# Patient Record
Sex: FEMALE | Race: WHITE | NOT HISPANIC OR LATINO | Employment: OTHER | URBAN - METROPOLITAN AREA
[De-identification: names, ages, dates, MRNs, and addresses within clinical notes are randomized per-mention and may not be internally consistent; named-entity substitution may affect disease eponyms.]

---

## 2017-01-04 ENCOUNTER — OFFICE VISIT (OUTPATIENT)
Dept: FAMILY MEDICINE | Facility: CLINIC | Age: 82
End: 2017-01-04
Payer: MEDICARE

## 2017-01-04 VITALS
BODY MASS INDEX: 30.83 KG/M2 | HEIGHT: 62 IN | WEIGHT: 167.56 LBS | SYSTOLIC BLOOD PRESSURE: 147 MMHG | DIASTOLIC BLOOD PRESSURE: 67 MMHG | HEART RATE: 59 BPM | TEMPERATURE: 98 F

## 2017-01-04 DIAGNOSIS — F41.9 ANXIETY: ICD-10-CM

## 2017-01-04 DIAGNOSIS — I10 BENIGN ESSENTIAL HTN: ICD-10-CM

## 2017-01-04 DIAGNOSIS — M06.9 RHEUMATOID ARTHRITIS, INVOLVING UNSPECIFIED SITE, UNSPECIFIED RHEUMATOID FACTOR PRESENCE: Primary | ICD-10-CM

## 2017-01-04 DIAGNOSIS — M35.3 PMR (POLYMYALGIA RHEUMATICA): ICD-10-CM

## 2017-01-04 PROCEDURE — 99214 OFFICE O/P EST MOD 30 MIN: CPT | Mod: S$PBB,,, | Performed by: FAMILY MEDICINE

## 2017-01-04 PROCEDURE — 99999 PR PBB SHADOW E&M-EST. PATIENT-LVL III: CPT | Mod: PBBFAC,,, | Performed by: FAMILY MEDICINE

## 2017-01-04 PROCEDURE — 99213 OFFICE O/P EST LOW 20 MIN: CPT | Mod: PBBFAC,PO | Performed by: FAMILY MEDICINE

## 2017-01-04 RX ORDER — PREDNISONE 10 MG/1
30 TABLET ORAL DAILY
Refills: 1 | COMMUNITY
Start: 2016-11-09 | End: 2017-03-16

## 2017-01-04 NOTE — PROGRESS NOTES
The patient presents today to follow up flare PMR last esr was upper 20 and she is currently on 10mg pred   Hx nocturnal leg pain currently tx gabapentin. HBP has been stable.  Has chronic anxiety uses ativan late afternoon and hs. Hx esophageal strictures 2nd to GERD. Currently on omeprazole.     Past Medical History:  Past Medical History   Diagnosis Date    Hypertension     Polymyalgia rheumatica     Rheumatoid arthritis      Past Surgical History   Procedure Laterality Date    Hysterectomy      Cholecystectomy      Carpal tunnel release Bilateral     Eye surgery       Review of patient's allergies indicates:   Allergen Reactions    Xanax [alprazolam] Swelling    Shellfish containing products Swelling    Sulfa (sulfonamide antibiotics) Nausea And Vomiting     Current Outpatient Prescriptions on File Prior to Visit   Medication Sig Dispense Refill    amlodipine (NORVASC) 5 MG tablet Take 5 mg by mouth once daily.      CALCIUM CARBONATE/VITAMIN D3 (CALCIUM 600 + D,3, ORAL) Take by mouth.      cloNIDine (CATAPRES) 0.2 MG tablet Take 0.2 mg by mouth once daily.  3    folic acid (FOLVITE) 1 MG tablet Take 1,000 mcg by mouth once daily.  6    FOLIC ACID/MULTIVIT-MIN/LUTEIN (CENTRUM SILVER ORAL) Take by mouth.      gabapentin (NEURONTIN) 300 MG capsule One in the am and 2 in the pm 270 capsule 4    irbesartan-hydrochlorothiazide (AVALIDE) 300-12.5 mg per tablet Take 1 tablet by mouth once daily. 90 tablet 3    loperamide (IMODIUM A-D) 2 mg Tab Take 2 mg by mouth 4 (four) times daily as needed.      loratadine (CLARITIN) 10 mg tablet Take 10 mg by mouth once daily.  3    lorazepam (ATIVAN) 1 MG tablet TAKE 1 TABLET BY MOUTH 2 (TWO) TIMES DAILY. 60 tablet 5    methotrexate 2.5 MG Tab Take 8 tablets (20 mg total) by mouth every 7 days. 40 tablet 2    metoprolol succinate (TOPROL-XL) 50 MG 24 hr tablet Take 50 mg by mouth once daily.      omeprazole (PRILOSEC) 20 MG capsule Take 20 mg by mouth 2 (two)  times daily.  3    tramadol (ULTRAM) 50 mg tablet Take 2 tablets (100 mg total) by mouth 4 (four) times daily. 120 tablet 5    [DISCONTINUED] predniSONE (DELTASONE) 1 MG tablet TAKE 3 TABLETS BY MOUTH ONCE DAILY 90 tablet 1    [DISCONTINUED] aspirin (ECOTRIN) 81 MG EC tablet Take 81 mg by mouth once daily.      [DISCONTINUED] hydrocodone-acetaminophen 5-325mg (NORCO) 5-325 mg per tablet Take 1 tablet by mouth 3 (three) times daily as needed for Pain. 30 tablet 0    [DISCONTINUED] predniSONE (DELTASONE) 20 MG tablet Take 40 mg by mouth 2 (two) times daily. Take 40 mg for 5 days and then go back to 3 mg prednisone.       No current facility-administered medications on file prior to visit.      Social History     Social History    Marital status:      Spouse name: N/A    Number of children: N/A    Years of education: N/A     Occupational History    Not on file.     Social History Main Topics    Smoking status: Never Smoker    Smokeless tobacco: Not on file    Alcohol use Not on file    Drug use: Not on file    Sexual activity: Not on file     Other Topics Concern    Not on file     Social History Narrative     History reviewed. No pertinent family history.      ROS:GENERAL: No fever, chills, fatigability or weight loss.  SKIN: No rashes, itching or changes in color or texture of skin.  HEAD: No headaches or recent head trauma.EYES: Visual acuity fine. No photophobia, ocular pain or diplopia.EARS: Denies ear pain, discharge or vertigo.NOSE: No loss of smell, no epistaxis or postnasal drip.MOUTH & THROAT: No hoarseness or change in voice. No excessive gum bleeding.NODES: Denies swollen glands.  CHEST: Denies DURAN, cyanosis, wheezing, cough and sputum production.  CARDIOVASCULAR: Denies chest pain, PND, orthopnea or reduced exercise tolerance.  ABDOMEN: Appetite fine. No weight loss. Denies diarrhea, abdominal pain, hematemesis or blood in stool.  URINARY: No flank pain, dysuria or  hematuria.  PERIPHERAL VASCULAR: No claudication or cyanosis.  MUSCULOSKELETAL: See above.  NEUROLOGIC: No history of seizures, paralysis, alteration of gait or coordination.  PE:   HEAD: Normocephalic, atraumatic.EYES: PERRL. EOMI.   EARS: TM's intact. Light reflex normal. No retraction or perforation.   NOSE: Mucosa pink. Airway clear.MOUTH & THROAT: No tonsillar enlargement. No pharyngeal erythema or exudate. No stridor.  NODES: No cervical, axillary or inguinal lymph node enlargement.  CHEST: Lungs clear to auscultation.  CARDIOVASCULAR: Normal S1, S2. No rubs, murmurs or gallops.  ABDOMEN: Bowel sounds normal. Not distended. Soft. No tenderness or masses.  MUSCULOSKELETAL: Hand deformities and gen arthralgia. Rt knee tender swollen    NEUROLOGIC: Cranial Nerves: II-XII grossly intact.  Motor: 5/5 strength major flexors/extensors.  DTR's: Knees, Ankles 2+ and equal bilaterally; downgoing toes.  Sensory: Intact to light touch distally.  Gait & Posture: Normal gait and fine motion. No cerebellar signs.     Impression:RA  PMR  Trigger point  HBP  Anxiety    Plan:Lab eval rev   Incr pred to 15   [facility] ESR 1m   Rec diet and ex recs  Rev sig side effects current meds but has been well controlled on current regimen for years and will maintain for now

## 2017-01-10 RX ORDER — TRAMADOL HYDROCHLORIDE 50 MG/1
TABLET ORAL
Qty: 120 TABLET | Refills: 3 | Status: SHIPPED | OUTPATIENT
Start: 2017-01-10 | End: 2017-03-13 | Stop reason: SDUPTHER

## 2017-01-13 RX ORDER — PREDNISONE 5 MG/1
5 TABLET ORAL DAILY
Qty: 90 TABLET | Refills: 0 | Status: SHIPPED | OUTPATIENT
Start: 2017-01-13 | End: 2017-05-25 | Stop reason: SDUPTHER

## 2017-01-13 RX ORDER — PREDNISONE 5 MG/1
5 TABLET ORAL DAILY
COMMUNITY
End: 2017-01-13 | Stop reason: SDUPTHER

## 2017-01-20 DIAGNOSIS — M06.9 RHEUMATOID ARTHRITIS INVOLVING MULTIPLE JOINTS: Primary | ICD-10-CM

## 2017-01-20 RX ORDER — METHOTREXATE 2.5 MG/1
20 TABLET ORAL
Qty: 40 TABLET | Refills: 2 | Status: CANCELLED | OUTPATIENT
Start: 2017-01-20

## 2017-01-31 RX ORDER — METHOTREXATE 2.5 MG/1
TABLET ORAL
Qty: 40 TABLET | Refills: 2 | Status: SHIPPED | OUTPATIENT
Start: 2017-01-31 | End: 2017-04-20 | Stop reason: SDUPTHER

## 2017-02-03 ENCOUNTER — LAB VISIT (OUTPATIENT)
Dept: LAB | Facility: HOSPITAL | Age: 82
End: 2017-02-03
Attending: FAMILY MEDICINE
Payer: MEDICARE

## 2017-02-03 DIAGNOSIS — M35.3 PMR (POLYMYALGIA RHEUMATICA): ICD-10-CM

## 2017-02-03 LAB — ERYTHROCYTE [SEDIMENTATION RATE] IN BLOOD BY WESTERGREN METHOD: 6 MM/HR

## 2017-02-03 PROCEDURE — 36415 COLL VENOUS BLD VENIPUNCTURE: CPT | Mod: PO

## 2017-02-03 PROCEDURE — 85651 RBC SED RATE NONAUTOMATED: CPT | Mod: PO

## 2017-02-10 RX ORDER — FOLIC ACID 1 MG/1
TABLET ORAL
Qty: 30 TABLET | Refills: 6 | Status: SHIPPED | OUTPATIENT
Start: 2017-02-10 | End: 2017-09-12 | Stop reason: SDUPTHER

## 2017-02-10 RX ORDER — PREDNISONE 10 MG/1
TABLET ORAL
Qty: 90 TABLET | Refills: 1 | Status: SHIPPED | OUTPATIENT
Start: 2017-02-10 | End: 2017-06-21

## 2017-03-02 ENCOUNTER — PATIENT MESSAGE (OUTPATIENT)
Dept: FAMILY MEDICINE | Facility: CLINIC | Age: 82
End: 2017-03-02

## 2017-03-02 DIAGNOSIS — M19.90 ARTHRITIS: Primary | ICD-10-CM

## 2017-03-07 ENCOUNTER — TELEPHONE (OUTPATIENT)
Dept: FAMILY MEDICINE | Facility: CLINIC | Age: 82
End: 2017-03-07

## 2017-03-07 NOTE — TELEPHONE ENCOUNTER
----- Message from Arpita Butcher sent at 3/6/2017  4:10 PM CST -----  Contact: Maria Antonia Perry/daughter  States she needs to speak to the nurse too see if Dr Hoover will do cortisone shots in her knee. Please call Maria Antonia Perry at [phone]. Thank you

## 2017-03-13 RX ORDER — TRAMADOL HYDROCHLORIDE 50 MG/1
TABLET ORAL
Qty: 240 TABLET | Refills: 3 | Status: SHIPPED | OUTPATIENT
Start: 2017-03-13 | End: 2017-07-11 | Stop reason: SDUPTHER

## 2017-03-13 NOTE — TELEPHONE ENCOUNTER
Checked with pharmacy. This is a 15 day supply. Adjusted dispense tablets to reflect for 30 day supply

## 2017-03-16 ENCOUNTER — LAB VISIT (OUTPATIENT)
Dept: LAB | Facility: HOSPITAL | Age: 82
End: 2017-03-16
Attending: FAMILY MEDICINE
Payer: MEDICARE

## 2017-03-16 ENCOUNTER — OFFICE VISIT (OUTPATIENT)
Dept: FAMILY MEDICINE | Facility: CLINIC | Age: 82
End: 2017-03-16
Payer: MEDICARE

## 2017-03-16 VITALS
WEIGHT: 167.56 LBS | HEART RATE: 59 BPM | HEIGHT: 62 IN | TEMPERATURE: 98 F | BODY MASS INDEX: 30.83 KG/M2 | DIASTOLIC BLOOD PRESSURE: 60 MMHG | SYSTOLIC BLOOD PRESSURE: 122 MMHG

## 2017-03-16 DIAGNOSIS — M06.9 RHEUMATOID ARTHRITIS, INVOLVING UNSPECIFIED SITE, UNSPECIFIED RHEUMATOID FACTOR PRESENCE: ICD-10-CM

## 2017-03-16 DIAGNOSIS — Z96.651 HISTORY OF TOTAL KNEE ARTHROPLASTY, RIGHT: ICD-10-CM

## 2017-03-16 DIAGNOSIS — M06.9 RHEUMATOID ARTHRITIS, INVOLVING UNSPECIFIED SITE, UNSPECIFIED RHEUMATOID FACTOR PRESENCE: Primary | ICD-10-CM

## 2017-03-16 DIAGNOSIS — I10 BENIGN ESSENTIAL HTN: ICD-10-CM

## 2017-03-16 LAB
ALBUMIN SERPL BCP-MCNC: 3.6 G/DL
ALP SERPL-CCNC: 65 U/L
ALT SERPL W/O P-5'-P-CCNC: 22 U/L
ANION GAP SERPL CALC-SCNC: 10 MMOL/L
AST SERPL-CCNC: 22 U/L
BILIRUB SERPL-MCNC: 0.6 MG/DL
BUN SERPL-MCNC: 17 MG/DL
CALCIUM SERPL-MCNC: 9.3 MG/DL
CHLORIDE SERPL-SCNC: 101 MMOL/L
CO2 SERPL-SCNC: 31 MMOL/L
CREAT SERPL-MCNC: 0.9 MG/DL
EST. GFR  (AFRICAN AMERICAN): >60 ML/MIN/1.73 M^2
EST. GFR  (NON AFRICAN AMERICAN): 57.3 ML/MIN/1.73 M^2
GLUCOSE SERPL-MCNC: 112 MG/DL
POTASSIUM SERPL-SCNC: 3.5 MMOL/L
PROT SERPL-MCNC: 6.6 G/DL
SODIUM SERPL-SCNC: 142 MMOL/L

## 2017-03-16 PROCEDURE — 99999 PR PBB SHADOW E&M-EST. PATIENT-LVL III: CPT | Mod: PBBFAC,,, | Performed by: FAMILY MEDICINE

## 2017-03-16 PROCEDURE — 99214 OFFICE O/P EST MOD 30 MIN: CPT | Mod: 25,S$PBB,, | Performed by: FAMILY MEDICINE

## 2017-03-16 PROCEDURE — 80053 COMPREHEN METABOLIC PANEL: CPT

## 2017-03-16 PROCEDURE — 36415 COLL VENOUS BLD VENIPUNCTURE: CPT | Mod: PO

## 2017-03-16 PROCEDURE — 20610 DRAIN/INJ JOINT/BURSA W/O US: CPT | Mod: S$PBB,,, | Performed by: FAMILY MEDICINE

## 2017-03-16 RX ORDER — NAPROXEN SODIUM 220 MG/1
81 TABLET, FILM COATED ORAL DAILY
COMMUNITY
End: 2019-01-01

## 2017-03-16 NOTE — PROGRESS NOTES
The patient presents today to follow up flare PMR last esr was nl tried to decr to10mg pred had to return to 15-discussed risk.  Also co sever pain and swelling rt knee   Hx nocturnal leg pain currently tx gabapentin. HBP has been stable.  Has chronic anxiety uses ativan late afternoon and hs. Hx esophageal strictures 2nd to GERD. Currently on omeprazole.     Past Medical History:  Past Medical History:   Diagnosis Date    Hypertension     Polymyalgia rheumatica     Rheumatoid arthritis      Past Surgical History:   Procedure Laterality Date    CARPAL TUNNEL RELEASE Bilateral     CHOLECYSTECTOMY      EYE SURGERY      HYSTERECTOMY       Review of patient's allergies indicates:   Allergen Reactions    Xanax [alprazolam] Swelling    Shellfish containing products Swelling    Sulfa (sulfonamide antibiotics) Nausea And Vomiting     Current Outpatient Prescriptions on File Prior to Visit   Medication Sig Dispense Refill    amlodipine (NORVASC) 5 MG tablet Take 5 mg by mouth once daily.      CALCIUM CARBONATE/VITAMIN D3 (CALCIUM 600 + D,3, ORAL) Take by mouth.      cloNIDine (CATAPRES) 0.2 MG tablet Take 0.2 mg by mouth once daily.  3    folic acid (FOLVITE) 1 MG tablet TAKE 1 TABLET BY MOUTH EVERY DAY 30 tablet 6    FOLIC ACID/MULTIVIT-MIN/LUTEIN (CENTRUM SILVER ORAL) Take by mouth.      gabapentin (NEURONTIN) 300 MG capsule One in the am and 2 in the pm 270 capsule 4    irbesartan-hydrochlorothiazide (AVALIDE) 300-12.5 mg per tablet Take 1 tablet by mouth once daily. 90 tablet 3    loperamide (IMODIUM A-D) 2 mg Tab Take 2 mg by mouth 4 (four) times daily as needed.      loratadine (CLARITIN) 10 mg tablet Take 10 mg by mouth once daily.  3    lorazepam (ATIVAN) 1 MG tablet TAKE 1 TABLET BY MOUTH 2 (TWO) TIMES DAILY. 60 tablet 5    methotrexate 2.5 MG Tab TAKE 8 TABLETS BY MOUTH EVERY 7 DAYS. 40 tablet 2    metoprolol succinate (TOPROL-XL) 50 MG 24 hr tablet Take 50 mg by mouth once daily.       omeprazole (PRILOSEC) 20 MG capsule Take 20 mg by mouth 2 (two) times daily.  3    predniSONE (DELTASONE) 10 MG tablet TAKE 3 TABLETS BY MOUTH ONCE DAILY 90 tablet 1    predniSONE (DELTASONE) 5 MG tablet Take 1 tablet (5 mg total) by mouth once daily. 90 tablet 0    tramadol (ULTRAM) 50 mg tablet TAKE 2 TABLETS BY MOUTH FOUR TIMES DAILY 240 tablet 3    [DISCONTINUED] predniSONE (DELTASONE) 10 MG tablet Take 30 mg by mouth once daily.  1     No current facility-administered medications on file prior to visit.      Social History     Social History    Marital status:      Spouse name: N/A    Number of children: N/A    Years of education: N/A     Occupational History    Not on file.     Social History Main Topics    Smoking status: Never Smoker    Smokeless tobacco: Not on file    Alcohol use Not on file    Drug use: Not on file    Sexual activity: Not on file     Other Topics Concern    Not on file     Social History Narrative     History reviewed. No pertinent family history.      ROS:GENERAL: No fever, chills, fatigability or weight loss.  SKIN: No rashes, itching or changes in color or texture of skin.  HEAD: No headaches or recent head trauma.EYES: Visual acuity fine. No photophobia, ocular pain or diplopia.EARS: Denies ear pain, discharge or vertigo.NOSE: No loss of smell, no epistaxis or postnasal drip.MOUTH & THROAT: No hoarseness or change in voice. No excessive gum bleeding.NODES: Denies swollen glands.  CHEST: Denies DURAN, cyanosis, wheezing, cough and sputum production.  CARDIOVASCULAR: Denies chest pain, PND, orthopnea or reduced exercise tolerance.  ABDOMEN: Appetite fine. No weight loss. Denies diarrhea, abdominal pain, hematemesis or blood in stool.  URINARY: No flank pain, dysuria or hematuria.  PERIPHERAL VASCULAR: No claudication or cyanosis.  MUSCULOSKELETAL: See above.  NEUROLOGIC: No history of seizures, paralysis, alteration of gait or coordination.  PE:   HEAD: Normocephalic,  atraumatic.EYES: PERRL. EOMI.   EARS: TM's intact. Light reflex normal. No retraction or perforation.   NOSE: Mucosa pink. Airway clear.MOUTH & THROAT: No tonsillar enlargement. No pharyngeal erythema or exudate. No stridor.  NODES: No cervical, axillary or inguinal lymph node enlargement.  CHEST: Lungs clear to auscultation.  CARDIOVASCULAR: Normal S1, S2. No rubs, murmurs or gallops.  ABDOMEN: Bowel sounds normal. Not distended. Soft. No tenderness or masses.  MUSCULOSKELETAL: Hand deformities and gen arthralgia. Rt knee tender swollen    NEUROLOGIC: Cranial Nerves: II-XII grossly intact.  Motor: 5/5 strength major flexors/extensors.  DTR's: Knees, Ankles 2+ and equal bilaterally; downgoing toes.  Sensory: Intact to light touch distally.  Gait & Posture: Normal gait and fine motion. No cerebellar signs.     Impression:RA  Acute rt knee arthralgia   PMR  Trigger point  HBP  Anxiety    Plan:Lab eval rev   OK  pred to 15   [facility] CMP  Rec diet and ex recs  Rev sig side effects current meds but has been well controlled on current regimen for years and will maintain for now    Procedure   After rev risks benefits and verbal consent rt medial knee prepped in a sterile fashion and injected w 40mg medrol 4 mg dexamethasone ,no bleeding pt tolerated well

## 2017-03-17 ENCOUNTER — TELEPHONE (OUTPATIENT)
Dept: FAMILY MEDICINE | Facility: CLINIC | Age: 82
End: 2017-03-17

## 2017-03-17 DIAGNOSIS — M06.9 RHEUMATOID ARTHRITIS INVOLVING MULTIPLE SITES, UNSPECIFIED RHEUMATOID FACTOR PRESENCE: Primary | ICD-10-CM

## 2017-04-20 RX ORDER — METHOTREXATE 2.5 MG/1
TABLET ORAL
Qty: 40 TABLET | Refills: 2 | Status: SHIPPED | OUTPATIENT
Start: 2017-04-20 | End: 2017-08-24 | Stop reason: SDUPTHER

## 2017-05-11 RX ORDER — LORAZEPAM 1 MG/1
TABLET ORAL
Qty: 60 TABLET | Refills: 5 | Status: SHIPPED | OUTPATIENT
Start: 2017-05-11 | End: 2017-11-21 | Stop reason: SDUPTHER

## 2017-05-25 RX ORDER — PREDNISONE 5 MG/1
TABLET ORAL
Qty: 90 TABLET | Refills: 4 | Status: SHIPPED | OUTPATIENT
Start: 2017-05-25 | End: 2017-06-21

## 2017-06-06 RX ORDER — METOPROLOL SUCCINATE 50 MG/1
50 TABLET, EXTENDED RELEASE ORAL DAILY
Qty: 30 TABLET | Refills: 12 | Status: SHIPPED | OUTPATIENT
Start: 2017-06-06 | End: 2018-04-18 | Stop reason: SDUPTHER

## 2017-06-06 RX ORDER — AMLODIPINE BESYLATE 5 MG/1
5 TABLET ORAL DAILY
Qty: 30 TABLET | Refills: 12 | Status: SHIPPED | OUTPATIENT
Start: 2017-06-06 | End: 2018-05-12 | Stop reason: SDUPTHER

## 2017-06-06 NOTE — TELEPHONE ENCOUNTER
----- Message from Joslyn Peralta sent at 6/6/2017  4:32 PM CDT -----  Contact: Maria Antonia-daughter   Maria Anotnia-daughter states [facility], called they have already sent a request over for pt high blood pressure medication have not received a response patient need refill as soon as possible, please...[phone]

## 2017-06-21 ENCOUNTER — OFFICE VISIT (OUTPATIENT)
Dept: FAMILY MEDICINE | Facility: CLINIC | Age: 82
End: 2017-06-21
Payer: MEDICARE

## 2017-06-21 VITALS
DIASTOLIC BLOOD PRESSURE: 64 MMHG | HEART RATE: 57 BPM | HEIGHT: 62 IN | WEIGHT: 167.25 LBS | TEMPERATURE: 98 F | SYSTOLIC BLOOD PRESSURE: 134 MMHG | BODY MASS INDEX: 30.78 KG/M2

## 2017-06-21 DIAGNOSIS — M06.9 RHEUMATOID ARTHRITIS, INVOLVING UNSPECIFIED SITE, UNSPECIFIED RHEUMATOID FACTOR PRESENCE: ICD-10-CM

## 2017-06-21 DIAGNOSIS — J06.9 UPPER RESPIRATORY TRACT INFECTION, UNSPECIFIED TYPE: Primary | ICD-10-CM

## 2017-06-21 DIAGNOSIS — I10 BENIGN ESSENTIAL HTN: ICD-10-CM

## 2017-06-21 PROCEDURE — 99999 PR PBB SHADOW E&M-EST. PATIENT-LVL II: CPT | Mod: PBBFAC,,, | Performed by: FAMILY MEDICINE

## 2017-06-21 PROCEDURE — 99214 OFFICE O/P EST MOD 30 MIN: CPT | Mod: S$PBB,,, | Performed by: FAMILY MEDICINE

## 2017-06-21 PROCEDURE — 1159F MED LIST DOCD IN RCRD: CPT | Mod: ,,, | Performed by: FAMILY MEDICINE

## 2017-06-21 PROCEDURE — 99212 OFFICE O/P EST SF 10 MIN: CPT | Mod: PBBFAC,PO | Performed by: FAMILY MEDICINE

## 2017-06-21 RX ORDER — MONTELUKAST SODIUM 10 MG/1
10 TABLET ORAL NIGHTLY
Qty: 30 TABLET | Refills: 0 | Status: SHIPPED | OUTPATIENT
Start: 2017-06-21 | End: 2017-08-19 | Stop reason: SDUPTHER

## 2017-06-21 NOTE — PROGRESS NOTES
The patient presents today co ur roseanna and cough Also  to follow up flare PMR pain decr rom rt shoulder   HBP has been stable.  Has chronic anxiety uses ativan late afternoon and hs. Hx esophageal strictures 2nd to GERD. Currently on omeprazole.     Past Medical History:  Past Medical History:   Diagnosis Date    Hypertension     Polymyalgia rheumatica     Rheumatoid arthritis      Past Surgical History:   Procedure Laterality Date    CARPAL TUNNEL RELEASE Bilateral     CHOLECYSTECTOMY      EYE SURGERY      HYSTERECTOMY       Review of patient's allergies indicates:   Allergen Reactions    Xanax [alprazolam] Swelling    Shellfish containing products Swelling    Sulfa (sulfonamide antibiotics) Nausea And Vomiting     Current Outpatient Prescriptions on File Prior to Visit   Medication Sig Dispense Refill    amlodipine (NORVASC) 5 MG tablet Take 1 tablet (5 mg total) by mouth once daily. 30 tablet 12    aspirin 81 MG Chew Take 81 mg by mouth once daily.      CALCIUM CARBONATE/VITAMIN D3 (CALCIUM 600 + D,3, ORAL) Take by mouth.      cloNIDine (CATAPRES) 0.2 MG tablet Take 0.2 mg by mouth once daily.  3    folic acid (FOLVITE) 1 MG tablet TAKE 1 TABLET BY MOUTH EVERY DAY 30 tablet 6    FOLIC ACID/MULTIVIT-MIN/LUTEIN (CENTRUM SILVER ORAL) Take by mouth.      gabapentin (NEURONTIN) 300 MG capsule One in the am and 2 in the pm 270 capsule 4    irbesartan-hydrochlorothiazide (AVALIDE) 300-12.5 mg per tablet Take 1 tablet by mouth once daily. 90 tablet 3    loperamide (IMODIUM A-D) 2 mg Tab Take 2 mg by mouth 4 (four) times daily as needed.      loratadine (CLARITIN) 10 mg tablet Take 10 mg by mouth once daily.  3    lorazepam (ATIVAN) 1 MG tablet TAKE 1 TABLET BY MOUTH TWICE A DAY 60 tablet 5    methotrexate 2.5 MG Tab TAKE 8 TABLETS BY MOUTH EVERY 7 DAYS. 40 tablet 2    metoprolol succinate (TOPROL-XL) 50 MG 24 hr tablet Take 1 tablet (50 mg total) by mouth once daily. 30 tablet 12    omeprazole  (PRILOSEC) 20 MG capsule Take 20 mg by mouth 2 (two) times daily.  3    tramadol (ULTRAM) 50 mg tablet TAKE 2 TABLETS BY MOUTH FOUR TIMES DAILY 240 tablet 3    [DISCONTINUED] predniSONE (DELTASONE) 10 MG tablet TAKE 3 TABLETS BY MOUTH ONCE DAILY 90 tablet 1    [DISCONTINUED] predniSONE (DELTASONE) 5 MG tablet TAKE 1 TABLET BY MOUTH EVERY DAY 90 tablet 4     No current facility-administered medications on file prior to visit.      Social History     Social History    Marital status:      Spouse name: N/A    Number of children: N/A    Years of education: N/A     Occupational History    Not on file.     Social History Main Topics    Smoking status: Never Smoker    Smokeless tobacco: Not on file    Alcohol use Not on file    Drug use: Unknown    Sexual activity: Not on file     Other Topics Concern    Not on file     Social History Narrative    No narrative on file     History reviewed. No pertinent family history.      ROS:GENERAL: No fever, chills, fatigability or weight loss.  SKIN: No rashes, itching or changes in color or texture of skin.  HEAD: No headaches or recent head trauma.EYES: Visual acuity fine. No photophobia, ocular pain or diplopia.EARS: Denies ear pain, discharge or vertigo.NOSE: No loss of smell, no epistaxis or postnasal drip.MOUTH & THROAT: No hoarseness or change in voice. No excessive gum bleeding.NODES: Denies swollen glands.  CHEST: Denies DURAN, cyanosis, wheezing, cough and sputum production.  CARDIOVASCULAR: Denies chest pain, PND, orthopnea or reduced exercise tolerance.  ABDOMEN: Appetite fine. No weight loss. Denies diarrhea, abdominal pain, hematemesis or blood in stool.  URINARY: No flank pain, dysuria or hematuria.  PERIPHERAL VASCULAR: No claudication or cyanosis.  MUSCULOSKELETAL: See above.  NEUROLOGIC: No history of seizures, paralysis, alteration of gait or coordination.  PE:   HEAD: Normocephalic, atraumatic.EYES: PERRL. EOMI.   EARS: TM's intact. Light reflex  normal. No retraction or perforation.   NOSE: Mucosa pink. Airway clear.MOUTH & THROAT: No tonsillar enlargement. No pharyngeal erythema or exudate. No stridor.  NODES: No cervical, axillary or inguinal lymph node enlargement.  CHEST: Lungs clear to auscultation.  CARDIOVASCULAR: Normal S1, S2. No rubs, murmurs or gallops.  ABDOMEN: Bowel sounds normal. Not distended. Soft. No tenderness or masses.  MUSCULOSKELETAL: Hand deformities and gen arthralgia. Rt post [place]oulder tender rom limited tender swollen    NEUROLOGIC: Cranial Nerves: II-XII grossly intact.  Motor: 5/5 strength major flexors/extensors.  DTR's: Knees, Ankles 2+ and equal bilaterally; downgoing toes.  Sensory: Intact to light touch distally.  Gait & Posture: Normal gait and fine motion. No cerebellar signs.     Impression:URI  RA  Acute rt knee arthralgia   PMR  HBP  Anxiety    Plan:Lab eval rev   OK  pred to 15   [facility] CMP  Rec diet and ex recs  Rev sig side effects current meds but has been well controlled on current regimen for years and will maintain for now    Procedure   After rev risks benefits and verbal consent rt posterior shoulder prepped in a sterile fashion and injected w 40mg medrol 4 mg dexamethasone ,no bleeding pt tolerated well

## 2017-06-23 ENCOUNTER — TELEPHONE (OUTPATIENT)
Dept: FAMILY MEDICINE | Facility: CLINIC | Age: 82
End: 2017-06-23

## 2017-06-23 RX ORDER — CEPHALEXIN 500 MG/1
500 CAPSULE ORAL EVERY 12 HOURS
Qty: 14 CAPSULE | Refills: 0 | Status: SHIPPED | OUTPATIENT
Start: 2017-06-23 | End: 2017-09-08

## 2017-06-23 NOTE — TELEPHONE ENCOUNTER
----- Message from Kala Jean-Baptiste sent at 6/23/2017  9:12 AM CDT -----  Contact: Patients daughter, Maria Antonia  Ms Maria Antonia states that her mothers congestion has gotten worse, coughing a lot but not bringing anything up. Ms Em would like to be advised, please call her back at [phone]. Thank you

## 2017-07-03 RX ORDER — OMEPRAZOLE 20 MG/1
CAPSULE, DELAYED RELEASE ORAL
Qty: 180 CAPSULE | Refills: 3 | Status: SHIPPED | OUTPATIENT
Start: 2017-07-03 | End: 2018-04-06 | Stop reason: SDUPTHER

## 2017-07-11 RX ORDER — LORATADINE 10 MG/1
TABLET ORAL
Qty: 90 TABLET | Refills: 3 | Status: SHIPPED | OUTPATIENT
Start: 2017-07-11 | End: 2018-07-04 | Stop reason: SDUPTHER

## 2017-07-11 RX ORDER — TRAMADOL HYDROCHLORIDE 50 MG/1
TABLET ORAL
Qty: 240 TABLET | Refills: 1 | Status: SHIPPED | OUTPATIENT
Start: 2017-07-11 | End: 2017-09-07 | Stop reason: SDUPTHER

## 2017-07-21 RX ORDER — CLONIDINE HYDROCHLORIDE 0.2 MG/1
0.2 TABLET ORAL DAILY
Qty: 90 TABLET | Refills: 0 | Status: SHIPPED | OUTPATIENT
Start: 2017-07-21 | End: 2017-10-18 | Stop reason: SDUPTHER

## 2017-07-21 NOTE — TELEPHONE ENCOUNTER
----- Message from Toña Paz sent at 7/21/2017  9:37 AM CDT -----  Contact: Maria Antonia Perry/daughter  Maria Antonia called and stated that the pharmacy requested a refill earlier this week for clonidine 0.2 mg and no one has contacted them as of yet, the pharmacy gave her some for today and tomorrow until they hear back from the doctor's office.    [id number] [place] [facility] [address] [place] [address]  [address] [place] [address]  [place] [address]  Phone: [phone] Fax: [phone]    She can be contacted at [phone] cell or [phone] home.    Thanks,  Toña

## 2017-08-02 RX ORDER — PREDNISONE 10 MG/1
TABLET ORAL
Qty: 90 TABLET | Refills: 1 | Status: SHIPPED | OUTPATIENT
Start: 2017-08-02 | End: 2017-10-24

## 2017-08-20 RX ORDER — MONTELUKAST SODIUM 10 MG/1
TABLET ORAL
Qty: 30 TABLET | Refills: 12 | Status: SHIPPED | OUTPATIENT
Start: 2017-08-20 | End: 2017-09-08

## 2017-08-24 RX ORDER — METHOTREXATE 2.5 MG/1
TABLET ORAL
Qty: 40 TABLET | Refills: 12 | Status: SHIPPED | OUTPATIENT
Start: 2017-08-24 | End: 2018-08-17 | Stop reason: SDUPTHER

## 2017-08-30 ENCOUNTER — TELEPHONE (OUTPATIENT)
Dept: FAMILY MEDICINE | Facility: CLINIC | Age: 82
End: 2017-08-30

## 2017-08-30 NOTE — TELEPHONE ENCOUNTER
----- Message from Puja Lauren sent at 8/30/2017  3:08 PM CDT -----  Contact: Jaclyn - daughter  The pt wants to be worked in before 9/14 for leg pain, pt can be reached at [phone]///[id number]

## 2017-08-31 NOTE — TELEPHONE ENCOUNTER
Scheduled appt with patient. She was unable to make this week since her son-in- law had surgery. She declined anyone else.

## 2017-09-07 RX ORDER — TRAMADOL HYDROCHLORIDE 50 MG/1
TABLET ORAL
Qty: 240 TABLET | Refills: 3 | Status: SHIPPED | OUTPATIENT
Start: 2017-09-07 | End: 2018-01-22 | Stop reason: SDUPTHER

## 2017-09-08 ENCOUNTER — OFFICE VISIT (OUTPATIENT)
Dept: FAMILY MEDICINE | Facility: CLINIC | Age: 82
End: 2017-09-08
Payer: MEDICARE

## 2017-09-08 ENCOUNTER — LAB VISIT (OUTPATIENT)
Dept: LAB | Facility: HOSPITAL | Age: 82
End: 2017-09-08
Attending: FAMILY MEDICINE
Payer: MEDICARE

## 2017-09-08 VITALS — BODY MASS INDEX: 31.47 KG/M2 | WEIGHT: 171 LBS | HEART RATE: 61 BPM | HEIGHT: 62 IN

## 2017-09-08 DIAGNOSIS — M54.41 ACUTE BILATERAL LOW BACK PAIN WITH RIGHT-SIDED SCIATICA: Primary | ICD-10-CM

## 2017-09-08 DIAGNOSIS — K21.00 GASTROESOPHAGEAL REFLUX DISEASE WITH ESOPHAGITIS: ICD-10-CM

## 2017-09-08 DIAGNOSIS — I10 BENIGN ESSENTIAL HTN: ICD-10-CM

## 2017-09-08 DIAGNOSIS — K22.2 ESOPHAGEAL STRICTURE: ICD-10-CM

## 2017-09-08 DIAGNOSIS — M35.3 PMR (POLYMYALGIA RHEUMATICA): ICD-10-CM

## 2017-09-08 DIAGNOSIS — M06.9 RHEUMATOID ARTHRITIS INVOLVING MULTIPLE SITES, UNSPECIFIED RHEUMATOID FACTOR PRESENCE: ICD-10-CM

## 2017-09-08 DIAGNOSIS — M06.9 RHEUMATOID ARTHRITIS, INVOLVING UNSPECIFIED SITE, UNSPECIFIED RHEUMATOID FACTOR PRESENCE: ICD-10-CM

## 2017-09-08 LAB
ALBUMIN SERPL BCP-MCNC: 3.6 G/DL
ALP SERPL-CCNC: 83 U/L
ALT SERPL W/O P-5'-P-CCNC: 21 U/L
ANION GAP SERPL CALC-SCNC: 11 MMOL/L
AST SERPL-CCNC: 24 U/L
BILIRUB SERPL-MCNC: 0.6 MG/DL
BUN SERPL-MCNC: 20 MG/DL
CALCIUM SERPL-MCNC: 9.8 MG/DL
CHLORIDE SERPL-SCNC: 96 MMOL/L
CO2 SERPL-SCNC: 30 MMOL/L
CREAT SERPL-MCNC: 1 MG/DL
ERYTHROCYTE [SEDIMENTATION RATE] IN BLOOD BY WESTERGREN METHOD: 20 MM/HR
EST. GFR  (AFRICAN AMERICAN): 57.7 ML/MIN/1.73 M^2
EST. GFR  (NON AFRICAN AMERICAN): 50.1 ML/MIN/1.73 M^2
GLUCOSE SERPL-MCNC: 154 MG/DL
POTASSIUM SERPL-SCNC: 4.2 MMOL/L
PROT SERPL-MCNC: 7.3 G/DL
SODIUM SERPL-SCNC: 137 MMOL/L

## 2017-09-08 PROCEDURE — 36415 COLL VENOUS BLD VENIPUNCTURE: CPT | Mod: PO

## 2017-09-08 PROCEDURE — 85651 RBC SED RATE NONAUTOMATED: CPT

## 2017-09-08 PROCEDURE — 1159F MED LIST DOCD IN RCRD: CPT | Mod: ,,, | Performed by: FAMILY MEDICINE

## 2017-09-08 PROCEDURE — 99212 OFFICE O/P EST SF 10 MIN: CPT | Mod: PBBFAC,PO | Performed by: FAMILY MEDICINE

## 2017-09-08 PROCEDURE — 80053 COMPREHEN METABOLIC PANEL: CPT

## 2017-09-08 PROCEDURE — 99999 PR PBB SHADOW E&M-EST. PATIENT-LVL II: CPT | Mod: PBBFAC,,, | Performed by: FAMILY MEDICINE

## 2017-09-08 PROCEDURE — 99214 OFFICE O/P EST MOD 30 MIN: CPT | Mod: S$PBB,,, | Performed by: FAMILY MEDICINE

## 2017-09-08 RX ORDER — PREDNISONE 20 MG/1
20 TABLET ORAL 2 TIMES DAILY
Qty: 20 TABLET | Refills: 0 | Status: SHIPPED | OUTPATIENT
Start: 2017-09-08 | End: 2017-10-19 | Stop reason: SDUPTHER

## 2017-09-08 NOTE — PROGRESS NOTES
The patient presents today co worsening LBP radiating both legs to knees. Also  to follow up flare PMR pain decr rom rt shoulder   HBP has been stable.  Has chronic anxiety   Hx esophageal strictures 2nd to GERD--GERD sig worse p 2 w. Currently on omeprazole.     Past Medical History:  Past Medical History:   Diagnosis Date    Hypertension     Polymyalgia rheumatica     Rheumatoid arthritis      Past Surgical History:   Procedure Laterality Date    CARPAL TUNNEL RELEASE Bilateral     CHOLECYSTECTOMY      EYE SURGERY      HYSTERECTOMY       Review of patient's allergies indicates:   Allergen Reactions    Xanax [alprazolam] Swelling    Shellfish containing products Swelling    Sulfa (sulfonamide antibiotics) Nausea And Vomiting     Current Outpatient Prescriptions on File Prior to Visit   Medication Sig Dispense Refill    amlodipine (NORVASC) 5 MG tablet Take 1 tablet (5 mg total) by mouth once daily. 30 tablet 12    aspirin 81 MG Chew Take 81 mg by mouth once daily.      CALCIUM CARBONATE/VITAMIN D3 (CALCIUM 600 + D,3, ORAL) Take by mouth.      cloNIDine (CATAPRES) 0.2 MG tablet Take 1 tablet (0.2 mg total) by mouth once daily. 90 tablet 0    folic acid (FOLVITE) 1 MG tablet TAKE 1 TABLET BY MOUTH EVERY DAY 30 tablet 6    FOLIC ACID/MULTIVIT-MIN/LUTEIN (CENTRUM SILVER ORAL) Take by mouth.      gabapentin (NEURONTIN) 300 MG capsule One in the am and 2 in the pm 270 capsule 4    irbesartan-hydrochlorothiazide (AVALIDE) 300-12.5 mg per tablet Take 1 tablet by mouth once daily. 90 tablet 3    loperamide (IMODIUM A-D) 2 mg Tab Take 2 mg by mouth 4 (four) times daily as needed.      loratadine (CLARITIN) 10 mg tablet TAKE 1 TABLET BY MOUTH EVERY DAY 90 tablet 3    lorazepam (ATIVAN) 1 MG tablet TAKE 1 TABLET BY MOUTH TWICE A DAY 60 tablet 5    methotrexate 2.5 MG Tab TAKE 8 TABLETS BY MOUTH EVERY 7 DAYS. 40 tablet 12    metoprolol succinate (TOPROL-XL) 50 MG 24 hr tablet Take 1 tablet (50 mg total)  by mouth once daily. 30 tablet 12    omeprazole (PRILOSEC) 20 MG capsule TAKE 1 CAPSULE BY MOUTH TWICE DAILY 180 capsule 3    predniSONE (DELTASONE) 10 MG tablet TAKE 3 TABLETS BY MOUTH ONCE DAILY (Patient taking differently: TAKE 15mg BY MOUTH ONCE DAILY) 90 tablet 1    tramadol (ULTRAM) 50 mg tablet TAKE 2 TABLETS BY MOUTH FOUR TIMES DAILY 240 tablet 3    [DISCONTINUED] cephALEXin (KEFLEX) 500 MG capsule Take 1 capsule (500 mg total) by mouth every 12 (twelve) hours. 14 capsule 0    [DISCONTINUED] montelukast (SINGULAIR) 10 mg tablet TAKE 1 TABLET BY MOUTH EVERY EVENING. 30 tablet 12     No current facility-administered medications on file prior to visit.      Social History     Social History    Marital status:      Spouse name: N/A    Number of children: N/A    Years of education: N/A     Occupational History    Not on file.     Social History Main Topics    Smoking status: Never Smoker    Smokeless tobacco: Not on file    Alcohol use Not on file    Drug use: Unknown    Sexual activity: Not on file     Other Topics Concern    Not on file     Social History Narrative    No narrative on file     History reviewed. No pertinent family history.      ROS:GENERAL: No fever, chills, fatigability or weight loss.  SKIN: No rashes, itching or changes in color or texture of skin.  HEAD: No headaches or recent head trauma.EYES: Visual acuity fine. No photophobia, ocular pain or diplopia.EARS: Denies ear pain, discharge or vertigo.NOSE: No loss of smell, no epistaxis or postnasal drip.MOUTH & THROAT: No hoarseness or change in voice. No excessive gum bleeding.NODES: Denies swollen glands.  CHEST: Denies DURAN, cyanosis, wheezing, cough and sputum production.  CARDIOVASCULAR: Denies chest pain, PND, orthopnea or reduced exercise tolerance.  ABDOMEN: Appetite fine. No weight loss. Denies diarrhea, abdominal pain, hematemesis or blood in stool.  URINARY: No flank pain, dysuria or hematuria.  PERIPHERAL  VASCULAR: No claudication or cyanosis.  MUSCULOSKELETAL: See above.  NEUROLOGIC: No history of seizures, paralysis, alteration of gait or coordination.  PE:   HEAD: Normocephalic, atraumatic.EYES: PERRL. EOMI.   EARS: TM's intact. Light reflex normal. No retraction or perforation.   NOSE: Mucosa pink. Airway clear.MOUTH & THROAT: No tonsillar enlargement. No pharyngeal erythema or exudate. No stridor.  NODES: No cervical, axillary or inguinal lymph node enlargement.  CHEST: Lungs clear to auscultation.  CARDIOVASCULAR: Normal S1, S2. No rubs, murmurs or gallops.  ABDOMEN: Bowel sounds normal. Not distended. Soft. No tenderness or masses.  MUSCULOSKELETAL: Hand deformities and gen arthralgia. Rt post [place] tender rom limited tender swollen;tender paralumbar and rt SI     NEUROLOGIC: Cranial Nerves: II-XII grossly intact.  Motor: 5/5 strength major flexors/extensors.  DTR's: Knees, Ankles 2+ and equal bilaterally; downgoing toes.  Sensory: Intact to light touch distally.  Gait & Posture: Normal gait and fine motion. No cerebellar signs.     Impression:Sacroiliitis  LBP  GERD c stricture   Radiculopathy  RA  Rt knee arthralgia   PMR  HBP  Anxiety    Plan:Lab eval rev   Incr pred    Rec diet and ex recs  Rev sig side effects current meds but has been well controlled on current regimen for years and will maintain for now

## 2017-09-12 RX ORDER — FOLIC ACID 1 MG/1
TABLET ORAL
Qty: 30 TABLET | Refills: 12 | Status: SHIPPED | OUTPATIENT
Start: 2017-09-12 | End: 2017-11-21 | Stop reason: SDUPTHER

## 2017-09-14 ENCOUNTER — INITIAL CONSULT (OUTPATIENT)
Dept: GASTROENTEROLOGY | Facility: CLINIC | Age: 82
End: 2017-09-14
Payer: MEDICARE

## 2017-09-14 VITALS
BODY MASS INDEX: 30.44 KG/M2 | DIASTOLIC BLOOD PRESSURE: 80 MMHG | WEIGHT: 165.38 LBS | HEIGHT: 62 IN | SYSTOLIC BLOOD PRESSURE: 138 MMHG

## 2017-09-14 DIAGNOSIS — R19.8 IRREGULAR BOWEL HABITS: ICD-10-CM

## 2017-09-14 DIAGNOSIS — R13.19 ESOPHAGEAL DYSPHAGIA: ICD-10-CM

## 2017-09-14 DIAGNOSIS — R12 HEARTBURN: Primary | ICD-10-CM

## 2017-09-14 DIAGNOSIS — R10.9 ABDOMINAL CRAMPING: ICD-10-CM

## 2017-09-14 DIAGNOSIS — Z87.19 HISTORY OF PANCREATITIS: ICD-10-CM

## 2017-09-14 DIAGNOSIS — Z87.19 HISTORY OF IBS: ICD-10-CM

## 2017-09-14 DIAGNOSIS — R10.816 EPIGASTRIC ABDOMINAL TENDERNESS WITHOUT REBOUND TENDERNESS: ICD-10-CM

## 2017-09-14 PROCEDURE — 99213 OFFICE O/P EST LOW 20 MIN: CPT | Mod: PBBFAC,PO | Performed by: NURSE PRACTITIONER

## 2017-09-14 PROCEDURE — 1126F AMNT PAIN NOTED NONE PRSNT: CPT | Mod: ,,, | Performed by: NURSE PRACTITIONER

## 2017-09-14 PROCEDURE — 99214 OFFICE O/P EST MOD 30 MIN: CPT | Mod: S$PBB,,, | Performed by: NURSE PRACTITIONER

## 2017-09-14 PROCEDURE — 1159F MED LIST DOCD IN RCRD: CPT | Mod: ,,, | Performed by: NURSE PRACTITIONER

## 2017-09-14 PROCEDURE — 99999 PR PBB SHADOW E&M-EST. PATIENT-LVL III: CPT | Mod: PBBFAC,,, | Performed by: NURSE PRACTITIONER

## 2017-09-14 RX ORDER — ACETAMINOPHEN 500 MG
1000 TABLET ORAL 4 TIMES DAILY
COMMUNITY

## 2017-09-14 NOTE — PROGRESS NOTES
Subjective:       Patient ID: Fiorella Connelly is a 89 y.o. female Body mass index is 30.24 kg/m².    Chief Complaint: Dysphagia and Gastroesophageal Reflux    This patient is new to me.  Referring Provider:  Dr. Hoover for GERD and dysphagia    Gastroesophageal Reflux   She complains of abdominal pain (intermittent with diarrhea, cramping pain; relieves with bowel movement, denies currently), belching (occasional), choking (occasional; denies having to seek medical assistance to relieve symptom), dysphagia (CHIEF COMPLAINT: recurred about a month ago, worse when hungry; feels like food gets stuck in lower esophagus; can occur with liquid and pills), early satiety (occasional), globus sensation, heartburn, a hoarse voice (occasional) and water brash. She reports no chest pain, no coughing, no nausea or no sore throat. This is a recurrent problem. The problem occurs frequently. The problem has been gradually worsening (over the past 4-6 weeks). The heartburn wakes her from sleep. The heartburn changes with position. The symptoms are aggravated by lying down. Pertinent negatives include no fatigue, melena or weight loss. Risk factors include obesity. She has tried a PPI (PRILOSEC 20MG BID on for several years) for the symptoms. The treatment provided moderate relief. Past procedures include an EGD.     Review of Systems   Constitutional: Positive for appetite change (varies). Negative for chills, fatigue, fever, unexpected weight change and weight loss.   HENT: Positive for hoarse voice (occasional) and trouble swallowing (see hpi). Negative for sore throat.    Respiratory: Positive for choking (occasional; denies having to seek medical assistance to relieve symptom). Negative for cough and shortness of breath.    Cardiovascular: Negative for chest pain.   Gastrointestinal: Positive for abdominal pain (intermittent with diarrhea, cramping pain; relieves with bowel movement, denies currently), constipation, diarrhea,  dysphagia (CHIEF COMPLAINT: recurred about a month ago, worse when hungry; feels like food gets stuck in lower esophagus; can occur with liquid and pills) and heartburn. Negative for anal bleeding, blood in stool, melena, nausea, rectal pain and vomiting.        Denies change in bowel habits; reports started about 3-4 years ago with bowel movements rotating between diarrhea with bowel urgency occurs every 3 days with 5-10 times a day(imodium prn helps) and constipation (can go 3 days without bowel movements after taking imodium); history of fibromyalgia   Genitourinary: Negative for difficulty urinating, dysuria, flank pain, frequency, pelvic pain and urgency.   Musculoskeletal:        Takes ultram QID   Neurological: Negative for weakness.       Past Medical History:   Diagnosis Date    Acute pancreatitis     told chronic pancreatitis prior to diagnosis of gallstone and cholecystectomy    Hypertension     Polymyalgia rheumatica     Rheumatoid arthritis      Past Surgical History:   Procedure Laterality Date    APPENDECTOMY      CARPAL TUNNEL RELEASE Bilateral     CHOLECYSTECTOMY      COLONOSCOPY  ~2010    normal findings per patient report    EYE SURGERY      HYSTERECTOMY      ovaries remain    UPPER GASTROINTESTINAL ENDOSCOPY  ~2010    with dilation     Family History   Problem Relation Age of Onset    Skin cancer Sister     Lung cancer Brother      smoker    Colon cancer Paternal Grandmother     Crohn's disease Neg Hx     Ulcerative colitis Neg Hx     Stomach cancer Neg Hx     Esophageal cancer Neg Hx      Wt Readings from Last 20 Encounters:   09/14/17 75 kg (165 lb 5.5 oz)   09/08/17 77.6 kg (171 lb)   06/21/17 75.8 kg (167 lb 3.5 oz)   03/16/17 76 kg (167 lb 8.8 oz)   01/04/17 76 kg (167 lb 8.8 oz)   12/16/16 76.2 kg (168 lb)   10/05/16 76.2 kg (168 lb 1.6 oz)   09/19/16 75 kg (165 lb 5.5 oz)   08/29/16 77 kg (169 lb 12.1 oz)     Lab Results   Component Value Date    WBC 11.85 10/05/2016     HGB 12.4 10/05/2016    HCT 39.3 10/05/2016     (H) 10/05/2016     (H) 10/05/2016     CMP  Sodium   Date Value Ref Range Status   09/08/2017 137 136 - 145 mmol/L Final     Potassium   Date Value Ref Range Status   09/08/2017 4.2 3.5 - 5.1 mmol/L Final     Chloride   Date Value Ref Range Status   09/08/2017 96 95 - 110 mmol/L Final     CO2   Date Value Ref Range Status   09/08/2017 30 (H) 23 - 29 mmol/L Final     Glucose   Date Value Ref Range Status   09/08/2017 154 (H) 70 - 110 mg/dL Final     BUN, Bld   Date Value Ref Range Status   09/08/2017 20 8 - 23 mg/dL Final     Creatinine   Date Value Ref Range Status   09/08/2017 1.0 0.5 - 1.4 mg/dL Final     Calcium   Date Value Ref Range Status   09/08/2017 9.8 8.7 - 10.5 mg/dL Final     Total Protein   Date Value Ref Range Status   09/08/2017 7.3 6.0 - 8.4 g/dL Final     Albumin   Date Value Ref Range Status   09/08/2017 3.6 3.5 - 5.2 g/dL Final     Total Bilirubin   Date Value Ref Range Status   09/08/2017 0.6 0.1 - 1.0 mg/dL Final     Comment:     For infants and newborns, interpretation of results should be based  on gestational age, weight and in agreement with clinical  observations.  Premature Infant recommended reference ranges:  Up to 24 hours.............<8.0 mg/dL  Up to 48 hours............<12.0 mg/dL  3-5 days..................<15.0 mg/dL  6-29 days.................<15.0 mg/dL       Alkaline Phosphatase   Date Value Ref Range Status   09/08/2017 83 55 - 135 U/L Final     AST   Date Value Ref Range Status   09/08/2017 24 10 - 40 U/L Final     ALT   Date Value Ref Range Status   09/08/2017 21 10 - 44 U/L Final     Anion Gap   Date Value Ref Range Status   09/08/2017 11 8 - 16 mmol/L Final     eGFR if    Date Value Ref Range Status   09/08/2017 57.7 (A) >60 mL/min/1.73 m^2 Final     eGFR if non    Date Value Ref Range Status   09/08/2017 50.1 (A) >60 mL/min/1.73 m^2 Final     Comment:     Calculation used to obtain  the estimated glomerular filtration  rate (eGFR) is the CKD-EPI equation. Since race is unknown   in our information system, the eGFR values for   -American and Non--American patients are given   for each creatinine result.       Objective:      Physical Exam   Constitutional: She is oriented to person, place, and time. She appears well-developed and well-nourished. No distress.   Patient is in a wheelchair   HENT:   Mouth/Throat: Oropharynx is clear and moist and mucous membranes are normal. No oral lesions. No oropharyngeal exudate.   Eyes: Conjunctivae are normal. Pupils are equal, round, and reactive to light. No scleral icterus.   Neck: Neck supple. No tracheal deviation present.   Cardiovascular: Normal rate.    Pulmonary/Chest: Effort normal and breath sounds normal. No respiratory distress. She has no wheezes.   Abdominal: Soft. Normal appearance and bowel sounds are normal. She exhibits no distension, no abdominal bruit and no mass. There is no hepatosplenomegaly. There is tenderness (mild) in the epigastric area. There is no rigidity, no rebound, no guarding, no tenderness at McBurney's point and negative Chaparro's sign. No hernia.   Lymphadenopathy:     She has no cervical adenopathy.   Neurological: She is alert and oriented to person, place, and time.   Skin: Skin is warm and dry. No rash noted. She is not diaphoretic. No erythema. No pallor.   Non-jaundiced   Psychiatric: She has a normal mood and affect. Her behavior is normal.   Nursing note and vitals reviewed.      Assessment:       1. Heartburn    2. Esophageal dysphagia    3. Irregular bowel habits    4. History of IBS    5. History of pancreatitis    6. Epigastric abdominal tenderness without rebound tenderness    7. Abdominal cramping        Plan:       Heartburn  - schedule EGD, discussed procedure with patient, patient verbalized understanding  - continue prilosec 20 mg bid as directed, take in the morning before breakfast,  discussed about possible long term use of medication (prefer to use lowest effective dose or discontinuing if possible) and discussed the risks & benefits with taking a reflux medication long term, and to take OTC calcium and vitamin d supplements as directed (such as Citracal +D), pt verbalized understanding  -discussed about the different types of medications used to treat reflux and how to use them, antacids can be used PRN for breakthrough heartburn symptoms by reducing stomach acid that is already produced, H2 blockers (zantac) work by limiting the amount acid production, & PPI's work to block acid production and are taken daily, patient verbalized understanding.  -Educated patient on lifestyle modifications to help control/reduce reflux/abdominal pain including: avoid large meals, avoid eating within 2-3 hours of bedtime (avoid late night eating & lying down soon after eating), elevate head of bed if nocturnal symptoms are present, smoking cessation (if current smoker), & weight loss (if overweight).   -Educated to avoid known foods which trigger reflux symptoms & to minimize/avoid high-fat foods, chocolate, caffeine, citrus, alcohol, & tomato products.  -Advised to avoid/limit use of NSAID's, since they can cause GI upset, bleeding, and/or ulcers. If needed, take with food.    Esophageal dysphagia  - schedule EGD, discussed procedure with patient and possible esophageal dilation may be performed during procedure if indicated, patient verbalized understanding  - educated patient to eat smaller more frequent meals and to eat slowly and advised to eat a soft diet.  - possible UGI/esophagram/esophageal manometry if symptoms persist    Irregular bowel habits  -     Adenovirus Antigen EIA, Stool; Future; Expected date: 09/14/2017  -     Fecal fat, qualitative; Future; Expected date: 09/14/2017  -     Giardia / Cryptosporidum, EIA; Future; Expected date: 09/14/2017  -     Occult blood x 1, stool; Future; Expected  date: 09/14/2017  -     pH, stool; Future; Expected date: 09/14/2017  -     Rotavirus antigen, stool; Future; Expected date: 09/14/2017  -     Stool Exam-Ova,Cysts,Parasites; Future; Expected date: 09/14/2017  -     WBC, Stool; Future; Expected date: 09/14/2017  -     Stool culture; Future; Expected date: 09/14/2017  -     Clostridium difficile EIA; Future; Expected date: 09/14/2017  -     IgA; Future; Expected date: 09/14/2017  -     Tissue transglutaminase, IgA; Future; Expected date: 09/14/2017  -     Amylase; Future; Expected date: 09/14/2017  -     Lipase; Future; Expected date: 09/14/2017  - schedule Colonoscopy, discussed procedure with the patient, patient verbalized understanding  - recommended OTC probiotics, such as Align or Culturelle, as directed  - avoid lactose  - informed patient can take imodium as directed if needed, but cautioned to take only as needed since it can cause constipation, patient verbalized understanding    History of IBS  - schedule Colonoscopy, discussed procedure with the patient, patient verbalized understanding  - discussed diagnosis with patient, patient verbalized understanding  - recommended OTC probiotics, such as Align or Culturelle, as directed  - avoid lactose  - drink adequate water intake  -smaller, more frequent meals  -avoid trigger foods    History of pancreatitis & Epigastric abdominal tenderness without rebound tenderness  -     Amylase; Future; Expected date: 09/14/2017  -     Lipase; Future; Expected date: 09/14/2017  - schedule EGD, discussed procedure with patient, patient verbalized understanding  - possible abdominal imaging pending results of testing and if symptoms persist    Abdominal cramping  - schedule Colonoscopy, discussed procedure with the patient, patient verbalized understanding  - possible trial of bentyl pending results of testing and if symptoms persist    Return in about 2 months (around 11/14/2017), or if symptoms worsen or fail to improve.       If no improvement in symptoms or symptoms worsen, call/follow-up at clinic or go to ER.

## 2017-09-14 NOTE — LETTER
September 17, 2017      Aris Hoover MD  [id number] [facility] [address]           [facility]  [address] [facility] [address] [place] [address]  Phone: [phone]          Patient: Fiorella Connelly   MR Number: [id number]   YOB: 1928   Date of Visit: 9/14/2017       Dear Dr. Aris Hoover:    Thank you for referring Fiorella Connelly to me for evaluation. Attached you will find relevant portions of my assessment and plan of care.    If you have questions, please do not hesitate to call me. I look forward to following Fiorella Connelly along with you.    Sincerely,        Enclosure  CC:  No Recipients    If you would like to receive this communication electronically, please contact [email or web address] or [phone] to request more information on [employer] Link access.    For providers and/or their staff who would like to refer a patient to [facility], please contact us through our one-stop-shop provider referral line, [facility], at [phone].    If you feel you have received this communication in error or would no longer like to receive these types of communications, please e-mail [email or web address]

## 2017-09-14 NOTE — PATIENT INSTRUCTIONS
Discharge Instructions: Eating a Soft Diet  You have been prescribed a soft diet (also called gastrointestinal soft diet or bland diet). This reduces the amount of work your digestive tract has to do. It also reduces the chance that your digestive tract will be irritated by the food you eat. A soft diet is prescribed for people with digestive problems. The diet consists of foods that are tender, mildly seasoned, and easy to digest. While on this diet, you should not eat fried or spicy foods, or raw fruits and vegetables. Also avoid alcoholic beverages.  General guidelines  · Eat in a calm, relaxed atmosphere. How you eat may be as important as what you eat. Dont rush while eating. Chew your food slowly and thoroughly, and swallow slowly.  · Eat small frequent meals throughout the day, but dont eat within 2 hours of bedtime.  · Avoid any foods that cause discomfort.  · Dont use NSAIDs (nonsteroidal anti-inflammatory drugs), such as aspirin, and ibuprofen. Also avoid medicine that contain aspirin. NSAIDs can cause ulcers and delay or prevent ulcer healing.  · Use antacids as needed, but keep in mind that magnesium-containing antacids may cause diarrhea.  Foods to eat  · Cream of wheat and cream of rice  · Cooked white rice  · Mashed potatoes, and boiled potatoes without skin  · Plain pasta and noodles  · Plain white crackers (such as no-salt soda crackers)  · White bread  · Applesauce  · Cooked fruits without skins or seeds  · Mild juices, such as apple and grape  · Bananas  · Cooked or mashed vegetables without stems and seeds  ¨ Carrots  ¨ Summer squash (zucchini, yellow squash)  ¨ Winter squash (acorn, butternut, spaghetti squash)  · Cottage cheese  · Mild hard or soft cheeses  · Custard  · Yogurt without seeds or nuts  · Milk (you may need lactose-free milk)  · Ice cream without seeds or nuts  · Smooth peanut butter  · Eggs  · Fish, turkey, chicken, or other meat that is not tough or stringy  · Tofu  Foods to  avoid  · Nuts and seeds  · Snack foods, such as the following:  ¨ Chocolate-containing snacks, candy, pastries, or cakes.  ¨ Potato chips (plain, barbecued, or other flavors)  ¨ Taco chips or nachos  ¨ Corn chips  ¨ Popcorn, popcorn cakes, or rice cakes  ¨ Crackers with nuts, seeds, or spicy seasonings  ¨ French fries  · Fried or greasy foods  · Whole-grain breads, rolls, and crackers  · Breads and rolls with nuts, seeds, or bran  · Bran and granola cereals  · Berries with seeds, such as strawberries, raspberries, and blackberries  · Acidic fruits, such as oranges, grapefruits, micheal, limes, and pineapples  · Raw vegetables  · Mild or hot peppers  · Sauerkraut and pickled vegetables  · Tomatoes or tomato products, such as tomato paste, tomato sauce, and tomato juice  · Barbecue sauce  · Spicy or flavored cheeses, such as jalapeño and black pepper cheese  · Crunchy peanut butter  · Dried cooked beans, such as davis, kidney, or navy beans  · The following meats:  ¨ Fried or greasy meats  ¨ Processed, spicy meats, such as sausage, bello, ham, and lunch meats  ¨ Ribs and other meats with barbecue sauce  ¨ Tough or stringy meats, such as corned beef or beef jerky  Fluids to avoid  · Alcoholic beverages  · Coffee and regular teas  · Sandra and other drinks with caffeine  · Cranberry, orange, pineapple, and grapefruit juice  · Lemonade  · Vegetable juice  · Whole milk, if you are lactose intolerant  Follow-up  Make a follow-up appointment with a dietitian as directed by our staff.  Date Last Reviewed: 6/21/2015  © 8758-9267 [employer]. [address] [place] [address], [place] [address]. All rights reserved. This information is not intended as a substitute for professional medical care. Always follow your healthcare professional's instructions.        Uncertain Causes of Diarrhea (Adult)    Diarrhea is when stools are loose and watery. This can be caused by:  · Viral infections  · Bacterial infections  · Food  poisoning  · Parasites  · Irritable bowel syndrome (IBS)  · Inflammatory bowel diseases such as ulcerative colitis, Crohn's disease, and celiac disease  · Food intolerance, such as to lactose, the sugar found in milk and milk products  · Reaction to medicines like antibiotics, laxatives, cancer drugs, and antacids  Along with diarrhea, you may also have:  · Abdominal pain and cramping  · Nausea and vomiting  · Loss of bowel control  · Fever and chills  · Bloody stools  In some cases, antibiotics may help to treat diarrhea. You may have a stool sample test. This is done to see what is causing your diarrhea, and if antibiotics will help treat it. The results of a stool sample test may take up to 2 days. The healthcare provider may not give you antibiotics until he or she has the stool test results.  Diarrhea can cause dehydration. This is the loss of too much water and other fluids from the body. When this occurs, body fluid must be replaced. This can be done with oral rehydration solutions. Oral rehydration solutions are available at drugstores and grocery stores without a prescription.  Home care  Follow all instructions given by your healthcare provider. Rest at home for the next 24 hours, or until you feel better. Avoid caffeine, tobacco, and alcohol. These can make diarrhea, cramping, and pain worse.  If taking medicines:  · Dont take over-the-counter diarrhea or nausea medicines unless your healthcare provider tells you to.  · You may use acetaminophen or NSAID medicines like ibuprofen or naproxen to reduce pain and fever. Dont use these if you have chronic liver or kidney disease, or ever had a stomach ulcer or gastrointestinal bleeding. Don't use NSAID medicines if you are already taking one for another condition (like arthritis) or are on daily aspirin therapy (such as for heart disease or after a stroke). Talk with your healthcare provider first.  · If antibiotics were prescribed, be sure you take them  until they are finished. Dont stop taking them even when you feel better. Antibiotics must be taken as a full course.  To prevent the spread of illness:  · Remember that washing with soap and water and using alcohol-based  is the best way to prevent the spread of infection.  · Clean the toilet after each use.  · Wash your hands before eating.  · Wash your hands before and after preparing food. Keep in mind that people with diarrhea or vomiting should not prepare food for others.  · Wash your hands after using cutting boards, countertops, and knives that have been in contact with raw foods.  · Wash and then peel fruits and vegetables.  · Keep uncooked meats away from cooked and ready-to-eat foods.  · Use a food thermometer when cooking. Cook poultry to at least 165°F (74°C). Cook ground meat (beef, veal, pork, lamb) to at least 160°F (71°C). Cook fresh beef, veal, lamb, and pork to at least 145°F (63°C).  · Dont eat raw or undercooked eggs (poached or jose side up), poultry, meat, or unpasteurized milk and juices.  Food and drinks  The main goal while treating vomiting or diarrhea is to prevent dehydration. This is done by taking small amounts of liquids often.  · Keep in mind that liquids are more important than food right now.  · Drink only small amounts of liquids at a time.  · Dont force yourself to eat, especially if you are having cramping, vomiting, or diarrhea. Dont eat large amounts at a time, even if you are hungry.  · If you eat, avoid fatty, greasy, spicy, or fried foods.  · Dont eat dairy foods or drink milk if you have diarrhea. These can make diarrhea worse.  During the first 24 hours you can try:  · Oral rehydration solutions. Do not use sports drinks. They have too much sugar and not enough electrolytes.  · Soft drinks without caffeine  · Ginger ale  · Water (plain or flavored)  · Decaf tea or coffee  · Clear broth, consommé, or bouillon  · Gelatin, popsicles, or frozen fruit juice  bars  The second 24 hours, if you are feeling better, you can add:  · Hot cereal, plain toast, bread, rolls, or crackers  · Plain noodles, rice, mashed potatoes, chicken noodle soup, or rice soup  · Unsweetened canned fruit (no pineapple)  · Bananas  As you recover:  · Limit fat intake to less than 15 grams per day. Dont eat margarine, butter, oils, mayonnaise, sauces, gravies, fried foods, peanut butter, meat, poultry, or fish.  · Limit fiber. Dont eat raw or cooked vegetables, fresh fruits except bananas, or bran cereals.  · Limit caffeine and chocolate.  · Limit dairy.  · Dont use spices or seasonings except salt.  · Go back to your normal diet over time, as you feel better and your symptoms improve.  · If the symptoms come back, go back to a simple diet or clear liquids.  Follow-up care  Follow up with your healthcare provider, or as advised. If a stool sample was taken or cultures were done, call the healthcare provider for the results as instructed.  Call 911  Call 911 if you have any of these symptoms:  · Trouble breathing  · Confusion  · Extreme drowsiness or trouble walking  · Loss of consciousness  · Rapid heart rate  · Chest pain  · Stiff neck  · Seizure  When to seek medical advice  Call your healthcare provider right away if any of these occur:  · Abdominal pain that gets worse  · Constant lower right abdominal pain  · Continued vomiting and inability to keep liquids down  · Diarrhea more than 5 times a day  · Blood in vomit or stool  · Dark urine or no urine for 8 hours, dry mouth and tongue, tiredness, weakness, or dizziness  · Drowsiness  · New rash  · You dont get better in 2 to 3 days  · Fever of 100.4°F (38°C) or higher that doesnt get lower with medicine  Date Last Reviewed: 1/3/2016  © 6326-7519 [employer]. [address] [place] [address], [place] [address]. All rights reserved. This information is not intended as a substitute for professional medical care. Always follow your  healthcare professional's instructions.

## 2017-09-21 ENCOUNTER — TELEPHONE (OUTPATIENT)
Dept: GASTROENTEROLOGY | Facility: CLINIC | Age: 82
End: 2017-09-21

## 2017-09-21 ENCOUNTER — TELEPHONE (OUTPATIENT)
Dept: FAMILY MEDICINE | Facility: CLINIC | Age: 82
End: 2017-09-21

## 2017-09-21 NOTE — TELEPHONE ENCOUNTER
----- Message from Diya Villalta sent at 9/21/2017  9:44 AM CDT -----  Contact: Daughter Jaclyn Perry [phone]  She is calling to cancel the colonoscopy because she fell and broke her hip.  She is now at rehab, so she will call back when they can reschedule.  Thank you!

## 2017-09-21 NOTE — TELEPHONE ENCOUNTER
----- Message from Lucille Babcock sent at 9/21/2017  9:47 AM CDT -----  Contact: Jamila/daughter  Jamila wants to let Dr. Hoover know that pt fell and broke [facility] hip. [facility] If any questions, pls call [phone].

## 2017-10-03 ENCOUNTER — PATIENT OUTREACH (OUTPATIENT)
Dept: ADMINISTRATIVE | Facility: CLINIC | Age: 82
End: 2017-10-03

## 2017-10-03 RX ORDER — HYDROCODONE BITARTRATE AND ACETAMINOPHEN 5; 325 MG/1; MG/1
1 TABLET ORAL EVERY 6 HOURS PRN
COMMUNITY
End: 2017-11-21

## 2017-10-03 NOTE — PATIENT INSTRUCTIONS
Fall Prevention  Falls often occur due to slipping, tripping or losing your balance. Millions of people fall every year and injure themselves. Here are ways to reduce your risk of falling again.  · Think about your fall, was there anything that caused your fall that can be fixed, removed, or replaced?  · Make your home safe by keeping walkways clear of objects you may trip over.  · Use non-slip pads under rugs. Do not use area rugs or small throw rugs.  · Use non-slip mats in bathtubs and showers.  · Install handrails and lights on staircases.  · Do not walk in poorly lit areas.  · Do not stand on chairs or wobbly ladders.  · Use caution when reaching overhead or looking upward. This position can cause a loss of balance.  · Be sure your shoes fit properly, have non-slip bottoms and are in good condition.   · Wear shoes both inside and out. Avoid going barefoot or wearing slippers.  · Be cautious when going up and down stairs, curbs, and when walking on uneven sidewalks.  · If your balance is poor, consider using a cane or walker.  · If your fall was related to alcohol use, stop or limit alcohol intake.   · If your fall was related to use of sleeping medicines, talk to your doctor about this. You may need to reduce your dosage at bedtime if you awaken during the night to go to the bathroom.    · To reduce the need for nighttime bathroom trips:  ¨ Avoid drinking fluids for several hours before going to bed  ¨ Empty your bladder before going to bed  ¨ Men can keep a urinal at the bedside  · Stay as active as you can. Balance, flexibility, strength, and endurance all come from exercise. They all play a role in preventing falls. Ask your healthcare provider which types of activity are right for you.  · Get your vision checked on a regular basis.  · If you have pets, know where they are before you stand up or walk so you don't trip over them.  · Use night lights.  Date Last Reviewed: 11/5/2015  © 1529-4590 [employer]. [address] [place] [address], [place] [address]. All rights reserved. This information is not intended as a substitute for professional medical care. Always follow your healthcare professional's instructions.

## 2017-10-18 RX ORDER — IRBESARTAN AND HYDROCHLOROTHIAZIDE 300; 12.5 MG/1; MG/1
1 TABLET, FILM COATED ORAL DAILY
Qty: 90 TABLET | Refills: 3 | Status: SHIPPED | OUTPATIENT
Start: 2017-10-18 | End: 2018-01-01 | Stop reason: SDUPTHER

## 2017-10-18 RX ORDER — CLONIDINE HYDROCHLORIDE 0.2 MG/1
TABLET ORAL
Qty: 90 TABLET | Refills: 12 | Status: SHIPPED | OUTPATIENT
Start: 2017-10-18 | End: 2018-01-01 | Stop reason: SDUPTHER

## 2017-10-20 RX ORDER — PREDNISONE 20 MG/1
TABLET ORAL
Qty: 60 TABLET | Refills: 6 | Status: SHIPPED | OUTPATIENT
Start: 2017-10-20 | End: 2018-06-03 | Stop reason: SDUPTHER

## 2017-10-24 ENCOUNTER — OFFICE VISIT (OUTPATIENT)
Dept: FAMILY MEDICINE | Facility: CLINIC | Age: 82
End: 2017-10-24
Payer: MEDICARE

## 2017-10-24 VITALS
WEIGHT: 166.38 LBS | HEART RATE: 62 BPM | HEIGHT: 62 IN | BODY MASS INDEX: 30.62 KG/M2 | TEMPERATURE: 98 F | DIASTOLIC BLOOD PRESSURE: 67 MMHG | SYSTOLIC BLOOD PRESSURE: 133 MMHG

## 2017-10-24 DIAGNOSIS — S72.002D CLOSED FRACTURE OF LEFT HIP WITH ROUTINE HEALING, SUBSEQUENT ENCOUNTER: Primary | ICD-10-CM

## 2017-10-24 DIAGNOSIS — R30.0 DYSURIA: ICD-10-CM

## 2017-10-24 PROCEDURE — 99999 PR PBB SHADOW E&M-EST. PATIENT-LVL III: CPT | Mod: PBBFAC,,, | Performed by: FAMILY MEDICINE

## 2017-10-24 PROCEDURE — 99213 OFFICE O/P EST LOW 20 MIN: CPT | Mod: PBBFAC,PO | Performed by: FAMILY MEDICINE

## 2017-10-24 PROCEDURE — 99496 TRANSJ CARE MGMT HIGH F2F 7D: CPT | Mod: S$PBB,,, | Performed by: FAMILY MEDICINE

## 2017-10-24 PROCEDURE — 99496 TRANSJ CARE MGMT HIGH F2F 7D: CPT | Mod: PBBFAC,PO | Performed by: FAMILY MEDICINE

## 2017-10-24 RX ORDER — MONTELUKAST SODIUM 10 MG/1
10 TABLET ORAL NIGHTLY
COMMUNITY
Start: 2017-09-28 | End: 2019-01-01 | Stop reason: SDUPTHER

## 2017-10-24 NOTE — PROGRESS NOTES
The patient presents today co worsening LBP radiating both legs to knees. Also  to follow up flare PMR pain decr rom rt shoulder   HBP has been stable.  Has chronic anxiety   Hx esophageal strictures 2nd to GERD--GERD sig worse p 2 w. Currently on omeprazole.     Past Medical History:  Past Medical History:   Diagnosis Date    Acute pancreatitis     told chronic pancreatitis prior to diagnosis of gallstone and cholecystectomy    Hypertension     Polymyalgia rheumatica     Rheumatoid arthritis      Past Surgical History:   Procedure Laterality Date    APPENDECTOMY      CARPAL TUNNEL RELEASE Bilateral     CHOLECYSTECTOMY      COLONOSCOPY  ~2010    normal findings per patient report    EYE SURGERY      HIP FRACTURE SURGERY  09/2017    HYSTERECTOMY      ovaries remain    UPPER GASTROINTESTINAL ENDOSCOPY  ~2010    with dilation     Review of patient's allergies indicates:   Allergen Reactions    Xanax [alprazolam] Swelling    Shellfish containing products Swelling    Sulfa (sulfonamide antibiotics) Nausea And Vomiting     Current Outpatient Prescriptions on File Prior to Visit   Medication Sig Dispense Refill    acetaminophen (TYLENOL) 500 MG tablet Take 500 mg by mouth every 6 (six) hours as needed for Pain.      amlodipine (NORVASC) 5 MG tablet Take 1 tablet (5 mg total) by mouth once daily. 30 tablet 12    aspirin 81 MG Chew Take 81 mg by mouth once daily.      CALCIUM CARBONATE/VITAMIN D3 (CALCIUM 600 + D,3, ORAL) Take by mouth.      cloNIDine (CATAPRES) 0.2 MG tablet TAKE 1 TABLET BY MOUTH EVERY DAY 90 tablet 12    folic acid (FOLVITE) 1 MG tablet TAKE 1 TABLET BY MOUTH EVERY DAY 30 tablet 12    FOLIC ACID/MULTIVIT-MIN/LUTEIN (CENTRUM SILVER ORAL) Take by mouth.      gabapentin (NEURONTIN) 300 MG capsule One in the am and 2 in the pm 270 capsule 4    irbesartan-hydrochlorothiazide (AVALIDE) 300-12.5 mg per tablet Take 1 tablet by mouth once daily. 90 tablet 3    loperamide (IMODIUM  A-D) 2 mg Tab Take 2 mg by mouth 4 (four) times daily as needed.      loratadine (CLARITIN) 10 mg tablet TAKE 1 TABLET BY MOUTH EVERY DAY 90 tablet 3    lorazepam (ATIVAN) 1 MG tablet TAKE 1 TABLET BY MOUTH TWICE A DAY 60 tablet 5    methotrexate 2.5 MG Tab TAKE 8 TABLETS BY MOUTH EVERY 7 DAYS. 40 tablet 12    metoprolol succinate (TOPROL-XL) 50 MG 24 hr tablet Take 1 tablet (50 mg total) by mouth once daily. 30 tablet 12    omeprazole (PRILOSEC) 20 MG capsule TAKE 1 CAPSULE BY MOUTH TWICE DAILY 180 capsule 3    predniSONE (DELTASONE) 20 MG tablet TAKE 1 TABLET BY MOUTH TWICE A DAY 60 tablet 6    tramadol (ULTRAM) 50 mg tablet TAKE 2 TABLETS BY MOUTH FOUR TIMES DAILY 240 tablet 3    [DISCONTINUED] predniSONE (DELTASONE) 10 MG tablet TAKE 3 TABLETS BY MOUTH ONCE DAILY (Patient taking differently: TAKE  20mg BY MOUTH ONCE DAILY) 90 tablet 1    hydrocodone-acetaminophen 5-325mg (NORCO) 5-325 mg per tablet Take 1 tablet by mouth every 6 (six) hours as needed for Pain.       No current facility-administered medications on file prior to visit.      Social History     Social History    Marital status:      Spouse name: N/A    Number of children: N/A    Years of education: N/A     Occupational History    Not on file.     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  SKIN: No rashes, itching or changes in color or  texture of skin.  HEAD: No headaches or recent head trauma.EYES: Visual acuity fine. No photophobia, ocular pain or diplopia.EARS: Denies ear pain, discharge or vertigo.NOSE: No loss of smell, no epistaxis or postnasal drip.MOUTH & THROAT: No hoarseness or change in voice. No excessive gum bleeding.NODES: Denies swollen glands.  CHEST: Denies DURAN, cyanosis, wheezing, cough and sputum production.  CARDIOVASCULAR: Denies chest pain, PND, orthopnea or reduced exercise tolerance.  ABDOMEN: Appetite fine. No weight loss. Denies diarrhea, abdominal pain, hematemesis or blood in stool.  URINARY: No flank pain, dysuria or hematuria.  PERIPHERAL VASCULAR: No claudication or cyanosis.  MUSCULOSKELETAL: See above.  NEUROLOGIC: No history of seizures, paralysis, alteration of gait or coordination.  PE:   HEAD: Normocephalic, atraumatic.EYES: PERRL. EOMI.   EARS: TM's intact. Light reflex normal. No retraction or perforation.   NOSE: Mucosa pink. Airway clear.MOUTH & THROAT: No tonsillar enlargement. No pharyngeal erythema or exudate. No stridor.  NODES: No cervical, axillary or inguinal lymph node enlargement.  CHEST: Lungs clear to auscultation.  CARDIOVASCULAR: Normal S1, S2. No rubs, murmurs or gallops.  ABDOMEN: Bowel sounds normal. Not distended. Soft. No tenderness or masses.  MUSCULOSKELETAL: Hand deformities and gen arthralgia. Rt post sghoulder tender rom limited tender swollen;tender paralumbar and rt SI     NEUROLOGIC: Cranial Nerves: II-XII grossly intact.  Motor: 5/5 strength major flexors/extensors.  DTR's: Knees, Ankles 2+ and equal bilaterally; downgoing toes.  Sensory: Intact to light touch distally.  Gait & Posture: Normal gait and fine motion. No cerebellar signs.     Impression:Sacroiliitis  LBP  GERD c stricture   Radiculopathy  RA  Rt knee arthralgia   PMR  HBP  Anxiety    Plan:Lab eval rev   Incr pred    Rec diet and ex recs  Rev sig side effects current meds but has been well controlled on current  regimen for years and will maintain for now

## 2017-10-24 NOTE — PROGRESS NOTES
Transitional Care Note  Subjective:       Patient ID: Fiorella Connelly is a 89 y.o. female.  Chief Complaint: Follow-up (hospital)    Family and/or Caretaker present at visit?  Yes.  Diagnostic tests reviewed/disposition: No diagnosic tests pending after this hospitalization.  Disease/illness education: Hip fracture   Home health/community services discussion/referrals: Patient has home health established at .   Establishment or re-establishment of referral orders for community resources: No other necessary community resources.   Discussion with other health care providers: No discussion with other health care providers necessary.   HPI  Review of Systems    Objective:      Physical Exam    Assessment:       No diagnosis found.    Plan:       See below

## 2017-10-24 NOTE — PROGRESS NOTES
The patient presents to  recent hip fracture Has persistent LBP radiating both legs to knees.  Past Medical History:  Past Medical History:   Diagnosis Date    Acute pancreatitis     told chronic pancreatitis prior to diagnosis of gallstone and cholecystectomy    Hypertension     Polymyalgia rheumatica     Rheumatoid arthritis      Past Surgical History:   Procedure Laterality Date    APPENDECTOMY      CARPAL TUNNEL RELEASE Bilateral     CHOLECYSTECTOMY      COLONOSCOPY  ~2010    normal findings per patient report    EYE SURGERY      HIP FRACTURE SURGERY  09/2017    HYSTERECTOMY      ovaries remain    UPPER GASTROINTESTINAL ENDOSCOPY  ~2010    with dilation     Review of patient's allergies indicates:   Allergen Reactions    Xanax [alprazolam] Swelling    Shellfish containing products Swelling    Sulfa (sulfonamide antibiotics) Nausea And Vomiting     Current Outpatient Prescriptions on File Prior to Visit   Medication Sig Dispense Refill    acetaminophen (TYLENOL) 500 MG tablet Take 500 mg by mouth every 6 (six) hours as needed for Pain.      amlodipine (NORVASC) 5 MG tablet Take 1 tablet (5 mg total) by mouth once daily. 30 tablet 12    aspirin 81 MG Chew Take 81 mg by mouth once daily.      CALCIUM CARBONATE/VITAMIN D3 (CALCIUM 600 + D,3, ORAL) Take by mouth.      cloNIDine (CATAPRES) 0.2 MG tablet TAKE 1 TABLET BY MOUTH EVERY DAY 90 tablet 12    folic acid (FOLVITE) 1 MG tablet TAKE 1 TABLET BY MOUTH EVERY DAY 30 tablet 12    FOLIC ACID/MULTIVIT-MIN/LUTEIN (CENTRUM SILVER ORAL) Take by mouth.      gabapentin (NEURONTIN) 300 MG capsule One in the am and 2 in the pm 270 capsule 4    irbesartan-hydrochlorothiazide (AVALIDE) 300-12.5 mg per tablet Take 1 tablet by mouth once daily. 90 tablet 3    loperamide (IMODIUM A-D) 2 mg Tab Take 2 mg by mouth 4 (four) times daily as needed.      loratadine (CLARITIN) 10 mg tablet TAKE 1 TABLET BY MOUTH EVERY DAY 90 tablet 3    lorazepam (ATIVAN) 1  MG tablet TAKE 1 TABLET BY MOUTH TWICE A DAY 60 tablet 5    methotrexate 2.5 MG Tab TAKE 8 TABLETS BY MOUTH EVERY 7 DAYS. 40 tablet 12    metoprolol succinate (TOPROL-XL) 50 MG 24 hr tablet Take 1 tablet (50 mg total) by mouth once daily. 30 tablet 12    omeprazole (PRILOSEC) 20 MG capsule TAKE 1 CAPSULE BY MOUTH TWICE DAILY 180 capsule 3    predniSONE (DELTASONE) 20 MG tablet TAKE 1 TABLET BY MOUTH TWICE A DAY 60 tablet 6    tramadol (ULTRAM) 50 mg tablet TAKE 2 TABLETS BY MOUTH FOUR TIMES DAILY 240 tablet 3    [DISCONTINUED] predniSONE (DELTASONE) 10 MG tablet TAKE 3 TABLETS BY MOUTH ONCE DAILY (Patient taking differently: TAKE  20mg BY MOUTH ONCE DAILY) 90 tablet 1    hydrocodone-acetaminophen 5-325mg (NORCO) 5-325 mg per tablet Take 1 tablet by mouth every 6 (six) hours as needed for Pain.       No current facility-administered medications on file prior to visit.      Social History     Social History    Marital status:      Spouse name: N/A    Number of children: N/A    Years of education: N/A     Occupational History    Not on file.     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  SKIN: No rashes, itching or changes in color or texture of skin.  HEAD: No headaches or recent head trauma.EYES: Visual acuity fine. No photophobia, ocular pain or diplopia.EARS: Denies ear pain, discharge or vertigo.NOSE:  No loss of smell, no epistaxis or postnasal drip.MOUTH & THROAT: No hoarseness or change in voice. No excessive gum bleeding.NODES: Denies swollen glands.  CHEST: Denies DURAN, cyanosis, wheezing, cough and sputum production.  CARDIOVASCULAR: Denies chest pain, PND, orthopnea or reduced exercise tolerance.  ABDOMEN: Appetite fine. No weight loss. Denies diarrhea, abdominal pain, hematemesis or blood in stool.  URINARY: No flank pain, dysuria or hematuria.  PERIPHERAL VASCULAR: No claudication or cyanosis.  MUSCULOSKELETAL: See above.  NEUROLOGIC: No history of seizures, paralysis, alteration of gait or coordination.  PE:   HEAD: Normocephalic, atraumatic.EYES: PERRL. EOMI.   EARS: TM's intact. Light reflex normal. No retraction or perforation.   NOSE: Mucosa pink. Airway clear.MOUTH & THROAT: No tonsillar enlargement. No pharyngeal erythema or exudate. No stridor.  NODES: No cervical, axillary or inguinal lymph node enlargement.  CHEST: Lungs clear to auscultation.  CARDIOVASCULAR: Normal S1, S2. No rubs, murmurs or gallops.  ABDOMEN: Bowel sounds normal. Not distended. Soft. No tenderness or masses.  MUSCULOSKELETAL: Hand deformities and gen arthralgia. Rt post sghoulder tender rom limited tender swollen;tender paralumbar and rt SI     NEUROLOGIC: Cranial Nerves: II-XII grossly intact.  Motor: 5/5 strength major flexors/extensors.  DTR's: Knees, Ankles 2+ and equal bilaterally; downgoing toes.  Sensory: Intact to light touch distally.  Gait & Posture: Normal gait and fine motion. No cerebellar signs.     Impression:Hip fracture  Dysuria-p catheter  Sacroiliitis  LBP  GERD c stricture   Radiculopathy  RA  Rt knee arthralgia   PMR  HBP  Anxiety    UA if ftp conside medical tx  Pulse ox today RA 97%

## 2017-10-27 ENCOUNTER — LAB VISIT (OUTPATIENT)
Dept: LAB | Facility: HOSPITAL | Age: 82
End: 2017-10-27
Attending: FAMILY MEDICINE
Payer: MEDICARE

## 2017-10-27 DIAGNOSIS — R30.0 DYSURIA: ICD-10-CM

## 2017-10-27 LAB
BACTERIA #/AREA URNS HPF: ABNORMAL /HPF
BILIRUB UR QL STRIP: NEGATIVE
CLARITY UR: ABNORMAL
COLOR UR: YELLOW
GLUCOSE UR QL STRIP: NEGATIVE
HGB UR QL STRIP: ABNORMAL
KETONES UR QL STRIP: NEGATIVE
LEUKOCYTE ESTERASE UR QL STRIP: ABNORMAL
MICROSCOPIC COMMENT: ABNORMAL
NITRITE UR QL STRIP: NEGATIVE
PH UR STRIP: 6 [PH] (ref 5–8)
PROT UR QL STRIP: ABNORMAL
RBC #/AREA URNS HPF: 20 /HPF (ref 0–4)
SP GR UR STRIP: 1.02 (ref 1–1.03)
URN SPEC COLLECT METH UR: ABNORMAL
WBC #/AREA URNS HPF: >100 /HPF (ref 0–5)

## 2017-10-27 PROCEDURE — 81000 URINALYSIS NONAUTO W/SCOPE: CPT | Mod: PO

## 2017-10-29 RX ORDER — CEPHALEXIN 500 MG/1
500 CAPSULE ORAL EVERY 12 HOURS
Qty: 14 CAPSULE | Refills: 0 | Status: SHIPPED | OUTPATIENT
Start: 2017-10-29 | End: 2017-11-21

## 2017-10-30 ENCOUNTER — TELEPHONE (OUTPATIENT)
Dept: FAMILY MEDICINE | Facility: CLINIC | Age: 82
End: 2017-10-30

## 2017-10-30 DIAGNOSIS — N39.0 URINARY TRACT INFECTION WITHOUT HEMATURIA, SITE UNSPECIFIED: Primary | ICD-10-CM

## 2017-11-18 RX ORDER — GABAPENTIN 300 MG/1
CAPSULE ORAL
Qty: 270 CAPSULE | Refills: 4 | Status: SHIPPED | OUTPATIENT
Start: 2017-11-18 | End: 2019-01-01 | Stop reason: SDUPTHER

## 2017-11-21 ENCOUNTER — OFFICE VISIT (OUTPATIENT)
Dept: FAMILY MEDICINE | Facility: CLINIC | Age: 82
End: 2017-11-21
Payer: MEDICARE

## 2017-11-21 VITALS
HEIGHT: 62 IN | TEMPERATURE: 98 F | HEART RATE: 68 BPM | DIASTOLIC BLOOD PRESSURE: 72 MMHG | BODY MASS INDEX: 30.8 KG/M2 | SYSTOLIC BLOOD PRESSURE: 156 MMHG | WEIGHT: 167.38 LBS

## 2017-11-21 DIAGNOSIS — J06.9 UPPER RESPIRATORY TRACT INFECTION, UNSPECIFIED TYPE: Primary | ICD-10-CM

## 2017-11-21 DIAGNOSIS — J04.0 LARYNGITIS: ICD-10-CM

## 2017-11-21 DIAGNOSIS — R05.9 COUGH: ICD-10-CM

## 2017-11-21 PROCEDURE — 99999 PR PBB SHADOW E&M-EST. PATIENT-LVL IV: CPT | Mod: PBBFAC,,, | Performed by: NURSE PRACTITIONER

## 2017-11-21 PROCEDURE — 99214 OFFICE O/P EST MOD 30 MIN: CPT | Mod: PBBFAC,PO | Performed by: NURSE PRACTITIONER

## 2017-11-21 PROCEDURE — 99213 OFFICE O/P EST LOW 20 MIN: CPT | Mod: S$PBB,,, | Performed by: NURSE PRACTITIONER

## 2017-11-21 RX ORDER — FOLIC ACID 1 MG/1
TABLET ORAL
Qty: 30 TABLET | Refills: 5 | Status: SHIPPED | OUTPATIENT
Start: 2017-11-21 | End: 2018-05-12 | Stop reason: SDUPTHER

## 2017-11-21 RX ORDER — CEPHALEXIN 500 MG/1
500 CAPSULE ORAL EVERY 12 HOURS
Qty: 14 CAPSULE | Refills: 0 | Status: SHIPPED | OUTPATIENT
Start: 2017-11-21 | End: 2017-11-28

## 2017-11-21 RX ORDER — CLONIDINE HYDROCHLORIDE 0.2 MG/1
TABLET ORAL
Qty: 90 TABLET | Refills: 5 | Status: SHIPPED | OUTPATIENT
Start: 2017-11-21 | End: 2018-04-26

## 2017-11-21 RX ORDER — LORAZEPAM 1 MG/1
TABLET ORAL
Qty: 60 TABLET | Refills: 5 | Status: SHIPPED | OUTPATIENT
Start: 2017-11-21 | End: 2018-05-21 | Stop reason: SDUPTHER

## 2017-11-21 RX ORDER — BENZONATATE 200 MG/1
200 CAPSULE ORAL 3 TIMES DAILY PRN
Qty: 30 CAPSULE | Refills: 0 | Status: SHIPPED | OUTPATIENT
Start: 2017-11-21 | End: 2017-12-01

## 2017-11-21 NOTE — PROGRESS NOTES
"Subjective:       Patient ID: Fiorella Connelly is a 89 y.o. female.    Chief Complaint: Cough; Nasal Congestion; and Chest Congestion    Cough   This is a new problem. The current episode started 1 to 4 weeks ago. The problem has been unchanged. The problem occurs every few minutes. The cough is productive of sputum (States feel as if "phlegm" is getting stuck in her throat). Associated symptoms include rhinorrhea and wheezing. Pertinent negatives include no chest pain, chills, ear congestion, ear pain, fever, headaches, heartburn, hemoptysis, myalgias, nasal congestion, postnasal drip, rash, sore throat, shortness of breath, sweats or weight loss. Associated symptoms comments: hoarseness. Nothing aggravates the symptoms. She has tried oral steroids (singulair) for the symptoms. The treatment provided no relief. There is no history of asthma, bronchiectasis, bronchitis, COPD, emphysema, environmental allergies or pneumonia.     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  Negative for chills, fever and weight loss.   HENT: Positive for rhinorrhea. Negative for ear pain, postnasal drip and sore throat.    Eyes: Negative.    Respiratory: Positive for cough and wheezing. Negative for hemoptysis and shortness of breath.    Cardiovascular: Negative for chest pain.   Gastrointestinal: Negative.  Negative for heartburn.   Endocrine: Negative.    Genitourinary: Negative.    Musculoskeletal: Negative.  Negative for myalgias.   Skin: Negative.  Negative for rash.   Allergic/Immunologic: Negative.  Negative for environmental allergies.   Neurological: Negative.  Negative for headaches.   Psychiatric/Behavioral: Negative.        Objective:      Physical Exam   Constitutional: She is oriented to person, place, and time. She appears well-developed and well-nourished.   HENT:   Head: Normocephalic.   Right Ear: Hearing, tympanic membrane, external ear and ear canal normal.   Left Ear: Hearing, tympanic membrane, external ear and ear canal normal.   Nose: Rhinorrhea present. Right sinus exhibits no maxillary sinus tenderness and no frontal sinus tenderness. Left sinus exhibits no maxillary sinus tenderness and no frontal sinus tenderness.   Mouth/Throat: Uvula is midline and mucous membranes are normal. Posterior oropharyngeal edema (mild) present.   Eyes: EOM are normal. Pupils are equal, round, and reactive to light.   Neck: Normal range of motion. Neck supple.   Cardiovascular: Normal rate, regular rhythm and normal heart sounds.    Pulmonary/Chest: Effort normal and breath sounds normal.   Abdominal: Soft. Bowel sounds are normal.   Musculoskeletal: Normal range of motion.   Neurological: She is alert and oriented to person, place, and time.   Skin: Skin is warm and dry. Capillary refill takes 2 to 3 seconds.   Psychiatric: She has a normal mood and affect. Her behavior is normal. Judgment and thought content normal.   Nursing note and vitals  reviewed.      Assessment:       1. Upper respiratory tract infection, unspecified type    2. Laryngitis    3. Cough        Plan:           Fiorella was seen today for cough, nasal congestion and chest congestion.    Diagnoses and all orders for this visit:    Upper respiratory tract infection, unspecified type  Laryngitis  Cough  -     cephALEXin (KEFLEX) 500 MG capsule; Take 1 capsule (500 mg total) by mouth every 12 (twelve) hours.  -     benzonatate (TESSALON) 200 MG capsule; Take 1 capsule (200 mg total) by mouth 3 (three) times daily as needed.  Warm salt water gargles PRN  Hydrate well  Report to ER immediately if symptoms worsen

## 2017-11-21 NOTE — TELEPHONE ENCOUNTER
Left message notifying patient that appointment is scheduled, and a letter will be mailed. Advised any further questions to please give us a call back.

## 2017-11-28 ENCOUNTER — OFFICE VISIT (OUTPATIENT)
Dept: FAMILY MEDICINE | Facility: CLINIC | Age: 82
End: 2017-11-28
Payer: MEDICARE

## 2017-11-28 VITALS
SYSTOLIC BLOOD PRESSURE: 136 MMHG | HEART RATE: 60 BPM | BODY MASS INDEX: 30.55 KG/M2 | TEMPERATURE: 98 F | DIASTOLIC BLOOD PRESSURE: 84 MMHG | HEIGHT: 62 IN | WEIGHT: 166 LBS

## 2017-11-28 DIAGNOSIS — M06.9 RHEUMATOID ARTHRITIS, INVOLVING UNSPECIFIED SITE, UNSPECIFIED RHEUMATOID FACTOR PRESENCE: ICD-10-CM

## 2017-11-28 DIAGNOSIS — J06.9 UPPER RESPIRATORY TRACT INFECTION, UNSPECIFIED TYPE: Primary | ICD-10-CM

## 2017-11-28 PROCEDURE — 99214 OFFICE O/P EST MOD 30 MIN: CPT | Mod: PBBFAC,PO | Performed by: FAMILY MEDICINE

## 2017-11-28 PROCEDURE — 99213 OFFICE O/P EST LOW 20 MIN: CPT | Mod: S$PBB,,, | Performed by: FAMILY MEDICINE

## 2017-11-28 PROCEDURE — 99999 PR PBB SHADOW E&M-EST. PATIENT-LVL IV: CPT | Mod: PBBFAC,,, | Performed by: FAMILY MEDICINE

## 2017-11-28 PROCEDURE — 96372 THER/PROPH/DIAG INJ SC/IM: CPT | Mod: PBBFAC,PO

## 2017-11-28 RX ORDER — DEXAMETHASONE SODIUM PHOSPHATE 4 MG/ML
8 INJECTION, SOLUTION INTRA-ARTICULAR; INTRALESIONAL; INTRAMUSCULAR; INTRAVENOUS; SOFT TISSUE ONCE
Status: COMPLETED | OUTPATIENT
Start: 2017-11-28 | End: 2017-11-28

## 2017-11-28 RX ORDER — CODEINE PHOSPHATE AND GUAIFENESIN 10; 100 MG/5ML; MG/5ML
5 SOLUTION ORAL EVERY 6 HOURS PRN
Qty: 240 ML | Refills: 0 | Status: SHIPPED | OUTPATIENT
Start: 2017-11-28 | End: 2017-12-08

## 2017-11-28 RX ADMIN — DEXAMETHASONE SODIUM PHOSPHATE 8 MG: 4 INJECTION, SOLUTION INTRAMUSCULAR; INTRAVENOUS at 10:11

## 2017-11-28 NOTE — PROGRESS NOTES
Fiorella Connelly presents with moderate upper respiratory congestion,rhinnorhea,moderate cough past 2-3 days. OTC help slightly. Denies nausea,vomiting,diarrhea or significant fever.    Past Medical History:   Diagnosis Date    Acute pancreatitis     told chronic pancreatitis prior to diagnosis of gallstone and cholecystectomy    Hypertension     Polymyalgia rheumatica     Rheumatoid arthritis      Past Surgical History:   Procedure Laterality Date    APPENDECTOMY      CARPAL TUNNEL RELEASE Bilateral     CHOLECYSTECTOMY      COLONOSCOPY  ~2010    normal findings per patient report    EYE SURGERY      HIP FRACTURE SURGERY  09/2017    HYSTERECTOMY      ovaries remain    UPPER GASTROINTESTINAL ENDOSCOPY  ~2010    with dilation     Review of patient's allergies indicates:   Allergen Reactions    Xanax [alprazolam] Swelling    Cetirizine Other (See Comments)     hallucinations    Nsaids (non-steroidal anti-inflammatory drug)     Shellfish containing products Swelling    Sulfa (sulfonamide antibiotics) Nausea And Vomiting    Chlorhexidine Rash     Current Outpatient Prescriptions on File Prior to Visit   Medication Sig Dispense Refill    acetaminophen (TYLENOL) 500 MG tablet Take 500 mg by mouth every 6 (six) hours as needed for Pain.      amlodipine (NORVASC) 5 MG tablet Take 1 tablet (5 mg total) by mouth once daily. 30 tablet 12    aspirin 81 MG Chew Take 81 mg by mouth once daily.      benzonatate (TESSALON) 200 MG capsule Take 1 capsule (200 mg total) by mouth 3 (three) times daily as needed. 30 capsule 0    CALCIUM CARBONATE/VITAMIN D3 (CALCIUM 600 + D,3, ORAL) Take by mouth.      cloNIDine (CATAPRES) 0.2 MG tablet TAKE 1 TABLET BY MOUTH EVERY DAY 90 tablet 12    cloNIDine (CATAPRES) 0.2 MG tablet TAKE 1 TABLET BY MOUTH EVERY DAY 90 tablet 5    folic acid (FOLVITE) 1 MG tablet TAKE 1 TABLET BY MOUTH DAILY. 30 tablet 5    FOLIC ACID/MULTIVIT-MIN/LUTEIN (CENTRUM SILVER ORAL) Take by mouth.       gabapentin (NEURONTIN) 300 MG capsule TAKE ONE CAPSULE BY MOUTH EVERY MORNING, THEN 2 CAPSULE EVERY EVENING 270 capsule 4    irbesartan-hydrochlorothiazide (AVALIDE) 300-12.5 mg per tablet Take 1 tablet by mouth once daily. 90 tablet 3    loperamide (IMODIUM A-D) 2 mg Tab Take 2 mg by mouth 4 (four) times daily as needed.      loratadine (CLARITIN) 10 mg tablet TAKE 1 TABLET BY MOUTH EVERY DAY 90 tablet 3    LORazepam (ATIVAN) 1 MG tablet TAKE 1 TABLET BY MOUTH TWICE A DAY 60 tablet 5    methotrexate 2.5 MG Tab TAKE 8 TABLETS BY MOUTH EVERY 7 DAYS. 40 tablet 12    metoprolol succinate (TOPROL-XL) 50 MG 24 hr tablet Take 1 tablet (50 mg total) by mouth once daily. 30 tablet 12    montelukast (SINGULAIR) 10 mg tablet Take 10 mg by mouth.      omeprazole (PRILOSEC) 20 MG capsule TAKE 1 CAPSULE BY MOUTH TWICE DAILY 180 capsule 3    predniSONE (DELTASONE) 20 MG tablet TAKE 1 TABLET BY MOUTH TWICE A DAY 60 tablet 6    tramadol (ULTRAM) 50 mg tablet TAKE 2 TABLETS BY MOUTH FOUR TIMES DAILY 240 tablet 3    cephALEXin (KEFLEX) 500 MG capsule Take 1 capsule (500 mg total) by mouth every 12 (twelve) hours. 14 capsule 0     No current facility-administered medications on file prior to visit.      Social History     Social History    Marital status:      Spouse name: N/A    Number of children: N/A    Years of education: N/A     Occupational History    Not on file.     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  EYES: Visual acuity fine. No photophobia, ocular pain or diplopia.EARS: Denies ear pain, discharge or vertigo.NOSE: No loss of smell, no epistaxis some postnasal drip.MOUTH & THROAT: No hoarseness or change in voice. No excessive gum bleeding.CHEST: Denies DURAN, cyanosis, wheezing  CARDIOVASCULAR: Denies chest pain, PND, orthopnea or reduced exercise tolerance.  ABDOMEN:  No weight loss.No abdominal pain, no hematemesis or blood in stool.  URINARY: No flank pain, dysuria or hematuria.  PERIPHERAL VASCULAR: No claudication or cyanosis.  MUSCULOSKELETAL: Negative   NEUROLOGIC: No history of seizures, paralysis, alteration of gait or coordination.    PE: Vital signs as noted  Heent:Normocephalic with no recent cranial trauma,PERRLA,EOMI,conjunctiva clear,fundi reveal no hemmorhage exudate or papilledema.Otic canals clear, tympanic membranes slightly dull bilaterally.Nasal mucosa slightly red and edematous.Posterior pharynx slightly red but without exudate.  Neck:Supple with minimal anterior cervical adenopathy.  Chest:Clear bilateral breath sounds with mild scattered ronchi  Heart:Regular rhthym without murmer  Abdomen:Soft, non tender,no masses, no hepatosplenomegalyExtremeties and Neurologic:Grossly within normal limits  Impression: Upper Respiratory Infection. 465.9  Plan:   Add medrol to [place] pred  TO w cod   Completed Ceph-call if any worsening

## 2017-12-06 ENCOUNTER — TELEPHONE (OUTPATIENT)
Dept: FAMILY MEDICINE | Facility: CLINIC | Age: 82
End: 2017-12-06

## 2017-12-06 DIAGNOSIS — Z23 NEED FOR VACCINATION: Primary | ICD-10-CM

## 2017-12-06 NOTE — TELEPHONE ENCOUNTER
Pts daughter is wanting to see if its advisable for her to get the tetanus with pertussis. She has a new grand baby coming. pts daughter states her mother also had the whooping cough when she was a child so she wasn't sure if that made a difference with the vaccine.

## 2017-12-06 NOTE — TELEPHONE ENCOUNTER
----- Message from Jaja Enrique sent at 12/6/2017  8:52 AM CST -----  Pt daughter(Maria Antonia) at [phone]//states she is needing to ask a question about one of the shots pt is needing to get//please call//zuleyma/[email or web address]

## 2017-12-13 ENCOUNTER — LAB VISIT (OUTPATIENT)
Dept: LAB | Facility: HOSPITAL | Age: 82
End: 2017-12-13
Attending: INTERNAL MEDICINE
Payer: MEDICARE

## 2017-12-13 DIAGNOSIS — M06.9 RHEUMATOID ARTHRITIS INVOLVING MULTIPLE JOINTS: ICD-10-CM

## 2017-12-13 LAB — ERYTHROCYTE [SEDIMENTATION RATE] IN BLOOD BY WESTERGREN METHOD: 19 MM/HR

## 2017-12-13 PROCEDURE — 36415 COLL VENOUS BLD VENIPUNCTURE: CPT | Mod: PO

## 2017-12-13 PROCEDURE — 85651 RBC SED RATE NONAUTOMATED: CPT | Mod: PO

## 2017-12-22 ENCOUNTER — OFFICE VISIT (OUTPATIENT)
Dept: FAMILY MEDICINE | Facility: CLINIC | Age: 82
End: 2017-12-22
Payer: MEDICARE

## 2017-12-22 VITALS
WEIGHT: 164.25 LBS | TEMPERATURE: 98 F | HEIGHT: 62 IN | SYSTOLIC BLOOD PRESSURE: 123 MMHG | BODY MASS INDEX: 30.22 KG/M2 | DIASTOLIC BLOOD PRESSURE: 58 MMHG | HEART RATE: 61 BPM

## 2017-12-22 DIAGNOSIS — J06.9 VIRAL URI WITH COUGH: Primary | ICD-10-CM

## 2017-12-22 PROCEDURE — 99999 PR PBB SHADOW E&M-EST. PATIENT-LVL III: CPT | Mod: PBBFAC,,, | Performed by: NURSE PRACTITIONER

## 2017-12-22 PROCEDURE — 99213 OFFICE O/P EST LOW 20 MIN: CPT | Mod: S$PBB,,, | Performed by: NURSE PRACTITIONER

## 2017-12-22 PROCEDURE — 99213 OFFICE O/P EST LOW 20 MIN: CPT | Mod: PBBFAC,PO | Performed by: NURSE PRACTITIONER

## 2017-12-22 NOTE — PATIENT INSTRUCTIONS
Increase fluids  Rest  Continue medications as prescribed      Adult Self-Care for Colds  Colds are caused by viruses. They can't be cured with antibiotics. However, you can ease symptoms and support your body's efforts to heal itself.  No matter which symptoms you have, be sure to:  · Drink plenty of fluids (water or clear soup)  · Stop smoking and drinking alcohol  · Get plenty of rest    Understand a fever  · Take your temperature several times a day. If your fever is 100.4°F (38.0°C) for more than a day, call your healthcare provider.  · Relax, lie down. Go to bed if you want. Just get off your feet and rest. Also, drink plenty of fluids to avoid dehydration.  · Take acetaminophen or a nonsteroidal anti-inflammatory agent (NSAID), such as ibuprofen.  Treat a troubled nose kindly  · Breathe steam or heated humidified air to open blocked nasal passages.  a hot shower or use a vaporizer. Be careful not to get burned by the steam.  · Saline nasal sprays and decongestant tablets help open a stuffy nose. Antihistamines can also help, but they can cause side effects such as drowsiness and drying of the eyes, nose, and mouth.  Soothe a sore throat and cough  · Gargle every 2 hours with 1/4 teaspoon of salt dissolved in 1/2 cup of warm water. Suck on throat lozenges and cough drops to moisten your throat.  · Cough medicines are available but it is unclear how well they actually work.  · Take acetaminophen or an NSAID, such as ibuprofen, to ease throat pain  Ease digestive problems  · Put fluids back into your body. Take frequent sips of clear liquids such as water or broth. Avoid drinks that have a lot of sugar in them, such as juices and sodas. These can make diarrhea worse. Older children and adults can drink sports drinks.  · As your appetite returns, you can resume your normal diet. Ask your healthcare provider if there are any foods you should avoid.  When to seek medical care  When you first notice  symptoms, ask your healthcare provider if antiviral medicines are appropriate. Antibiotics should not be taken for colds or flu. Also, call your healthcare provider if you have any of the following symptoms or if you aren't feeling better after 7 days:  · Shortness of breath  · Pain or pressure in the chest or belly (abdomen)  · Worsening symptoms, especially after a period of improvement  · Fever of 100.4°F  (38.0°C) or higher, or fever that doesn't go down with medicine  · Sudden dizziness or confusion  · Severe or continued vomiting  · Signs of dehydration, including extreme thirst, dark urine, infrequent urination, dry mouth  · Spotted, red, or very sore throat   Date Last Reviewed: 12/1/2016  © 3819-2702 [employer]. [address] [place] [address], [place] [address]. All rights reserved. This information is not intended as a substitute for professional medical care. Always follow your healthcare professional's instructions.

## 2017-12-22 NOTE — PROGRESS NOTES
"Subjective:      Patient ID: Fiorella Connelly is a 89 y.o. female.    Chief Complaint: Nasal Congestion    URI    This is a recurrent problem. The current episode started more than 1 month ago (about 6 weeks). The problem has been waxing and waning. There has been no fever. Associated symptoms include congestion, coughing, rhinorrhea and a sore throat. Pertinent negatives include no chest pain, diarrhea, dysuria, ear pain, headaches, nausea, plugged ear sensation, rash, sneezing or wheezing. She has tried antihistamine (steroids, antibiotic, singulair) for the symptoms. The treatment provided mild relief.   Patient reports she is now able to cough up sputum and is not having the congestion as before.  She voices her symptoms began to improve starting yesterday.    Review of Systems   Constitutional: Negative for chills, fatigue and fever.   HENT: Positive for congestion, rhinorrhea and sore throat. Negative for ear pain and sneezing.    Respiratory: Positive for cough. Negative for shortness of breath and wheezing.    Cardiovascular: Negative for chest pain, palpitations and leg swelling.   Gastrointestinal: Negative for diarrhea and nausea.   Genitourinary: Negative for dysuria.   Skin: Negative for rash and wound.   Neurological: Negative for weakness, numbness and headaches.   Psychiatric/Behavioral: The patient is not nervous/anxious.        Objective:     BP (!) 123/58   Pulse 61   Temp 98.3 °F (36.8 °C) (Oral)   Ht 5' 2" (1.575 m)   Wt 74.5 kg (164 lb 3.9 oz)   BMI 30.04 kg/m²     Physical Exam   Constitutional: She is oriented to person, place, and time. She appears well-developed and well-nourished.   HENT:   Head: Normocephalic.   Right Ear: Tympanic membrane, external ear and ear canal normal.   Left Ear: Tympanic membrane, external ear and ear canal normal.   Nose: Mucosal edema and rhinorrhea (nasal mucosa erythematous and boggy) present. Right sinus exhibits frontal sinus tenderness. Right sinus " exhibits no maxillary sinus tenderness. Left sinus exhibits frontal sinus tenderness. Left sinus exhibits no maxillary sinus tenderness.   Mouth/Throat: Posterior oropharyngeal erythema (mild; clear post nasal drainage noted) present. No oropharyngeal exudate or posterior oropharyngeal edema.   Eyes: Pupils are equal, round, and reactive to light.   Cardiovascular: Normal rate and regular rhythm.    Murmur heard.   Systolic murmur is present   Pulmonary/Chest: Effort normal and breath sounds normal. No respiratory distress. She has no decreased breath sounds. She has no wheezes. She has no rhonchi. She has no rales.   Lymphadenopathy:     She has no cervical adenopathy.   Neurological: She is alert and oriented to person, place, and time.   Skin: Skin is warm and dry. No rash noted.   Psychiatric: She has a normal mood and affect. Her behavior is normal. Judgment and thought content normal.   Vitals reviewed.    Assessment:     1. Viral URI with cough        Plan:     Problem List Items Addressed This Visit     None      Visit Diagnoses     Viral URI with cough    -  Primary      Symptomatic care discussed and educational handout provided  Continue medications as prescribed  Report to ER if symptoms worsen    Return if symptoms worsen or fail to improve.

## 2017-12-28 ENCOUNTER — OFFICE VISIT (OUTPATIENT)
Dept: FAMILY MEDICINE | Facility: CLINIC | Age: 82
End: 2017-12-28
Payer: MEDICARE

## 2017-12-28 ENCOUNTER — TELEPHONE (OUTPATIENT)
Dept: FAMILY MEDICINE | Facility: CLINIC | Age: 82
End: 2017-12-28

## 2017-12-28 VITALS
DIASTOLIC BLOOD PRESSURE: 66 MMHG | WEIGHT: 168 LBS | HEART RATE: 64 BPM | SYSTOLIC BLOOD PRESSURE: 128 MMHG | TEMPERATURE: 98 F | BODY MASS INDEX: 30.91 KG/M2 | HEIGHT: 62 IN

## 2017-12-28 DIAGNOSIS — J32.9 SINUSITIS, UNSPECIFIED CHRONICITY, UNSPECIFIED LOCATION: Primary | ICD-10-CM

## 2017-12-28 PROCEDURE — 99214 OFFICE O/P EST MOD 30 MIN: CPT | Mod: PBBFAC,PO,25 | Performed by: FAMILY MEDICINE

## 2017-12-28 PROCEDURE — 96372 THER/PROPH/DIAG INJ SC/IM: CPT | Mod: PBBFAC,PO

## 2017-12-28 PROCEDURE — 99213 OFFICE O/P EST LOW 20 MIN: CPT | Mod: S$PBB,,, | Performed by: FAMILY MEDICINE

## 2017-12-28 PROCEDURE — 99999 PR PBB SHADOW E&M-EST. PATIENT-LVL IV: CPT | Mod: PBBFAC,,, | Performed by: FAMILY MEDICINE

## 2017-12-28 RX ORDER — DEXAMETHASONE SODIUM PHOSPHATE 4 MG/ML
8 INJECTION, SOLUTION INTRA-ARTICULAR; INTRALESIONAL; INTRAMUSCULAR; INTRAVENOUS; SOFT TISSUE ONCE
Status: COMPLETED | OUTPATIENT
Start: 2017-12-28 | End: 2017-12-28

## 2017-12-28 RX ORDER — DOXYCYCLINE 100 MG/1
100 CAPSULE ORAL EVERY 12 HOURS
Qty: 28 CAPSULE | Refills: 1 | Status: SHIPPED | OUTPATIENT
Start: 2017-12-28 | End: 2018-01-18

## 2017-12-28 RX ORDER — CODEINE PHOSPHATE AND GUAIFENESIN 10; 100 MG/5ML; MG/5ML
5 SOLUTION ORAL EVERY 6 HOURS PRN
Qty: 240 ML | Refills: 0 | Status: SHIPPED | OUTPATIENT
Start: 2017-12-28 | End: 2018-01-07

## 2017-12-28 RX ADMIN — DEXAMETHASONE SODIUM PHOSPHATE 8 MG: 4 INJECTION, SOLUTION INTRAMUSCULAR; INTRAVENOUS at 05:12

## 2017-12-28 NOTE — PROGRESS NOTES
Fiorella Connelly presents with moderate upper respiratory congestion,rhinnorhea,moderate cough past 2-3 days. OTC help slightly. Denies nausea,vomiting,diarrhea or significant fever.    Past Medical History:   Diagnosis Date    Acute pancreatitis     told chronic pancreatitis prior to diagnosis of gallstone and cholecystectomy    Hypertension     Polymyalgia rheumatica     Rheumatoid arthritis      Past Surgical History:   Procedure Laterality Date    APPENDECTOMY      CARPAL TUNNEL RELEASE Bilateral     CHOLECYSTECTOMY      COLONOSCOPY  ~2010    normal findings per patient report    EYE SURGERY      HIP FRACTURE SURGERY  09/2017    HYSTERECTOMY      ovaries remain    UPPER GASTROINTESTINAL ENDOSCOPY  ~2010    with dilation     Review of patient's allergies indicates:   Allergen Reactions    Xanax [alprazolam] Swelling    Cetirizine Other (See Comments)     hallucinations    Nsaids (non-steroidal anti-inflammatory drug)     Shellfish containing products Swelling    Sulfa (sulfonamide antibiotics) Nausea And Vomiting    Chlorhexidine Rash     Current Outpatient Prescriptions on File Prior to Visit   Medication Sig Dispense Refill    acetaminophen (TYLENOL) 500 MG tablet Take 500 mg by mouth every 6 (six) hours as needed for Pain.      amlodipine (NORVASC) 5 MG tablet Take 1 tablet (5 mg total) by mouth once daily. 30 tablet 12    CALCIUM CARBONATE/VITAMIN D3 (CALCIUM 600 + D,3, ORAL) Take by mouth.      cloNIDine (CATAPRES) 0.2 MG tablet TAKE 1 TABLET BY MOUTH EVERY DAY 90 tablet 12    cloNIDine (CATAPRES) 0.2 MG tablet TAKE 1 TABLET BY MOUTH EVERY DAY 90 tablet 5    folic acid (FOLVITE) 1 MG tablet TAKE 1 TABLET BY MOUTH DAILY. 30 tablet 5    gabapentin (NEURONTIN) 300 MG capsule TAKE ONE CAPSULE BY MOUTH EVERY MORNING, THEN 2 CAPSULE EVERY EVENING 270 capsule 4    irbesartan-hydrochlorothiazide (AVALIDE) 300-12.5 mg per tablet Take 1 tablet by mouth once daily. 90 tablet 3    loperamide  (IMODIUM A-D) 2 mg Tab Take 2 mg by mouth 4 (four) times daily as needed.      loratadine (CLARITIN) 10 mg tablet TAKE 1 TABLET BY MOUTH EVERY DAY 90 tablet 3    LORazepam (ATIVAN) 1 MG tablet TAKE 1 TABLET BY MOUTH TWICE A DAY 60 tablet 5    methotrexate 2.5 MG Tab TAKE 8 TABLETS BY MOUTH EVERY 7 DAYS. 40 tablet 12    metoprolol succinate (TOPROL-XL) 50 MG 24 hr tablet Take 1 tablet (50 mg total) by mouth once daily. 30 tablet 12    montelukast (SINGULAIR) 10 mg tablet Take 10 mg by mouth.      omeprazole (PRILOSEC) 20 MG capsule TAKE 1 CAPSULE BY MOUTH TWICE DAILY 180 capsule 3    predniSONE (DELTASONE) 20 MG tablet TAKE 1 TABLET BY MOUTH TWICE A DAY (Patient taking differently: pt taking once daily 20mg) 60 tablet 6    tramadol (ULTRAM) 50 mg tablet TAKE 2 TABLETS BY MOUTH FOUR TIMES DAILY 240 tablet 3    aspirin 81 MG Chew Take 81 mg by mouth once daily.      FOLIC ACID/MULTIVIT-MIN/LUTEIN (CENTRUM SILVER ORAL) Take by mouth.       No current facility-administered medications on file prior to visit.      Social History     Social History    Marital status:      Spouse name: N/A    Number of children: N/A    Years of education: N/A     Occupational History    Not on file.     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  EYES: Visual acuity fine. No photophobia, ocular pain or diplopia.EARS:  Denies ear pain, discharge or vertigo.NOSE: No loss of smell, no epistaxis some postnasal drip.MOUTH & THROAT: No hoarseness or change in voice. No excessive gum bleeding.CHEST: Denies DURAN, cyanosis, wheezing  CARDIOVASCULAR: Denies chest pain, PND, orthopnea or reduced exercise tolerance.  ABDOMEN:  No weight loss.No abdominal pain, no hematemesis or blood in stool.  URINARY: No flank pain, dysuria or hematuria.  PERIPHERAL VASCULAR: No claudication or cyanosis.  MUSCULOSKELETAL: Negative   NEUROLOGIC: No history of seizures, paralysis, alteration of gait or coordination.    PE: Vital signs as noted  Heent:Normocephalic with no recent cranial trauma,PERRLA,EOMI,conjunctiva clear,fundi reveal no hemmorhage exudate or papilledema.Otic canals clear, tympanic membranes slightly dull bilaterally.Nasal mucosa slightly red and edematous.Posterior pharynx slightly red but without exudate.  Neck:Supple with minimal anterior cervical adenopathy.  Chest:Clear bilateral breath sounds with mild scattered ronchi  Heart:Regular rhthym without murmer  Abdomen:Soft, non tender,no masses, no hepatosplenomegalyExtremeties and Neurologic:Grossly within normal limits  Impression: Upper Respiratory Infection. 465.9  Plan: Sinusitis   Doxy 100

## 2017-12-28 NOTE — TELEPHONE ENCOUNTER
----- Message from Toshia Wheeler sent at 12/28/2017 10:20 AM CST -----  Contact: Maria Antonia - daughter  She would like for you to call her regarding the patient because she been sick since before Thanksgiving.   She is congested, coughing and really weak.  She could hardly get up this morning.   She would like for you to call her at [phone].                                            dunbar

## 2018-01-01 ENCOUNTER — OFFICE VISIT (OUTPATIENT)
Dept: OTOLARYNGOLOGY | Facility: CLINIC | Age: 83
End: 2018-01-01
Payer: MEDICARE

## 2018-01-01 ENCOUNTER — TELEPHONE (OUTPATIENT)
Dept: FAMILY MEDICINE | Facility: CLINIC | Age: 83
End: 2018-01-01

## 2018-01-01 ENCOUNTER — OFFICE VISIT (OUTPATIENT)
Dept: FAMILY MEDICINE | Facility: CLINIC | Age: 83
End: 2018-01-01
Payer: MEDICARE

## 2018-01-01 ENCOUNTER — LAB VISIT (OUTPATIENT)
Dept: LAB | Facility: HOSPITAL | Age: 83
End: 2018-01-01
Attending: NURSE PRACTITIONER
Payer: MEDICARE

## 2018-01-01 ENCOUNTER — TELEPHONE (OUTPATIENT)
Dept: ADMINISTRATIVE | Facility: CLINIC | Age: 83
End: 2018-01-01

## 2018-01-01 VITALS
HEART RATE: 64 BPM | BODY MASS INDEX: 29.08 KG/M2 | SYSTOLIC BLOOD PRESSURE: 120 MMHG | WEIGHT: 158 LBS | HEIGHT: 62 IN | DIASTOLIC BLOOD PRESSURE: 70 MMHG | TEMPERATURE: 98 F

## 2018-01-01 VITALS
HEIGHT: 62 IN | TEMPERATURE: 98 F | SYSTOLIC BLOOD PRESSURE: 100 MMHG | BODY MASS INDEX: 26.96 KG/M2 | DIASTOLIC BLOOD PRESSURE: 60 MMHG | HEART RATE: 77 BPM

## 2018-01-01 VITALS
HEART RATE: 70 BPM | BODY MASS INDEX: 27.12 KG/M2 | HEIGHT: 62 IN | DIASTOLIC BLOOD PRESSURE: 57 MMHG | SYSTOLIC BLOOD PRESSURE: 94 MMHG | TEMPERATURE: 97 F | WEIGHT: 147.38 LBS

## 2018-01-01 VITALS — WEIGHT: 158.75 LBS | HEIGHT: 62 IN | BODY MASS INDEX: 29.21 KG/M2

## 2018-01-01 DIAGNOSIS — R19.7 DIARRHEA, UNSPECIFIED TYPE: ICD-10-CM

## 2018-01-01 DIAGNOSIS — M06.9 RHEUMATOID ARTHRITIS, INVOLVING UNSPECIFIED SITE, UNSPECIFIED RHEUMATOID FACTOR PRESENCE: ICD-10-CM

## 2018-01-01 DIAGNOSIS — L03.114 CELLULITIS OF LEFT ELBOW: ICD-10-CM

## 2018-01-01 DIAGNOSIS — F41.9 ANXIETY: ICD-10-CM

## 2018-01-01 DIAGNOSIS — M06.9 RHEUMATOID ARTHRITIS INVOLVING MULTIPLE SITES, UNSPECIFIED RHEUMATOID FACTOR PRESENCE: Primary | ICD-10-CM

## 2018-01-01 DIAGNOSIS — L03.114 CELLULITIS OF LEFT ELBOW: Primary | ICD-10-CM

## 2018-01-01 DIAGNOSIS — B00.9 HERPETIC LESIONS OF FACE: Primary | ICD-10-CM

## 2018-01-01 DIAGNOSIS — K12.0 ULCER APHTHOUS ORAL: Primary | ICD-10-CM

## 2018-01-01 DIAGNOSIS — R10.84 GENERALIZED ABDOMINAL PAIN: ICD-10-CM

## 2018-01-01 LAB
BASOPHILS # BLD AUTO: 0.03 K/UL
BASOPHILS NFR BLD: 0.3 %
DIFFERENTIAL METHOD: ABNORMAL
EOSINOPHIL # BLD AUTO: 0 K/UL
EOSINOPHIL NFR BLD: 0 %
ERYTHROCYTE [DISTWIDTH] IN BLOOD BY AUTOMATED COUNT: 16.7 %
HCT VFR BLD AUTO: 38.3 %
HGB BLD-MCNC: 12.1 G/DL
IMM GRANULOCYTES # BLD AUTO: 0.24 K/UL
IMM GRANULOCYTES NFR BLD AUTO: 2 %
LYMPHOCYTES # BLD AUTO: 1 K/UL
LYMPHOCYTES NFR BLD: 8.3 %
MCH RBC QN AUTO: 31.9 PG
MCHC RBC AUTO-ENTMCNC: 31.6 G/DL
MCV RBC AUTO: 101 FL
MONOCYTES # BLD AUTO: 0.6 K/UL
MONOCYTES NFR BLD: 4.9 %
NEUTROPHILS # BLD AUTO: 10.1 K/UL
NEUTROPHILS NFR BLD: 84.5 %
NRBC BLD-RTO: 0 /100 WBC
PLATELET # BLD AUTO: 383 K/UL
PMV BLD AUTO: 9.3 FL
RBC # BLD AUTO: 3.79 M/UL
WBC # BLD AUTO: 11.89 K/UL

## 2018-01-01 PROCEDURE — 99999 PR PBB SHADOW E&M-EST. PATIENT-LVL III: CPT | Mod: PBBFAC,,, | Performed by: NURSE PRACTITIONER

## 2018-01-01 PROCEDURE — 99212 OFFICE O/P EST SF 10 MIN: CPT | Mod: PBBFAC,PO | Performed by: OTOLARYNGOLOGY

## 2018-01-01 PROCEDURE — 99213 OFFICE O/P EST LOW 20 MIN: CPT | Mod: S$PBB,,, | Performed by: OTOLARYNGOLOGY

## 2018-01-01 PROCEDURE — 99213 OFFICE O/P EST LOW 20 MIN: CPT | Mod: S$PBB,,, | Performed by: NURSE PRACTITIONER

## 2018-01-01 PROCEDURE — 85025 COMPLETE CBC W/AUTO DIFF WBC: CPT

## 2018-01-01 PROCEDURE — 99213 OFFICE O/P EST LOW 20 MIN: CPT | Mod: PBBFAC,PO | Performed by: NURSE PRACTITIONER

## 2018-01-01 PROCEDURE — 99999 PR PBB SHADOW E&M-EST. PATIENT-LVL II: CPT | Mod: PBBFAC,,, | Performed by: OTOLARYNGOLOGY

## 2018-01-01 PROCEDURE — 99495 TRANSJ CARE MGMT MOD F2F 14D: CPT | Mod: PBBFAC,PO | Performed by: FAMILY MEDICINE

## 2018-01-01 PROCEDURE — 99214 OFFICE O/P EST MOD 30 MIN: CPT | Mod: S$PBB,,, | Performed by: FAMILY MEDICINE

## 2018-01-01 PROCEDURE — 99999 PR PBB SHADOW E&M-EST. PATIENT-LVL IV: CPT | Mod: PBBFAC,,, | Performed by: FAMILY MEDICINE

## 2018-01-01 PROCEDURE — 99214 OFFICE O/P EST MOD 30 MIN: CPT | Mod: PBBFAC,PO | Performed by: FAMILY MEDICINE

## 2018-01-01 PROCEDURE — 36415 COLL VENOUS BLD VENIPUNCTURE: CPT | Mod: PO

## 2018-01-01 RX ORDER — IRBESARTAN AND HYDROCHLOROTHIAZIDE 300; 12.5 MG/1; MG/1
1 TABLET, FILM COATED ORAL DAILY
Qty: 90 TABLET | Refills: 3 | Status: SHIPPED | OUTPATIENT
Start: 2018-01-01 | End: 2019-01-01

## 2018-01-01 RX ORDER — CLINDAMYCIN HYDROCHLORIDE 300 MG/1
300 CAPSULE ORAL EVERY 8 HOURS
Qty: 30 CAPSULE | Refills: 0 | Status: ON HOLD | OUTPATIENT
Start: 2018-01-01 | End: 2018-01-01 | Stop reason: HOSPADM

## 2018-01-01 RX ORDER — CLONIDINE HYDROCHLORIDE 0.2 MG/1
TABLET ORAL
Qty: 90 TABLET | Refills: 4 | Status: SHIPPED | OUTPATIENT
Start: 2018-01-01 | End: 2019-01-01 | Stop reason: SDUPTHER

## 2018-01-01 RX ORDER — METHOTREXATE 2.5 MG/1
TABLET ORAL
Qty: 120 TABLET | Refills: 3 | Status: SHIPPED | OUTPATIENT
Start: 2018-01-01 | End: 2019-01-01 | Stop reason: SDUPTHER

## 2018-01-01 RX ORDER — TRAMADOL HYDROCHLORIDE 50 MG/1
TABLET ORAL
Qty: 240 TABLET | Refills: 2 | Status: ON HOLD | OUTPATIENT
Start: 2018-01-01 | End: 2018-01-01 | Stop reason: SDUPTHER

## 2018-01-01 RX ORDER — NYSTATIN 100000 [USP'U]/ML
4 SUSPENSION ORAL 4 TIMES DAILY
Qty: 160 ML | Refills: 0 | Status: SHIPPED | OUTPATIENT
Start: 2018-01-01 | End: 2018-01-01

## 2018-01-01 RX ORDER — LORAZEPAM 1 MG/1
TABLET ORAL
Qty: 60 TABLET | Refills: 5 | Status: ON HOLD | OUTPATIENT
Start: 2018-01-01 | End: 2018-01-01 | Stop reason: SDUPTHER

## 2018-01-18 ENCOUNTER — TELEPHONE (OUTPATIENT)
Dept: FAMILY MEDICINE | Facility: CLINIC | Age: 83
End: 2018-01-18

## 2018-01-18 ENCOUNTER — OFFICE VISIT (OUTPATIENT)
Dept: FAMILY MEDICINE | Facility: CLINIC | Age: 83
End: 2018-01-18
Payer: MEDICARE

## 2018-01-18 ENCOUNTER — HOSPITAL ENCOUNTER (OUTPATIENT)
Dept: RADIOLOGY | Facility: HOSPITAL | Age: 83
Discharge: HOME | End: 2018-01-18
Attending: NURSE PRACTITIONER
Payer: MEDICARE

## 2018-01-18 VITALS
DIASTOLIC BLOOD PRESSURE: 64 MMHG | SYSTOLIC BLOOD PRESSURE: 139 MMHG | HEART RATE: 59 BPM | BODY MASS INDEX: 30.73 KG/M2 | WEIGHT: 167 LBS | TEMPERATURE: 98 F | HEIGHT: 62 IN

## 2018-01-18 DIAGNOSIS — R53.1 WEAKNESS: ICD-10-CM

## 2018-01-18 DIAGNOSIS — J34.89 NASAL LESION: ICD-10-CM

## 2018-01-18 DIAGNOSIS — R05.9 COUGH: ICD-10-CM

## 2018-01-18 DIAGNOSIS — R53.83 FATIGUE, UNSPECIFIED TYPE: ICD-10-CM

## 2018-01-18 DIAGNOSIS — R05.9 COUGH: Primary | ICD-10-CM

## 2018-01-18 DIAGNOSIS — E61.1 IRON DEFICIENCY: ICD-10-CM

## 2018-01-18 PROCEDURE — 93005 ELECTROCARDIOGRAM TRACING: CPT | Mod: PBBFAC,PO | Performed by: NURSE PRACTITIONER

## 2018-01-18 PROCEDURE — 71046 X-RAY EXAM CHEST 2 VIEWS: CPT | Mod: 26,,, | Performed by: RADIOLOGY

## 2018-01-18 PROCEDURE — 99213 OFFICE O/P EST LOW 20 MIN: CPT | Mod: S$PBB,,, | Performed by: NURSE PRACTITIONER

## 2018-01-18 PROCEDURE — 71046 X-RAY EXAM CHEST 2 VIEWS: CPT | Mod: TC,PO

## 2018-01-18 PROCEDURE — 99215 OFFICE O/P EST HI 40 MIN: CPT | Mod: PBBFAC,PO | Performed by: NURSE PRACTITIONER

## 2018-01-18 PROCEDURE — 93010 ELECTROCARDIOGRAM REPORT: CPT | Mod: ,,, | Performed by: INTERNAL MEDICINE

## 2018-01-18 PROCEDURE — 99999 PR PBB SHADOW E&M-EST. PATIENT-LVL V: CPT | Mod: PBBFAC,,, | Performed by: NURSE PRACTITIONER

## 2018-01-18 RX ORDER — MUPIROCIN 20 MG/G
OINTMENT TOPICAL 3 TIMES DAILY
Qty: 1 TUBE | Refills: 0 | Status: SHIPPED | OUTPATIENT
Start: 2018-01-18 | End: 2018-01-28

## 2018-01-18 RX ORDER — ALBUTEROL SULFATE 90 UG/1
2 AEROSOL, METERED RESPIRATORY (INHALATION) EVERY 6 HOURS PRN
Qty: 1 INHALER | Refills: 0 | Status: SHIPPED | OUTPATIENT
Start: 2018-01-18 | End: 2018-04-26

## 2018-01-18 NOTE — TELEPHONE ENCOUNTER
----- Message from Patel Green sent at 1/18/2018  3:59 PM CST -----  Contact: Jaclyn, pt's daughter  She's calling in regards to a missed call, please advise, [phone] (cell)

## 2018-01-18 NOTE — PROGRESS NOTES
Subjective:       Patient ID: Fiorella Connelly is a 89 y.o. female.    Chief Complaint: Cough; Chest Congestion; and Nasal Congestion    Cough   This is a recurrent problem. The current episode started more than 1 month ago. The problem has been waxing and waning. The problem occurs every few minutes. The cough is productive of sputum. Associated symptoms include nasal congestion and postnasal drip. Pertinent negatives include no chest pain, chills, ear congestion, ear pain, fever, headaches, heartburn, hemoptysis, myalgias, rash, rhinorrhea, sore throat, shortness of breath, sweats, weight loss or wheezing. Nothing aggravates the symptoms. She has tried oral steroids (abx, cough medication) for the symptoms. The treatment provided no relief. There is no history of asthma, bronchiectasis, bronchitis, COPD, emphysema, environmental allergies or pneumonia.   Fatigue   This is a recurrent problem. The current episode started more than 1 month ago (Worse since coughing started). The problem occurs daily. The problem has been unchanged. Associated symptoms include congestion, coughing, fatigue and weakness. Pertinent negatives include no abdominal pain, anorexia, arthralgias, change in bowel habit, chest pain, chills, diaphoresis, fever, headaches, joint swelling, myalgias, nausea, neck pain, numbness, rash, sore throat, swollen glands, urinary symptoms, vertigo, visual change or vomiting. Nothing aggravates the symptoms. She has tried nothing (States history of iron deficiency) for the symptoms. The treatment provided no relief.   Pt also c/o nasal irritation to left nostril x 1-2 w; states has been using vaseline; some improvement. Pt has no other complaints today.  Past Medical History:   Diagnosis Date    Acute pancreatitis     told chronic pancreatitis prior to diagnosis of gallstone and cholecystectomy    Hypertension     Polymyalgia rheumatica     Rheumatoid arthritis      Social History     Social History     Marital status:      Spouse name: N/A    Number of children: N/A    Years of education: N/A     Occupational History    Not on file.     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 Negative for chills, diaphoresis, fever and weight loss.   HENT: Positive for congestion and postnasal drip. Negative for ear pain, rhinorrhea and sore throat.         Nasal irritation, left   Eyes: Negative.    Respiratory: Positive for cough. Negative for hemoptysis, shortness of breath and wheezing.    Cardiovascular: Negative.  Negative for chest pain.   Gastrointestinal: Negative.  Negative for abdominal pain, anorexia, change in bowel habit, heartburn, nausea and vomiting.   Endocrine: Negative.    Genitourinary: Negative.    Musculoskeletal: Negative.  Negative for arthralgias, joint swelling, myalgias and neck pain.   Skin: Negative.  Negative for rash.   Allergic/Immunologic: Negative.  Negative for environmental allergies.   Neurological: Positive for weakness. Negative for vertigo, numbness and headaches.   Psychiatric/Behavioral: Negative.        Objective:      Physical Exam   Constitutional: She is oriented to person, place, and time. She appears well-developed and well-nourished.   HENT:   Head: Normocephalic.       Right Ear: Hearing, tympanic membrane, external ear and ear canal normal.    Left Ear: Hearing, tympanic membrane, external ear and ear canal normal.   Nose: Rhinorrhea present. Right sinus exhibits no maxillary sinus tenderness and no frontal sinus tenderness. Left sinus exhibits no maxillary sinus tenderness and no frontal sinus tenderness.   Mouth/Throat: Uvula is midline, oropharynx is clear and moist and mucous membranes are normal.   Eyes: Conjunctivae are normal. Pupils are equal, round, and reactive to light.   Neck: Normal range of motion. Neck supple.   Cardiovascular: Normal rate, regular rhythm and normal heart sounds.    Pulmonary/Chest: Effort normal and breath sounds normal.   Abdominal: Soft. Bowel sounds are normal.   Musculoskeletal: Normal range of motion.   Neurological: She is alert and oriented to person, place, and time.   Skin: Skin is warm and dry. Capillary refill takes 2 to 3 seconds.   Psychiatric: She has a normal mood and affect. Her behavior is normal. Judgment and thought content normal.   Nursing note and vitals reviewed.      Assessment:       1. Cough    2. Weakness    3. Fatigue, unspecified type    4. Nasal lesion    5. Iron deficiency         Plan:           Fiorella was seen today for cough, chest congestion and nasal congestion.    Diagnoses and all orders for this visit:    Cough  -     X-Ray Chest PA And Lateral; Future  -     CBC auto differential; Future  -     D dimer, quantitative; Future  -     albuterol 90 mcg/actuation inhaler; Inhale 2 puffs into the lungs every 6 (six) hours as needed for Wheezing. Rescue    Weakness  Fatigue, unspecified type  -     CBC auto differential; Future  -     TSH; Future  -     Basic metabolic panel; Future  -     Iron and TIBC; Future  -     IN OFFICE EKG 12-LEAD (to [facility])    Nasal lesion  -     mupirocin (BACTROBAN) 2 % ointment; Apply topically 3 (three) times daily.    Iron deficiency   -     Iron and TIBC; Future    Report to ER immediately if symptoms worsen

## 2018-01-19 PROBLEM — K21.9 GERD (GASTROESOPHAGEAL REFLUX DISEASE): Status: ACTIVE | Noted: 2018-01-19

## 2018-01-19 PROBLEM — R05.3 CHRONIC COUGH: Status: ACTIVE | Noted: 2018-01-19

## 2018-01-19 PROBLEM — R30.0 DYSURIA: Status: ACTIVE | Noted: 2018-01-19

## 2018-01-19 PROBLEM — J18.9 COMMUNITY ACQUIRED PNEUMONIA: Status: ACTIVE | Noted: 2018-01-19

## 2018-01-22 ENCOUNTER — TELEPHONE (OUTPATIENT)
Dept: FAMILY MEDICINE | Facility: CLINIC | Age: 83
End: 2018-01-22

## 2018-01-22 RX ORDER — TRAMADOL HYDROCHLORIDE 50 MG/1
TABLET ORAL
Qty: 240 TABLET | Refills: 0 | Status: SHIPPED | OUTPATIENT
Start: 2018-01-22 | End: 2018-02-22 | Stop reason: SDUPTHER

## 2018-01-22 NOTE — TELEPHONE ENCOUNTER
----- Message from Bridget Dodd sent at 1/22/2018 10:16 AM CST -----  Contact: Agustin/daughter [phone]  1. What is the name of the medication you are requesting? tramadol  2. What is the dose? 50mg  3. How do you take the medication? Orally, topically, etc? orally  4. How often do you take this medication? 2 tab 4x daily  5. Do you need a 30 day or 90 day supply? 30 day  6. How many refills are you requesting? 1  7. What is your preferred pharmacy and location of the pharmacy?     [facility] [id number] [facility] [place] - [address] [place] [address]  [address] [place] [address]  [place] [address]  Phone: [phone] Fax: [phone]    8. Who can we contact with further questions? Agustin/daughter [phone]

## 2018-01-22 NOTE — TELEPHONE ENCOUNTER
appt mailed with instructions that this is for medication refill and is needed before more refills can be given

## 2018-01-26 ENCOUNTER — OFFICE VISIT (OUTPATIENT)
Dept: FAMILY MEDICINE | Facility: CLINIC | Age: 83
End: 2018-01-26
Payer: MEDICARE

## 2018-01-26 VITALS — SYSTOLIC BLOOD PRESSURE: 152 MMHG | TEMPERATURE: 98 F | DIASTOLIC BLOOD PRESSURE: 63 MMHG | HEART RATE: 57 BPM

## 2018-01-26 DIAGNOSIS — M77.8 LEFT SHOULDER TENDONITIS: Primary | ICD-10-CM

## 2018-01-26 PROCEDURE — 99213 OFFICE O/P EST LOW 20 MIN: CPT | Mod: S$PBB,,, | Performed by: NURSE PRACTITIONER

## 2018-01-26 PROCEDURE — 99214 OFFICE O/P EST MOD 30 MIN: CPT | Mod: PBBFAC,PO,25 | Performed by: NURSE PRACTITIONER

## 2018-01-26 PROCEDURE — 99999 PR PBB SHADOW E&M-EST. PATIENT-LVL IV: CPT | Mod: PBBFAC,,, | Performed by: NURSE PRACTITIONER

## 2018-01-26 PROCEDURE — 96372 THER/PROPH/DIAG INJ SC/IM: CPT | Mod: PBBFAC,PO

## 2018-01-26 RX ORDER — CYCLOBENZAPRINE HCL 5 MG
5 TABLET ORAL 2 TIMES DAILY PRN
Qty: 20 TABLET | Refills: 0 | Status: SHIPPED | OUTPATIENT
Start: 2018-01-26 | End: 2018-02-05

## 2018-01-26 RX ORDER — KETOROLAC TROMETHAMINE 30 MG/ML
30 INJECTION, SOLUTION INTRAMUSCULAR; INTRAVENOUS
Status: COMPLETED | OUTPATIENT
Start: 2018-01-26 | End: 2018-01-26

## 2018-01-26 RX ADMIN — KETOROLAC TROMETHAMINE 30 MG: 30 INJECTION, SOLUTION INTRAMUSCULAR; INTRAVENOUS at 02:01

## 2018-01-26 NOTE — PROGRESS NOTES
Subjective:       Patient ID: Fiorella Connelly is a 89 y.o. female.    Chief Complaint: Shoulder Pain    Shoulder Pain    The pain is present in the left shoulder and left arm. This is a new problem. The current episode started in the past 7 days. The problem occurs constantly. The problem has been unchanged. The quality of the pain is described as aching. The pain is moderate. Associated symptoms include a limited range of motion and stiffness. Pertinent negatives include no fever or headaches.       Review of Systems   Constitutional: Negative for fatigue, fever and unexpected weight change.   HENT: Negative for ear pain and sore throat.    Eyes: Negative for pain and visual disturbance.   Respiratory: Negative for cough and shortness of breath.    Cardiovascular: Negative for chest pain and palpitations.   Gastrointestinal: Negative for abdominal pain, diarrhea and vomiting.   Musculoskeletal: Positive for arthralgias, myalgias and stiffness.   Skin: Negative for color change and rash.   Neurological: Negative for dizziness and headaches.   Psychiatric/Behavioral: Negative for dysphoric mood and sleep disturbance. The patient is not nervous/anxious.        Vitals:    01/26/18 1356   BP: (!) 152/63   Pulse: (!) 57   Temp: 98.3 °F (36.8 °C)       Objective:     Current Outpatient Prescriptions   Medication Sig Dispense Refill    acetaminophen (TYLENOL) 500 MG tablet Take 500 mg by mouth every 6 (six) hours as needed for Pain.      albuterol 90 mcg/actuation inhaler Inhale 2 puffs into the lungs every 6 (six) hours as needed for Wheezing. Rescue 1 Inhaler 0    amlodipine (NORVASC) 5 MG tablet Take 1 tablet (5 mg total) by mouth once daily. 30 tablet 12    aspirin 81 MG Chew Take 81 mg by mouth once daily.      CALCIUM CARBONATE/VITAMIN D3 (CALCIUM 600 + D,3, ORAL) Take by mouth.      cloNIDine (CATAPRES) 0.2 MG tablet TAKE 1 TABLET BY MOUTH EVERY DAY 90 tablet 12    cloNIDine (CATAPRES) 0.2 MG tablet TAKE 1  TABLET BY MOUTH EVERY DAY 90 tablet 5    folic acid (FOLVITE) 1 MG tablet TAKE 1 TABLET BY MOUTH DAILY. 30 tablet 5    FOLIC ACID/MULTIVIT-MIN/LUTEIN (CENTRUM SILVER ORAL) Take by mouth.      gabapentin (NEURONTIN) 300 MG capsule TAKE ONE CAPSULE BY MOUTH EVERY MORNING, THEN 2 CAPSULE EVERY EVENING 270 capsule 4    irbesartan-hydrochlorothiazide (AVALIDE) 300-12.5 mg per tablet Take 1 tablet by mouth once daily. 90 tablet 3    loperamide (IMODIUM A-D) 2 mg Tab Take 2 mg by mouth 4 (four) times daily as needed.      loratadine (CLARITIN) 10 mg tablet TAKE 1 TABLET BY MOUTH EVERY DAY 90 tablet 3    LORazepam (ATIVAN) 1 MG tablet TAKE 1 TABLET BY MOUTH TWICE A DAY 60 tablet 5    methotrexate 2.5 MG Tab TAKE 8 TABLETS BY MOUTH EVERY 7 DAYS. 40 tablet 12    metoprolol succinate (TOPROL-XL) 50 MG 24 hr tablet Take 1 tablet (50 mg total) by mouth once daily. 30 tablet 12    montelukast (SINGULAIR) 10 mg tablet Take 10 mg by mouth.      mupirocin (BACTROBAN) 2 % ointment Apply topically 3 (three) times daily. 1 Tube 0    omeprazole (PRILOSEC) 20 MG capsule TAKE 1 CAPSULE BY MOUTH TWICE DAILY 180 capsule 3    predniSONE (DELTASONE) 20 MG tablet TAKE 1 TABLET BY MOUTH TWICE A DAY (Patient taking differently: pt taking once daily 20mg) 60 tablet 6    traMADol (ULTRAM) 50 mg tablet TAKE 2 TABLETS BY MOUTH 4 TIMES DAILY 240 tablet 0    cyclobenzaprine (FLEXERIL) 5 MG tablet Take 1 tablet (5 mg total) by mouth 2 (two) times daily as needed (muscle pain). 20 tablet 0     No current facility-administered medications for this visit.        Physical Exam   Constitutional: She is oriented to person, place, and time. She appears well-developed and well-nourished. No distress.   HENT:   Head: Normocephalic and atraumatic.   Eyes: EOM are normal. Pupils are equal, round, and reactive to light.   Neck: Normal range of motion. Neck supple.   Cardiovascular: Normal rate and regular rhythm.    Pulmonary/Chest: Effort normal  and breath sounds normal.   Musculoskeletal:        Left shoulder: She exhibits decreased range of motion, tenderness and decreased strength. She exhibits no swelling and normal pulse.   Neurological: She is alert and oriented to person, place, and time.   Skin: Skin is warm and dry. No rash noted.   Psychiatric: She has a normal mood and affect. Judgment normal.   Nursing note and vitals reviewed.      Assessment:       1. Left shoulder tendonitis        Plan:   Left shoulder tendonitis  -     ketorolac injection 30 mg; Inject 30 mg into the muscle one time.    Other orders  -     cyclobenzaprine (FLEXERIL) 5 MG tablet; Take 1 tablet (5 mg total) by mouth 2 (two) times daily as needed (muscle pain).  Dispense: 20 tablet; Refill: 0    heat to the area    Follow-up if symptoms worsen or fail to improve.

## 2018-02-22 ENCOUNTER — OFFICE VISIT (OUTPATIENT)
Dept: FAMILY MEDICINE | Facility: CLINIC | Age: 83
End: 2018-02-22
Payer: MEDICARE

## 2018-02-22 VITALS
DIASTOLIC BLOOD PRESSURE: 69 MMHG | HEART RATE: 68 BPM | HEIGHT: 62 IN | WEIGHT: 168 LBS | SYSTOLIC BLOOD PRESSURE: 139 MMHG | TEMPERATURE: 98 F | BODY MASS INDEX: 30.91 KG/M2

## 2018-02-22 DIAGNOSIS — M54.42 CHRONIC BILATERAL LOW BACK PAIN WITH BILATERAL SCIATICA: ICD-10-CM

## 2018-02-22 DIAGNOSIS — I10 BENIGN ESSENTIAL HTN: ICD-10-CM

## 2018-02-22 DIAGNOSIS — G89.29 CHRONIC BILATERAL LOW BACK PAIN WITH BILATERAL SCIATICA: ICD-10-CM

## 2018-02-22 DIAGNOSIS — M06.9 RHEUMATOID ARTHRITIS, INVOLVING UNSPECIFIED SITE, UNSPECIFIED RHEUMATOID FACTOR PRESENCE: Primary | ICD-10-CM

## 2018-02-22 DIAGNOSIS — M54.41 CHRONIC BILATERAL LOW BACK PAIN WITH BILATERAL SCIATICA: ICD-10-CM

## 2018-02-22 DIAGNOSIS — L01.00 IMPETIGO: ICD-10-CM

## 2018-02-22 PROCEDURE — 99212 OFFICE O/P EST SF 10 MIN: CPT | Mod: PBBFAC,PO | Performed by: FAMILY MEDICINE

## 2018-02-22 PROCEDURE — 99999 PR PBB SHADOW E&M-EST. PATIENT-LVL II: CPT | Mod: PBBFAC,,, | Performed by: FAMILY MEDICINE

## 2018-02-22 PROCEDURE — 99213 OFFICE O/P EST LOW 20 MIN: CPT | Mod: S$PBB,,, | Performed by: FAMILY MEDICINE

## 2018-02-22 PROCEDURE — 1159F MED LIST DOCD IN RCRD: CPT | Mod: ,,, | Performed by: FAMILY MEDICINE

## 2018-02-22 PROCEDURE — 1125F AMNT PAIN NOTED PAIN PRSNT: CPT | Mod: ,,, | Performed by: FAMILY MEDICINE

## 2018-02-22 RX ORDER — TRAMADOL HYDROCHLORIDE 50 MG/1
TABLET ORAL
Qty: 240 TABLET | Refills: 2 | Status: SHIPPED | OUTPATIENT
Start: 2018-02-22 | End: 2018-05-21 | Stop reason: SDUPTHER

## 2018-02-22 RX ORDER — DOXYCYCLINE 100 MG/1
100 CAPSULE ORAL EVERY 12 HOURS
Qty: 20 CAPSULE | Refills: 1 | Status: SHIPPED | OUTPATIENT
Start: 2018-02-22 | End: 2018-04-26

## 2018-02-22 NOTE — PROGRESS NOTES
The patient presents today to fu LBP radiating both legs to knees. Also  PMR pain decr rom rt shoulder   HBP has been stable.  Has chronic anxiety   Hx esophageal strictures 2nd to GERD--GERD sig worse p 2 w. Currently on omeprazole.     Past Medical History:  Past Medical History:   Diagnosis Date    Acute pancreatitis     told chronic pancreatitis prior to diagnosis of gallstone and cholecystectomy    Hypertension     Polymyalgia rheumatica     Rheumatoid arthritis      Past Surgical History:   Procedure Laterality Date    APPENDECTOMY      CARPAL TUNNEL RELEASE Bilateral     CHOLECYSTECTOMY      COLONOSCOPY  ~2010    normal findings per patient report    EYE SURGERY      HIP FRACTURE SURGERY  09/2017    HYSTERECTOMY      ovaries remain    UPPER GASTROINTESTINAL ENDOSCOPY  ~2010    with dilation     Review of patient's allergies indicates:   Allergen Reactions    Xanax [alprazolam] Swelling    Shellfish containing products Swelling    Sulfa (sulfonamide antibiotics) Nausea And Vomiting     Current Outpatient Prescriptions on File Prior to Visit   Medication Sig Dispense Refill    acetaminophen (TYLENOL) 500 MG tablet Take 500 mg by mouth every 6 (six) hours as needed for Pain.      albuterol 90 mcg/actuation inhaler Inhale 2 puffs into the lungs every 6 (six) hours as needed for Wheezing. Rescue 1 Inhaler 0    amlodipine (NORVASC) 5 MG tablet Take 1 tablet (5 mg total) by mouth once daily. 30 tablet 12    aspirin 81 MG Chew Take 81 mg by mouth once daily.      CALCIUM CARBONATE/VITAMIN D3 (CALCIUM 600 + D,3, ORAL) Take by mouth.      cloNIDine (CATAPRES) 0.2 MG tablet TAKE 1 TABLET BY MOUTH EVERY DAY 90 tablet 12    cloNIDine (CATAPRES) 0.2 MG tablet TAKE 1 TABLET BY MOUTH EVERY DAY 90 tablet 5    folic acid (FOLVITE) 1 MG tablet TAKE 1 TABLET BY MOUTH DAILY. 30 tablet 5    FOLIC ACID/MULTIVIT-MIN/LUTEIN (CENTRUM SILVER ORAL) Take by mouth.      gabapentin (NEURONTIN) 300 MG capsule  TAKE ONE CAPSULE BY MOUTH EVERY MORNING, THEN 2 CAPSULE EVERY EVENING 270 capsule 4    irbesartan-hydrochlorothiazide (AVALIDE) 300-12.5 mg per tablet Take 1 tablet by mouth once daily. 90 tablet 3    loperamide (IMODIUM A-D) 2 mg Tab Take 2 mg by mouth 4 (four) times daily as needed.      loratadine (CLARITIN) 10 mg tablet TAKE 1 TABLET BY MOUTH EVERY DAY 90 tablet 3    LORazepam (ATIVAN) 1 MG tablet TAKE 1 TABLET BY MOUTH TWICE A DAY 60 tablet 5    methotrexate 2.5 MG Tab TAKE 8 TABLETS BY MOUTH EVERY 7 DAYS. 40 tablet 12    metoprolol succinate (TOPROL-XL) 50 MG 24 hr tablet Take 1 tablet (50 mg total) by mouth once daily. 30 tablet 12    montelukast (SINGULAIR) 10 mg tablet Take 10 mg by mouth.      omeprazole (PRILOSEC) 20 MG capsule TAKE 1 CAPSULE BY MOUTH TWICE DAILY 180 capsule 3    predniSONE (DELTASONE) 20 MG tablet TAKE 1 TABLET BY MOUTH TWICE A DAY (Patient taking differently: pt taking once daily 20mg) 60 tablet 6    traMADol (ULTRAM) 50 mg tablet TAKE 2 TABLETS BY MOUTH 4 TIMES DAILY 240 tablet 0     No current facility-administered medications on file prior to visit.      Social History     Social History    Marital status:      Spouse name: N/A    Number of children: N/A    Years of education: N/A     Occupational History    Not on file.     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  SKIN: No rashes, itching or changes in color or texture of skin.  HEAD: No headaches or recent head trauma.EYES: Visual acuity fine. No photophobia, ocular pain or diplopia.EARS: Denies ear pain, discharge or vertigo.NOSE: No loss of smell, no epistaxis or postnasal drip.MOUTH & THROAT: No hoarseness or change in voice. No excessive gum bleeding.NODES: Denies swollen glands.  CHEST: Denies DURAN, cyanosis, wheezing, cough and sputum production.  CARDIOVASCULAR: Denies chest pain, PND, orthopnea or reduced exercise tolerance.  ABDOMEN: Appetite fine. No weight loss. Denies diarrhea, abdominal pain, hematemesis or blood in stool.  URINARY: No flank pain, dysuria or hematuria.  PERIPHERAL VASCULAR: No claudication or cyanosis.  MUSCULOSKELETAL: See above.  NEUROLOGIC: No history of seizures, paralysis, alteration of gait or coordination.  PE:   HEAD: Normocephalic, atraumatic.EYES: PERRL. EOMI.   EARS: TM's intact. Light reflex normal. No retraction or perforation.   NOSE: Mucosa pink. Airway clear.MOUTH & THROAT: No tonsillar enlargement. No pharyngeal erythema or exudate. No stridor.  NODES: No cervical, axillary or inguinal lymph node enlargement.  CHEST: Lungs clear to auscultation.  CARDIOVASCULAR: Normal S1, S2. No rubs, murmurs or gallops.  ABDOMEN: Bowel sounds normal. Not distended. Soft. No tenderness or masses.  MUSCULOSKELETAL: Hand deformities and gen arthralgia. Rt post sghoulder tender rom limited tender swollen;tender paralumbar and rt SI     NEUROLOGIC: Cranial Nerves: II-XII grossly intact.  Motor: 5/5 strength major flexors/extensors.  DTR's: Knees, Ankles 2+ and equal bilaterally; downgoing toes.  Sensory: Intact to light touch distally.  Gait & Posture: Normal gait and fine motion. No cerebellar signs.     Impression:Sacroiliitis  LBP  GERD c stricture   Radiculopathy  RA  Rt knee arthralgia   PMR  HBP  Anxiety  Impetigo   Plan:Lab eval rev   Rec diet and ex recs  Rev sig side effects current  meds but has been well controlled on current regimen for years and will maintain for now

## 2018-03-06 ENCOUNTER — OFFICE VISIT (OUTPATIENT)
Dept: OTOLARYNGOLOGY | Facility: CLINIC | Age: 83
End: 2018-03-06
Payer: MEDICARE

## 2018-03-06 VITALS
BODY MASS INDEX: 30.22 KG/M2 | DIASTOLIC BLOOD PRESSURE: 58 MMHG | HEIGHT: 62 IN | WEIGHT: 164.25 LBS | SYSTOLIC BLOOD PRESSURE: 162 MMHG

## 2018-03-06 DIAGNOSIS — J34.89 NASAL VESTIBULITIS: Primary | ICD-10-CM

## 2018-03-06 PROCEDURE — 99203 OFFICE O/P NEW LOW 30 MIN: CPT | Mod: S$PBB,,, | Performed by: OTOLARYNGOLOGY

## 2018-03-06 PROCEDURE — 99213 OFFICE O/P EST LOW 20 MIN: CPT | Mod: PBBFAC,PO | Performed by: OTOLARYNGOLOGY

## 2018-03-06 PROCEDURE — 87070 CULTURE OTHR SPECIMN AEROBIC: CPT

## 2018-03-06 PROCEDURE — 99999 PR PBB SHADOW E&M-EST. PATIENT-LVL III: CPT | Mod: PBBFAC,,, | Performed by: OTOLARYNGOLOGY

## 2018-03-06 NOTE — LETTER
March 6, 2018      Sirisha Pritchard, PT  [address] [facility] [address]  [address]  [place] [address]           [facility]  [address] [facility] [address] [place] [address]  Phone: [phone]  Fax: [phone]          Patient: Fiorella Connelly   MR Number: [id number]   YOB: 1928   Date of Visit: 3/6/2018       Dear Sirisha Prithcard:    Thank you for referring Fiorella Connelly to me for evaluation. Attached you will find relevant portions of my assessment and plan of care.    If you have questions, please do not hesitate to call me. I look forward to following Fiorella Connelly along with you.    Sincerely,    Fady Nolasco MD    Enclosure  CC:  No Recipients    If you would like to receive this communication electronically, please contact [email or web address] or [phone] to request more information on [facility] access.    For providers and/or their staff who would like to refer a patient to [facility], please contact us through our one-stop-shop provider referral line, [facility], at [phone].    If you feel you have received this communication in error or would no longer like to receive these types of communications, please e-mail [email or web address]

## 2018-03-06 NOTE — PATIENT INSTRUCTIONS
Aquaphor in the left nostril twice daily    Stop Mupirocin, stop hydrogen peroxide for now    I will call with culture results.

## 2018-03-06 NOTE — PROGRESS NOTES
Subjective:       Patient ID: Fiorella Connelly is a 89 y.o. female.    Chief Complaint: sore in nostril    Fiorella is here for suspected nasal infection. Symptoms have been present for 2 months. This is only occurring on the left side. She has never had a sore like this before. She has been using Bactroban for 2 months on and off. She denies any trauma, but it may have been related to blowing her nose during a recent URI.   She has been given Doxycycline and has had recent admission for other issues and has been on IV abx with minimal improvement.. She denies picking at it.   She has soreness of the area but is a little better.  She has been using H2O2 and ointment most recently.  She takes MTX and Prednisone.   She had a recent viral illness.  She had a fever blister recently.   She has RA and PMR managed by Dr. Hoover    Meds: ASA, Prednisone chronically    History   Smoking Status    Never Smoker   Smokeless Tobacco    Never Used     History   Alcohol Use No          Review of Systems   Constitutional: Negative for activity change and appetite change.   Eyes: Negative for discharge.   Respiratory: Negative for difficulty breathing and wheezing   Cardiovascular: Negative for chest pain.   Gastrointestinal: Negative for abdominal distention and abdominal pain.   Endocrine: Negative for cold intolerance and heat intolerance.   Genitourinary: Negative for dysuria.   Musculoskeletal: Negative for gait problem and joint swelling.   Skin: Negative for color change and pallor.   Neurological: Negative for syncope and weakness.   Psychiatric/Behavioral: Negative for agitation and confusion.     Objective:        Constitutional:   She is oriented to person, place, and time. She appears well-developed and well-nourished. She appears alert. She is active. Normal speech.      Head:  Normocephalic and atraumatic. Head is without TMJ tenderness. No scars. Salivary glands normal.  Facial strength is normal.      Ears:    Right Ear:  No drainage or swelling. No middle ear effusion.   Left Ear: No drainage or swelling.  No middle ear effusion.     Nose:  No mucosal edema, rhinorrhea, sinus tenderness or septal deviation. No turbinate hypertrophy.    Left Nasal crusting and superficial ulceration near vestibule at nasal septum and extending to mucosa just along the medial crura dome. Crust removed on one aspect to reveal a superficial ulcer. No active bleeding. Cultured. No other nasal sores.    Mouth/Throat  Oropharynx clear and moist without lesions or asymmetry, normal uvula midline and mirror exam normal. Normal dentition. No uvula swelling, lacerations or trismus. No oropharyngeal exudate. Tonsillar erythema, tonsillar exudate.    Small crusting fever blister upper lip just above vermilion       Neck:  Full range of motion with neck supple and no adenopathy. Thyroid tenderness is present. No tracheal deviation, no edema, no erythema, normal range of motion, no stridor, no crepitus and no neck rigidity present. No thyroid mass present.     Cardiovascular:   Intact distal pulses and normal pulses.      Pulmonary/Chest:   Effort normal and breath sounds normal. No stridor.     Psychiatric:   Her speech is normal and behavior is normal. Her mood appears not anxious. Her affect is not labile.     Neurological:   She is alert and oriented to person, place, and time. No sensory deficit.     Skin:   No abrasions, lacerations, lesions, or rashes. No abrasion and no bruising noted.         Tests / Results:  None    Assessment:       1. Nasal vestibulitis          Plan:         Culture taken - would have expected improvement by now if strictly bacterial in nature. Possibly related to drying out by H2O2 and Mupirocin by now. Recommend aggressive moisture therapy with Aquaphor, d/c H2O2/Bactroban. If persistent, will consider topical steroid cream given history of AI disease.   Fu 2 weeks

## 2018-03-08 LAB — BACTERIA SPEC AEROBE CULT: NORMAL

## 2018-03-21 ENCOUNTER — OFFICE VISIT (OUTPATIENT)
Dept: OTOLARYNGOLOGY | Facility: CLINIC | Age: 83
End: 2018-03-21
Payer: MEDICARE

## 2018-03-21 VITALS
HEIGHT: 62 IN | BODY MASS INDEX: 30.18 KG/M2 | DIASTOLIC BLOOD PRESSURE: 60 MMHG | WEIGHT: 164 LBS | SYSTOLIC BLOOD PRESSURE: 128 MMHG

## 2018-03-21 DIAGNOSIS — J34.89 NASAL VESTIBULITIS: Primary | ICD-10-CM

## 2018-03-21 PROCEDURE — 99214 OFFICE O/P EST MOD 30 MIN: CPT | Mod: S$PBB,,, | Performed by: OTOLARYNGOLOGY

## 2018-03-21 PROCEDURE — 99213 OFFICE O/P EST LOW 20 MIN: CPT | Mod: PBBFAC,PO | Performed by: OTOLARYNGOLOGY

## 2018-03-21 PROCEDURE — 99999 PR PBB SHADOW E&M-EST. PATIENT-LVL III: CPT | Mod: PBBFAC,,, | Performed by: OTOLARYNGOLOGY

## 2018-03-21 RX ORDER — ACYCLOVIR 50 MG/G
CREAM TOPICAL 3 TIMES DAILY
Qty: 5 G | Refills: 1 | Status: SHIPPED | OUTPATIENT
Start: 2018-03-21 | End: 2018-03-23

## 2018-03-21 NOTE — PROGRESS NOTES
Subjective:       Patient ID: Fiorella Connelly is a 89 y.o. female.    Chief Complaint: Follow-up (sore in nose, sore on lip)    Fiorella is here for follow-up of sore on lip and nasal sore. It has had some improvement but lesion is still present. Still sore to touch.   She has RA .     She did have a nasal supratip lesion that was biopsied a long time ago, not healing. Negative for malignancy.    Review of Systems   Constitutional: Negative for activity change and appetite change.   Respiratory: Negative for difficulty breathing and wheezing   Cardiovascular: Negative for chest pain.      Objective:          Nose:    Thick dark crusting has improved. There is now honey crusted scaling with surrounding erythematous base and TTP along left medial crura to dome and anterior caudal septum. Scant purulence/nasal drainage.  Right nostril normal    Mouth/Throat    Crusting cold sore upper lip on left          Tests / Results:  None    Assessment:       1. Nasal vestibulitis          Plan:       Unclear what is causing her nasal vestibulitis. Non-responsive to multiple topical and oral antibiotics. Will trial Zovirax TID  Will call in 2 weeks  Consider Valisone if not improved.

## 2018-03-22 ENCOUNTER — TELEPHONE (OUTPATIENT)
Dept: OTOLARYNGOLOGY | Facility: CLINIC | Age: 83
End: 2018-03-22

## 2018-03-22 NOTE — TELEPHONE ENCOUNTER
----- Message from Jacqueline Felix sent at 3/22/2018 11:45 AM CDT -----  Contact: Daughter  Jamila Perry (daughter) [phone], Calling about the medication order yesterday 3/21/18 for the patient. The patient cost is over $600.. Also, note the generic is not much cheaper per the pharmacy. Is there anything else that would be covered by Medicare? Please advise. Thanks,.

## 2018-03-23 ENCOUNTER — TELEPHONE (OUTPATIENT)
Dept: OTOLARYNGOLOGY | Facility: CLINIC | Age: 83
End: 2018-03-23

## 2018-03-23 RX ORDER — ACYCLOVIR 400 MG/1
400 TABLET ORAL 3 TIMES DAILY
Qty: 21 TABLET | Refills: 0 | Status: SHIPPED | OUTPATIENT
Start: 2018-03-23 | End: 2018-04-26

## 2018-03-23 NOTE — TELEPHONE ENCOUNTER
----- Message from Leola Gregory sent at 3/23/2018  8:35 AM CDT -----  Contact: daughter, Jamila Perry   Patient's daughter, Jamila Perry called asking for advice about medication.  Please call patient's daughter at [phone]. Thanks!

## 2018-03-23 NOTE — TELEPHONE ENCOUNTER
"Spoke with daughter that states the pts insurance will not cover the Zovirax cream and the cost is almost $600. Pt is requesting a different rx to be called in. Daughter also states that the pts bottom lip is swollen and very puffy. Daughter states "Mom is concerned that she has cancer and it is spreading rapidly" I informed daughter that I would send this information to Dr. Nolasco and get back with her with his response and recommendations. Daughter verbalized understanding.  "

## 2018-03-23 NOTE — TELEPHONE ENCOUNTER
Left message on daughters voicemail that an oral medication has been sent to pharmacy. Advised to contact office next week to update on condition/symtoms.

## 2018-03-27 ENCOUNTER — TELEPHONE (OUTPATIENT)
Dept: OTOLARYNGOLOGY | Facility: CLINIC | Age: 83
End: 2018-03-27

## 2018-03-27 NOTE — TELEPHONE ENCOUNTER
----- Message from Leola Gregory sent at 3/27/2018  9:15 AM CDT -----  Contact: Lien with [facility]  Lien with [facility] pharmacy [phone] called regardingacyclovir (ZOVIRAX) 400 MG tablet for above patient. They are requesting one of the following: prior authorization. Thanks!

## 2018-03-29 ENCOUNTER — TELEPHONE (OUTPATIENT)
Dept: OTOLARYNGOLOGY | Facility: CLINIC | Age: 83
End: 2018-03-29

## 2018-03-29 NOTE — TELEPHONE ENCOUNTER
----- Message from Joslyn Peralta sent at 3/29/2018  8:53 AM CDT -----  Contact: Jamila Perry (Daughter)  Jamila Perry (Daughter) calling states would like the office to give her a call back so she can let them know how the pt is doing,was supposed to on yesterday and she forget,pt not doing much better....[phone]

## 2018-04-03 NOTE — TELEPHONE ENCOUNTER
----- Message from Jodi Abebe sent at 4/3/2018 10:14 AM CDT -----  Contact: Maria Antonia, daughter  Patient nose is not getting better, please advise. Call back number [phone]

## 2018-04-06 RX ORDER — OMEPRAZOLE 20 MG/1
CAPSULE, DELAYED RELEASE ORAL
Qty: 180 CAPSULE | Refills: 3 | Status: SHIPPED | OUTPATIENT
Start: 2018-04-06 | End: 2019-01-01 | Stop reason: SDUPTHER

## 2018-04-09 ENCOUNTER — OFFICE VISIT (OUTPATIENT)
Dept: OTOLARYNGOLOGY | Facility: CLINIC | Age: 83
End: 2018-04-09
Payer: MEDICARE

## 2018-04-09 VITALS — HEIGHT: 62 IN | WEIGHT: 166.88 LBS | BODY MASS INDEX: 30.71 KG/M2

## 2018-04-09 DIAGNOSIS — J34.89 NASAL VESTIBULITIS: Primary | ICD-10-CM

## 2018-04-09 PROCEDURE — 99999 PR PBB SHADOW E&M-EST. PATIENT-LVL III: CPT | Mod: PBBFAC,,, | Performed by: OTOLARYNGOLOGY

## 2018-04-09 PROCEDURE — 99212 OFFICE O/P EST SF 10 MIN: CPT | Mod: S$PBB,,, | Performed by: OTOLARYNGOLOGY

## 2018-04-09 PROCEDURE — 99213 OFFICE O/P EST LOW 20 MIN: CPT | Mod: PBBFAC,PO | Performed by: OTOLARYNGOLOGY

## 2018-04-09 NOTE — PROGRESS NOTES
Subjective:       Patient ID: Fiorella Connelly is a 89 y.o. female.    Chief Complaint: possible biopsy-nasal    Fiorella is here for follow-up of non healing left nasal vestibular ulcer. She has failed multiple rounds of topical and oral antibiotics as well as local wound care. I also trialed an antiiviral because she had a similar crusting lesion (fever blister) on her left upper lip. She began to have improvement over the weekend and feels it is finally headed in the right direction. No more pain.     Review of Systems   Constitutional: Negative for activity change and appetite change.   Respiratory: Negative for difficulty breathing and wheezing   Cardiovascular: Negative for chest pain.      Objective:        Constitutional:   Vital signs are normal. She appears well-developed and well-nourished.     Head:  Normocephalic and atraumatic.     Ears:  Hearing normal to normal and whispered voice; external ear normal without scars, lesions, or masses; ear canal, tympanic membrane, and middle ear normal..     Nose:    Healing ulcer on left nasal vestibule, small amount of residual dry anterior septum with no active bleeding or ulceration.    Healing fever blister on left upper lip.         Tests / Results:  None    Assessment:       1. Nasal vestibulitis          Plan:         Suspect viral cause of ulcer that seems to have responded to oral acyclovir. Other possiblity would be Rheum issue but did not do steroids. Continue moisture therapy and she will let me know if things start heading in wrong direction again. Expect her to do well.  Fu prn

## 2018-04-19 RX ORDER — METOPROLOL SUCCINATE 50 MG/1
TABLET, EXTENDED RELEASE ORAL
Qty: 30 TABLET | Refills: 12 | Status: SHIPPED | OUTPATIENT
Start: 2018-04-19 | End: 2019-01-01 | Stop reason: SDUPTHER

## 2018-04-23 ENCOUNTER — TELEPHONE (OUTPATIENT)
Dept: FAMILY MEDICINE | Facility: CLINIC | Age: 83
End: 2018-04-23

## 2018-04-23 NOTE — TELEPHONE ENCOUNTER
Pharmacy never received escribed Toprol, called in to Kandy at [facility] for patient

## 2018-04-23 NOTE — TELEPHONE ENCOUNTER
----- Message from Tamika Lazar sent at 4/23/2018  3:43 PM CDT -----  Contact: patient  Calling concerning the status of her medications. Please call patient ASAP @ [phone] Thanks, alee      [facility] [place] [facility] [address] [place] [address]  [address] [place] [address]  [place] [address]  Phone: [phone] Fax: [phone]

## 2018-04-25 ENCOUNTER — TELEPHONE (OUTPATIENT)
Dept: GASTROENTEROLOGY | Facility: CLINIC | Age: 83
End: 2018-04-25

## 2018-04-25 NOTE — TELEPHONE ENCOUNTER
----- Message from Lupe Wilson sent at 4/25/2018  3:09 PM CDT -----  Contact: Agustin  Type: Needs Medical Advice    Who Called: Agustin - daughter  Symptoms (please be specific):  NA  How long has patient had these symptoms:  NATHAN  Pharmacy name and phone #:  NATHAN  Best Call Back Number: [phone]  Additional Information: Schedule her procedure had one scheduled in 2017 but had to cancel

## 2018-04-26 ENCOUNTER — TELEPHONE (OUTPATIENT)
Dept: UROLOGY | Facility: CLINIC | Age: 83
End: 2018-04-26

## 2018-04-26 ENCOUNTER — OFFICE VISIT (OUTPATIENT)
Dept: UROLOGY | Facility: CLINIC | Age: 83
End: 2018-04-26
Payer: MEDICARE

## 2018-04-26 VITALS
SYSTOLIC BLOOD PRESSURE: 140 MMHG | HEIGHT: 62 IN | WEIGHT: 165.81 LBS | DIASTOLIC BLOOD PRESSURE: 68 MMHG | BODY MASS INDEX: 30.51 KG/M2

## 2018-04-26 DIAGNOSIS — R31.9 URINARY TRACT INFECTION WITH HEMATURIA, SITE UNSPECIFIED: Primary | ICD-10-CM

## 2018-04-26 DIAGNOSIS — N39.0 URINARY TRACT INFECTION WITH HEMATURIA, SITE UNSPECIFIED: Primary | ICD-10-CM

## 2018-04-26 LAB
BILIRUB SERPL-MCNC: NORMAL MG/DL
BLOOD URINE, POC: NORMAL
COLOR, POC UA: YELLOW
GLUCOSE UR QL STRIP: NORMAL
KETONES UR QL STRIP: NORMAL
LEUKOCYTE ESTERASE URINE, POC: NORMAL
NITRITE, POC UA: POSITIVE
PH, POC UA: 6.5
PROTEIN, POC: 30
SPECIFIC GRAVITY, POC UA: 1.02
UROBILINOGEN, POC UA: 0.2

## 2018-04-26 PROCEDURE — 87077 CULTURE AEROBIC IDENTIFY: CPT

## 2018-04-26 PROCEDURE — 87186 SC STD MICRODIL/AGAR DIL: CPT

## 2018-04-26 PROCEDURE — 99204 OFFICE O/P NEW MOD 45 MIN: CPT | Mod: S$PBB,,, | Performed by: UROLOGY

## 2018-04-26 PROCEDURE — 81002 URINALYSIS NONAUTO W/O SCOPE: CPT | Mod: PBBFAC,PO | Performed by: UROLOGY

## 2018-04-26 PROCEDURE — 87086 URINE CULTURE/COLONY COUNT: CPT

## 2018-04-26 PROCEDURE — 99213 OFFICE O/P EST LOW 20 MIN: CPT | Mod: PBBFAC,PO | Performed by: UROLOGY

## 2018-04-26 PROCEDURE — 99999 PR PBB SHADOW E&M-EST. PATIENT-LVL III: CPT | Mod: PBBFAC,,, | Performed by: UROLOGY

## 2018-04-26 PROCEDURE — 87088 URINE BACTERIA CULTURE: CPT

## 2018-04-26 RX ORDER — CIPROFLOXACIN 500 MG/1
500 TABLET ORAL 2 TIMES DAILY
Qty: 20 TABLET | Refills: 0 | Status: SHIPPED | OUTPATIENT
Start: 2018-04-26 | End: 2018-05-06

## 2018-04-26 NOTE — PROGRESS NOTES
Time: 4:45 pm    Order reviewed and verified Yes    Verified correct patient using two patient identifiers:: Yes    Allergies verified: Yes    Hand hygiene performed Yes    Name of 2nd person for insertion if applicable Sirisha Marino    Insertion attempts (insert comment if >2 attempts): 1    Catheter type: straight    Tube size: 14 fr    Catheter inserted using sterile technique: Yes    Urine returned:Yesclear    Urine specimen collected: Yes    Specimen labeled and verified in front of patient Yes    Total urine volume obtained:90 ml    Patient reports pain level as 0    Dr. Garibay notified

## 2018-04-26 NOTE — LETTER
April 26, 2018      William Domingo Jr., MD  [address] [facility] [address] [place] [address]           [facility]  [address] [facility] [address] [place] [address]  Phone: [phone]          Patient: Fiorella Connelly   MR Number: [id number]   YOB: 1928   Date of Visit: 4/26/2018       Dear Dr. William Domingo Jr.:    Thank you for referring Fiorella Connelly to me for evaluation. Attached you will find relevant portions of my assessment and plan of care.    If you have questions, please do not hesitate to call me. I look forward to following Fiorella Connelly along with you.    Sincerely,    BIB Garibay MD    Enclosure  CC:  No Recipients    If you would like to receive this communication electronically, please contact [email or web address] or [phone] to request more information on [facility] access.    For providers and/or their staff who would like to refer a patient to [facility], please contact us through our one-stop-shop provider referral line, [facility], at [phone].    If you feel you have received this communication in error or would no longer like to receive these types of communications, please e-mail [email or web address]

## 2018-04-26 NOTE — TELEPHONE ENCOUNTER
----- Message from Dalila Smith sent at 4/26/2018  3:11 PM CDT -----  Contact: Daughter,Agustin  Patient will be 10 to 15 mins late for appointment today 18 torrez turned over on [place] any questions please call back at [phone] (home)

## 2018-04-26 NOTE — PROGRESS NOTES
Subjective:       Patient ID: Fiorella Connelly is a 89 y.o. female.    Chief Complaint: Dysuria    HPI     89 year old with urinary frequency and urgency and urgency incontinence.  She wears pads and soaks through.  Symptoms seemed to start acutely after hip surgery 9-10 months ago.  She also has painful urination and she is passing mucus and air in her urine intermittently.  It is unclear if she has been treated for a UTI.  No urine cultures were obtained.  She denies gross hematuria but has noticed dark brown sediment in her urine.  She has frequent diarrhea.  She has been diagnosed with IBS but no previous diverticulitis.  No recent abdominal surgery.  She has a family history of colon cancer and is scheduled for colonoscopy next week.    Cath UA :  80 ml  Urine dipstick shows positive for 1+blood, positive for protein, positive for nitrates and positive for 3+leukocytes.  Micro exam: tntc WBC's per HPF, 10-15 RBC's per HPF and 3+ bacteria.       Past Medical History:   Diagnosis Date    Acute pancreatitis     told chronic pancreatitis prior to diagnosis of gallstone and cholecystectomy    Hypertension     Polymyalgia rheumatica     Rheumatoid arthritis      Past Surgical History:   Procedure Laterality Date    APPENDECTOMY      CARPAL TUNNEL RELEASE Bilateral     CHOLECYSTECTOMY      COLONOSCOPY  ~2010    normal findings per patient report    EYE SURGERY      HIP FRACTURE SURGERY  09/2017    HYSTERECTOMY      ovaries remain    UPPER GASTROINTESTINAL ENDOSCOPY  ~2010    with dilation       Current Outpatient Prescriptions:     acetaminophen (TYLENOL) 500 MG tablet, Take 500 mg by mouth every 6 (six) hours as needed for Pain., Disp: , Rfl:     amlodipine (NORVASC) 5 MG tablet, Take 1 tablet (5 mg total) by mouth once daily., Disp: 30 tablet, Rfl: 12    aspirin 81 MG Chew, Take 81 mg by mouth once daily., Disp: , Rfl:     CALCIUM CARBONATE/VITAMIN D3 (CALCIUM 600 + D,3, ORAL), Take by mouth., Disp: ,  Rfl:     cloNIDine (CATAPRES) 0.2 MG tablet, TAKE 1 TABLET BY MOUTH EVERY DAY, Disp: 90 tablet, Rfl: 12    folic acid (FOLVITE) 1 MG tablet, TAKE 1 TABLET BY MOUTH DAILY., Disp: 30 tablet, Rfl: 5    FOLIC ACID/MULTIVIT-MIN/LUTEIN (CENTRUM SILVER ORAL), Take by mouth., Disp: , Rfl:     gabapentin (NEURONTIN) 300 MG capsule, TAKE ONE CAPSULE BY MOUTH EVERY MORNING, THEN 2 CAPSULE EVERY EVENING, Disp: 270 capsule, Rfl: 4    irbesartan-hydrochlorothiazide (AVALIDE) 300-12.5 mg per tablet, Take 1 tablet by mouth once daily., Disp: 90 tablet, Rfl: 3    loperamide (IMODIUM A-D) 2 mg Tab, Take 2 mg by mouth 4 (four) times daily as needed., Disp: , Rfl:     loratadine (CLARITIN) 10 mg tablet, TAKE 1 TABLET BY MOUTH EVERY DAY, Disp: 90 tablet, Rfl: 3    LORazepam (ATIVAN) 1 MG tablet, TAKE 1 TABLET BY MOUTH TWICE A DAY, Disp: 60 tablet, Rfl: 5    methotrexate 2.5 MG Tab, TAKE 8 TABLETS BY MOUTH EVERY 7 DAYS., Disp: 40 tablet, Rfl: 12    metoprolol succinate (TOPROL-XL) 50 MG 24 hr tablet, TAKE 1 TABLET BY MOUTH DAILY., Disp: 30 tablet, Rfl: 12    montelukast (SINGULAIR) 10 mg tablet, Take 10 mg by mouth., Disp: , Rfl:     omeprazole (PRILOSEC) 20 MG capsule, TAKE 1 CAPSULE BY MOUTH TWICE DAILY, Disp: 180 capsule, Rfl: 3    predniSONE (DELTASONE) 20 MG tablet, TAKE 1 TABLET BY MOUTH TWICE A DAY (Patient taking differently: pt taking once daily 20mg), Disp: 60 tablet, Rfl: 6    traMADol (ULTRAM) 50 mg tablet, TAKE 2 TABLETS BY MOUTH 4 TIMES DAILY, Disp: 240 tablet, Rfl: 2    ciprofloxacin HCl (CIPRO) 500 MG tablet, Take 1 tablet (500 mg total) by mouth 2 (two) times daily., Disp: 20 tablet, Rfl: 0    Review of Systems   Constitutional: Negative for fever.   Eyes: Negative for visual disturbance.   Respiratory: Negative for shortness of breath.    Cardiovascular: Negative for chest pain.   Gastrointestinal: Negative for abdominal pain and nausea.   Genitourinary: Positive for dysuria. Negative for flank pain and  hematuria.   Musculoskeletal: Negative for gait problem.   Skin: Negative for rash.   Neurological: Negative for seizures.   Psychiatric/Behavioral: Negative for confusion.       Objective:      Physical Exam   Constitutional: She is oriented to person, place, and time. She appears well-developed and well-nourished.   HENT:   Head: Normocephalic.   Eyes: Conjunctivae and EOM are normal.   Neck: Normal range of motion.   Cardiovascular: Normal rate.    Pulmonary/Chest: Effort normal.   Abdominal: Soft. She exhibits no distension and no mass. There is no tenderness.   Genitourinary:   Genitourinary Comments: Bladder non-tender and nondistended  No CVA tenderness   Musculoskeletal: She exhibits no edema.   Neurological: She is alert and oriented to person, place, and time.   Skin: Skin is warm and dry. No rash noted. No erythema.   Psychiatric: She has a normal mood and affect. Her behavior is normal.   Vitals reviewed.      Assessment:       1. Urinary tract infection with hematuria, site unspecified        Plan:       Urinary tract infection with hematuria, site unspecified  -     POCT URINE DIPSTICK WITHOUT MICROSCOPE  -     Urine culture; Future; Expected date: 04/26/2018    Other orders  -     ciprofloxacin HCl (CIPRO) 500 MG tablet; Take 1 tablet (500 mg total) by mouth 2 (two) times daily.  Dispense: 20 tablet; Refill: 0      Has a UTI.   Possibly untreated for 9 months?  ?Gas forming organism.  Empiric Cipro and f/u urine culture.  Keep appt for colonoscopy.  ? CV fistula.

## 2018-04-26 NOTE — TELEPHONE ENCOUNTER
Spoke to pt's daughter  EGD/Colon scheduled for 4/30/18    Daughter states mother has been complaining of burning and painful urination on and off since she had hip replacement surgery.  PCP said next step would be urology     appt made for today with urology

## 2018-04-26 NOTE — PROGRESS NOTES
Subjective:       Patient ID: Fiorella Connelly is a 89 y.o. female.    Chief Complaint: Dysuria    HPI         Review of Systems    Objective:      Physical Exam    Assessment:       No diagnosis found.    Plan:       There are no diagnoses linked to this encounter.

## 2018-04-27 ENCOUNTER — TELEPHONE (OUTPATIENT)
Dept: GASTROENTEROLOGY | Facility: CLINIC | Age: 83
End: 2018-04-27

## 2018-04-27 NOTE — TELEPHONE ENCOUNTER
"Spoke to pt's daughter  Instruction e-mailed again     Daughter did R/C them but pt very concerned about doing the "gallon jug"    Reviewed chart kidney functions normal.  No history of congestive heart failure or kidney issues    Magnesium Citrate instructions e-mailed but STRONGLY emphasized that pt has to drink as least the requested amount of clear liquids after each bottle  "

## 2018-04-27 NOTE — TELEPHONE ENCOUNTER
----- Message from Jaja Luaren sent at 4/27/2018  9:23 AM CDT -----  Maria Antonia Perry [phone] / has not received the instructions for the procedure Monday

## 2018-04-30 ENCOUNTER — ANESTHESIA EVENT (OUTPATIENT)
Dept: ENDOSCOPY | Facility: HOSPITAL | Age: 83
End: 2018-04-30
Payer: MEDICARE

## 2018-04-30 ENCOUNTER — ANESTHESIA (OUTPATIENT)
Dept: ENDOSCOPY | Facility: HOSPITAL | Age: 83
End: 2018-04-30
Payer: MEDICARE

## 2018-04-30 ENCOUNTER — SURGERY (OUTPATIENT)
Age: 83
End: 2018-04-30

## 2018-04-30 ENCOUNTER — HOSPITAL ENCOUNTER (OUTPATIENT)
Facility: HOSPITAL | Age: 83
Discharge: HOME | End: 2018-04-30
Attending: INTERNAL MEDICINE | Admitting: INTERNAL MEDICINE
Payer: MEDICARE

## 2018-04-30 VITALS
TEMPERATURE: 98 F | HEART RATE: 84 BPM | BODY MASS INDEX: 30.91 KG/M2 | DIASTOLIC BLOOD PRESSURE: 71 MMHG | RESPIRATION RATE: 18 BRPM | WEIGHT: 168 LBS | OXYGEN SATURATION: 98 % | SYSTOLIC BLOOD PRESSURE: 170 MMHG | HEIGHT: 62 IN

## 2018-04-30 DIAGNOSIS — K21.9 GERD (GASTROESOPHAGEAL REFLUX DISEASE): ICD-10-CM

## 2018-04-30 LAB
BACTERIA UR CULT: NORMAL
H PYLORI INDEX VALUE: NEGATIVE

## 2018-04-30 PROCEDURE — 43251 EGD REMOVE LESION SNARE: CPT | Mod: PO | Performed by: INTERNAL MEDICINE

## 2018-04-30 PROCEDURE — 37000009 HC ANESTHESIA EA ADD 15 MINS: Mod: PO | Performed by: INTERNAL MEDICINE

## 2018-04-30 PROCEDURE — 88305 TISSUE EXAM BY PATHOLOGIST: CPT

## 2018-04-30 PROCEDURE — D9220A PRA ANESTHESIA: Mod: ANES,,, | Performed by: ANESTHESIOLOGY

## 2018-04-30 PROCEDURE — 63600175 PHARM REV CODE 636 W HCPCS: Mod: PO | Performed by: NURSE ANESTHETIST, CERTIFIED REGISTERED

## 2018-04-30 PROCEDURE — 43239 EGD BIOPSY SINGLE/MULTIPLE: CPT | Mod: PO | Performed by: INTERNAL MEDICINE

## 2018-04-30 PROCEDURE — 87449 NOS EACH ORGANISM AG IA: CPT | Mod: PO | Performed by: INTERNAL MEDICINE

## 2018-04-30 PROCEDURE — 63600175 PHARM REV CODE 636 W HCPCS: Mod: PO | Performed by: INTERNAL MEDICINE

## 2018-04-30 PROCEDURE — 45385 COLONOSCOPY W/LESION REMOVAL: CPT | Mod: PO | Performed by: INTERNAL MEDICINE

## 2018-04-30 PROCEDURE — 43248 EGD GUIDE WIRE INSERTION: CPT | Mod: PO | Performed by: INTERNAL MEDICINE

## 2018-04-30 PROCEDURE — 43239 EGD BIOPSY SINGLE/MULTIPLE: CPT | Mod: 59,,, | Performed by: INTERNAL MEDICINE

## 2018-04-30 PROCEDURE — 27201089 HC SNARE, DISP (ANY): Mod: PO | Performed by: INTERNAL MEDICINE

## 2018-04-30 PROCEDURE — 43248 EGD GUIDE WIRE INSERTION: CPT | Mod: 51,,, | Performed by: INTERNAL MEDICINE

## 2018-04-30 PROCEDURE — D9220A PRA ANESTHESIA: Mod: CRNA,,, | Performed by: NURSE ANESTHETIST, CERTIFIED REGISTERED

## 2018-04-30 PROCEDURE — 43251 EGD REMOVE LESION SNARE: CPT | Mod: 51,,, | Performed by: INTERNAL MEDICINE

## 2018-04-30 PROCEDURE — 45380 COLONOSCOPY AND BIOPSY: CPT | Mod: PO | Performed by: INTERNAL MEDICINE

## 2018-04-30 PROCEDURE — 27201042 HC RETRIEVAL NET: Mod: PO | Performed by: INTERNAL MEDICINE

## 2018-04-30 PROCEDURE — 37000008 HC ANESTHESIA 1ST 15 MINUTES: Mod: PO | Performed by: INTERNAL MEDICINE

## 2018-04-30 PROCEDURE — 27201012 HC FORCEPS, HOT/COLD, DISP: Mod: PO | Performed by: INTERNAL MEDICINE

## 2018-04-30 PROCEDURE — 45380 COLONOSCOPY AND BIOPSY: CPT | Mod: 59,,, | Performed by: INTERNAL MEDICINE

## 2018-04-30 PROCEDURE — 45385 COLONOSCOPY W/LESION REMOVAL: CPT | Mod: PT,,, | Performed by: INTERNAL MEDICINE

## 2018-04-30 PROCEDURE — 88305 TISSUE EXAM BY PATHOLOGIST: CPT | Mod: 26,,,

## 2018-04-30 RX ORDER — PROPOFOL 10 MG/ML
VIAL (ML) INTRAVENOUS
Status: DISCONTINUED | OUTPATIENT
Start: 2018-04-30 | End: 2018-04-30

## 2018-04-30 RX ORDER — CEFAZOLIN SODIUM 1 G/3ML
2 INJECTION, POWDER, FOR SOLUTION INTRAMUSCULAR; INTRAVENOUS ONCE
Status: COMPLETED | OUTPATIENT
Start: 2018-04-30 | End: 2018-04-30

## 2018-04-30 RX ORDER — PROPOFOL 10 MG/ML
VIAL (ML) INTRAVENOUS CONTINUOUS PRN
Status: DISCONTINUED | OUTPATIENT
Start: 2018-04-30 | End: 2018-04-30

## 2018-04-30 RX ORDER — LIDOCAINE HCL/PF 100 MG/5ML
SYRINGE (ML) INTRAVENOUS
Status: DISCONTINUED | OUTPATIENT
Start: 2018-04-30 | End: 2018-04-30

## 2018-04-30 RX ORDER — SODIUM CHLORIDE, SODIUM LACTATE, POTASSIUM CHLORIDE, CALCIUM CHLORIDE 600; 310; 30; 20 MG/100ML; MG/100ML; MG/100ML; MG/100ML
INJECTION, SOLUTION INTRAVENOUS CONTINUOUS
Status: DISCONTINUED | OUTPATIENT
Start: 2018-04-30 | End: 2018-04-30 | Stop reason: HOSPADM

## 2018-04-30 RX ADMIN — PROPOFOL 50 MG: 10 INJECTION, EMULSION INTRAVENOUS at 08:04

## 2018-04-30 RX ADMIN — PROPOFOL 25 MG: 10 INJECTION, EMULSION INTRAVENOUS at 08:04

## 2018-04-30 RX ADMIN — PROPOFOL 75 MG: 10 INJECTION, EMULSION INTRAVENOUS at 08:04

## 2018-04-30 RX ADMIN — PROPOFOL 75 MCG/KG/MIN: 10 INJECTION, EMULSION INTRAVENOUS at 08:04

## 2018-04-30 RX ADMIN — SODIUM CHLORIDE, SODIUM LACTATE, POTASSIUM CHLORIDE, CALCIUM CHLORIDE: 600; 310; 30; 20 INJECTION, SOLUTION INTRAVENOUS at 07:04

## 2018-04-30 RX ADMIN — CEFAZOLIN 2 G: 330 INJECTION, POWDER, FOR SOLUTION INTRAMUSCULAR; INTRAVENOUS at 08:04

## 2018-04-30 RX ADMIN — LIDOCAINE HYDROCHLORIDE 100 MG: 20 INJECTION, SOLUTION INTRAVENOUS at 08:04

## 2018-04-30 NOTE — PROVATION PATIENT INSTRUCTIONS
Discharge Summary/Instructions after an Endoscopic Procedure  Patient Name: Fiorella Connelly  Patient MRN: [id number]  Patient YOB: 1928 Monday, April 30, 2018  William Domingo MD  RESTRICTIONS:  During your procedure today, you received medications for sedation.  These   medications may affect your judgment, balance and coordination.  Therefore,   for 24 hours, you have the following restrictions:   - DO NOT drive a car, operate machinery, make legal/financial decisions,   sign important papers or drink alcohol.    ACTIVITY:  The following day: return to full activity including work, except no heavy   lifting, straining or running for 3 days if polyps were removed.  DIET:  Eat and drink normally unless instructed otherwise.     TREATMENT FOR COMMON SIDE EFFECTS:  - Mild abdominal pain, nausea, belching, bloating or excessive gas:  rest,   eat lightly and use a heating pad.  - Sore Throat: treat with throat lozenges and/or gargle with warm salt   water.  - Because air was used during the procedure, expelling large amounts of air   from your rectum or belching is normal.  - If a bowel prep was taken, you may not have a bowel movement for 1-3 days.    This is normal.  SYMPTOMS TO WATCH FOR AND REPORT TO YOUR PHYSICIAN:  1. Abdominal pain or bloating, other than gas cramps.  2. Chest pain.  3. Back pain.  4. Signs of infection such as: chills or fever occurring within 24 hours   after the procedure.  5. Rectal bleeding, which would show as bright red, maroon, or black stools.   (A tablespoon of blood from the rectum is not serious, especially if   hemorrhoids are present.)  6. Vomiting.  7. Weakness or dizziness.  GO DIRECTLY TO THE NEAREST EMERGENCY ROOM IF YOU HAVE ANY OF THE FOLLOWING:      Difficulty breathing              Chills and/or fever over 101 F   Persistent vomiting and/or vomiting blood   Severe abdominal pain   Severe chest pain   Black, tarry stools   Bleeding- more than one tablespoon   Any  other symptom or condition that you feel may need urgent attention  Your doctor recommends these additional instructions:  If any biopsies were taken, your doctors clinic will contact you in 1 to 2   weeks with any results.  - Perform a colonoscopy as previously scheduled.   - Discharge patient to home after that.   - Observe patient's clinical course following today's procedure with   therapeutic intervention.   - Await pathology and CLOtest results.   - Follow an antireflux regimen.   - Continue present medications.   - Use Prilosec (omeprazole) 20 mg PO daily.   - Repeat upper endoscopy PRN for retreatment.   - If no improvement, next perform a modified barium swallow.  For questions, problems or results please call your physician - William Domingo MD at Work:  [phone].  EMERGENCY PHONE NUMBER: [phone], LAB RESULTS: [phone]  IF A COMPLICATION OR EMERGENCY SITUATION ARISES AND YOU ARE UNABLE TO REACH   YOUR PHYSICIAN - GO DIRECTLY TO THE EMERGENCY ROOM.  ___________________________________________  Nurse Signature  ___________________________________________  Patient/Designated Responsible Party Signature  William Domingo MD  4/30/2018 9:39:31 AM  This report has been verified and signed electronically.

## 2018-04-30 NOTE — BRIEF OP NOTE
Discharge Note  Short Stay      SUMMARY     Admit Date: 4/30/2018    Attending Physician: William Domingo Jr., MD     Discharge Physician: William Domingo Jr., MD    Discharge Date: 4/30/2018 10:01 AM    Final Diagnosis: Dysphagia, unspecified type [R13.10]  Irregular bowel habits [R19.8]    Impression:          - Normal oropharynx.                       - Normal esophagus.                       - Z-line regular, 37 cm from the incisors.                       - Patulous lower esophageal sphincter.                       - Gastric mucosal atrophy. Biopsied.                       - A single gastric polyp. Resected and retrieved.                       - The examination was otherwise normal.                       - Normal examined duodenum.                       - No endoscopic esophageal abnormality to explain                        patient's dysphagia. Esophagus dilated, 51 Fr.  Recommendation:      - Perform a colonoscopy as previously scheduled.                       - Discharge patient to home after that.                       - Observe patient's clinical course following                        today's procedure with therapeutic intervention.                       - Await pathology and CLOtest results.                       - Follow an antireflux regimen.                       - Continue present medications.                       - Use Prilosec (omeprazole) 20 mg PO daily.                       - Repeat upper endoscopy PRN for retreatment.                       - If no improvement, next perform a modified barium swallow.    Impression:          - One 1 to 2 mm polyp at the hepatic flexure,                        removed with a cold snare. Resected and retrieved.                       - One 2 to 3 mm polyp in the distal transverse                        colon, removed with a cold snare. Resected and                        retrieved.                       - Diverticulosis in the sigmoid colon and in the                         descending colon.                       - Mild colonic spasm consistent with irritable bowel                        syndrome.                       - Non-bleeding internal hemorrhoids.                       - The examination was otherwise normal.                       - The examined portion of the ileum was normal.  Recommendation:      - Discharge patient to home.                       - Await pathology and culture results.                       - High fiber diet.                       - Use fiber, for example Citrucel, Fibercon, Konsyl                        or Metamucil.                       - Take a PROBIOTIC, such as ALIGN or CULTURELLE or                        LARRY-Q (all non-prescription), every day for a                        month.                       - Miralax 1 capful (17 grams) in 8 ounces of water                        PO daily PRN.                       - Imodium 2 caplets PO PRN after loose bowel                        movement.                       - Call the [facility] clinic in 2 weeks for reports (if                        you haven't heard from us sooner) [phone].                       - Repeat colonoscopy is not recommended.                       -                       - Continue present medications.    William Domingo MD  4/30/2018  Disposition: HOME OR SELF CARE    Patient Instructions:   Current Discharge Medication List      CONTINUE these medications which have NOT CHANGED    Details   acetaminophen (TYLENOL) 500 MG tablet Take 500 mg by mouth every 6 (six) hours as needed for Pain.      amlodipine (NORVASC) 5 MG tablet Take 1 tablet (5 mg total) by mouth once daily.  Qty: 30 tablet, Refills: 12      aspirin 81 MG Chew Take 81 mg by mouth once daily.      CALCIUM CARBONATE/VITAMIN D3 (CALCIUM 600 + D,3, ORAL) Take by mouth.      ciprofloxacin HCl (CIPRO) 500 MG tablet Take 1 tablet (500 mg total) by mouth 2 (two) times daily.  Qty: 20 tablet, Refills: 0      cloNIDine  (CATAPRES) 0.2 MG tablet TAKE 1 TABLET BY MOUTH EVERY DAY  Qty: 90 tablet, Refills: 12      folic acid (FOLVITE) 1 MG tablet TAKE 1 TABLET BY MOUTH DAILY.  Qty: 30 tablet, Refills: 5      FOLIC ACID/MULTIVIT-MIN/LUTEIN (CENTRUM SILVER ORAL) Take by mouth.      gabapentin (NEURONTIN) 300 MG capsule TAKE ONE CAPSULE BY MOUTH EVERY MORNING, THEN 2 CAPSULE EVERY EVENING  Qty: 270 capsule, Refills: 4      irbesartan-hydrochlorothiazide (AVALIDE) 300-12.5 mg per tablet Take 1 tablet by mouth once daily.  Qty: 90 tablet, Refills: 3      loperamide (IMODIUM A-D) 2 mg Tab Take 2 mg by mouth 4 (four) times daily as needed.      loratadine (CLARITIN) 10 mg tablet TAKE 1 TABLET BY MOUTH EVERY DAY  Qty: 90 tablet, Refills: 3      LORazepam (ATIVAN) 1 MG tablet TAKE 1 TABLET BY MOUTH TWICE A DAY  Qty: 60 tablet, Refills: 5    Comments: Not to exceed 5 additional fills before 03/27/2018.      methotrexate 2.5 MG Tab TAKE 8 TABLETS BY MOUTH EVERY 7 DAYS.  Qty: 40 tablet, Refills: 12      metoprolol succinate (TOPROL-XL) 50 MG 24 hr tablet TAKE 1 TABLET BY MOUTH DAILY.  Qty: 30 tablet, Refills: 12      montelukast (SINGULAIR) 10 mg tablet Take 10 mg by mouth.      omeprazole (PRILOSEC) 20 MG capsule TAKE 1 CAPSULE BY MOUTH TWICE DAILY  Qty: 180 capsule, Refills: 3    Comments: PATIENT REQUEST REFILL      predniSONE (DELTASONE) 20 MG tablet TAKE 1 TABLET BY MOUTH TWICE A DAY  Qty: 60 tablet, Refills: 6      traMADol (ULTRAM) 50 mg tablet TAKE 2 TABLETS BY MOUTH 4 TIMES DAILY  Qty: 240 tablet, Refills: 2    Comments: Not to exceed 2 additional fills before 03/06/2018.             Discharge Procedure Orders (must include Diet, Follow-up, Activity)    Follow Up:  Follow up with PCP as per your routine.  Please follow an anti reflux diet and a high fiber diet.  Activity as tolerated.    No driving day of procedure.    PROBIOTICS:  Now that your colon is so cleaned out, now is a good time for a round of PROBIOTICS.  Take a Probiotic  product such as Align or Culturelle or Mary-Q, every day for a month.                  (The products listed are non-prescription, but you may need to ask the pharmacist for their location.)  Repeat this 5-6  times a year.

## 2018-04-30 NOTE — DISCHARGE INSTRUCTIONS
Recovery After Procedural Sedation (Adult)  You have been given medicine by vein to make you sleep during your surgery. This may have included both a pain medicine and sleeping medicine. Most of the effects have worn off. But you may still have some drowsiness for the next 6 to 8 hours.  Home care  Follow these guidelines when you get home:  · For the next 8 hours, you should be watched by a responsible adult. This person should make sure your condition is not getting worse.  · Don't drink any alcohol for the next 24 hours.  · Don't drive, operate dangerous machinery, or make important business or personal decisions during the next 24 hours.  Note: Your healthcare provider may tell you not to take any medicine by mouth for pain or sleep in the next 4 hours. These medicines may react with the medicines you were given in the hospital. This could cause a much stronger response than usual.  Follow-up care  Follow up with your healthcare provider if you are not alert and back to your usual level of activity within 12 hours.  When to seek medical advice  Call your healthcare provider right away if any of these occur:  · Drowsiness gets worse  · Weakness or dizziness gets worse  · Repeated vomiting  · You can't be awakened   Date Last Reviewed: 10/18/2016  © 6565-6652 [employer]. [address] [place] [address], [place] [address]. All rights reserved. This information is not intended as a substitute for professional medical care. Always follow your healthcare professional's instructions.      PROBIOTICS:  Now that your colon is so cleaned out, now is a good time for a round of PROBIOTICS.  Take a Probiotic product such as Align or Culturelle or Mary-Q, every day for a month.                  (The products listed are non-prescription, but you may need to ask the pharmacist for their location.)  Repeat this 5-6  times a year.        Tips to Control Acid Reflux    To control acid reflux, youll need to make some  basic diet and lifestyle changes. The simple steps outlined below may be all youll need to ease discomfort.  Watch what you eat  · Avoid fatty foods and spicy foods.  · Eat fewer acidic foods, such as citrus and tomato-based foods. These can increase symptoms.  · Limit drinking alcohol, caffeine, and fizzy beverages. All increase acid reflux.  · Try limiting chocolate, peppermint, and spearmint. These can worsen acid reflux in some people.  Watch when you eat  · Avoid lying down for 3 hours after eating.  · Do not snack before going to bed.  Raise your head  Raising your head and upper body by 4 to 6 inches helps limit reflux when youre lying down. Put blocks under the head of your bed frame to raise it.  Other changes  · Lose weight, if you need to  · Dont exercise near bedtime  · Avoid tight-fitting clothes  · Limit aspirin and ibuprofen  · Stop smoking   Date Last Reviewed: 7/1/2016 © 2000-2017 [employer]. [address] [place] [address], [place] [address]. All rights reserved. This information is not intended as a substitute for professional medical care. Always follow your healthcare professional's instructions.        High-Fiber Diet  Fiber is in fruits, vegetables, cereals, and grains. Fiber passes through your body undigested. A high-fiber diet helps food move through your intestinal tract. The added bulk is helpful in preventing constipation. In people with diverticulosis, fiber helps clean out the pouches along the colon wall. It also prevents new pouches from forming. A high-fiber diet reduces the risk of colon cancer. It also lowers blood cholesterol and prevents high blood sugar in people with diabetes.    The fiber-rich foods listed below should be part of your diet. If you are not used to high-fiber foods, start with 1 or 2 foods from this list. Every 3 to 4 days add a new one to your diet. Do this until you are eating 4 high-fiber foods per day. This should give you 20 to 35 grams of fiber  a day. It is also important to drink a lot of water when you are on this diet. You should have 6 to 8 glasses of water a day. Water makes the fiber swell and increases the benefit.  Foods high in dietary fiber  The following foods are high in dietary fiber:  · Breads. Breads made with 100% whole-wheat flour; joshua, wheat, or rye crackers; whole-grain tortillas, bran muffins.  · Cereals. Whole-grain and bran cereals with bran (shredded wheat, wheat flakes, raisin bran, corn bran); oatmeal, rolled oats, granola, and brown rice.  · Fruits. Fresh fruits and their edible skins (pears, prunes, raisins, berries, apples, and apricots); bananas, citrus fruit, mangoes, pineapple; and prune juice.  · Nuts. Any nuts and seeds.  · Vegetables. Best served raw or lightly cooked. All types, especially: green peas, celery, eggplant, potatoes, spinach, broccoli, [place] sprouts, winter squash, carrots, cauliflower, soybeans, lentils, and fresh and dried beans of all kinds.  · Other. Popcorn, any spices.  Date Last Reviewed: 8/1/2016  © 5985-8732 [employer]. [address] [place] [address], [place] [address]. All rights reserved. This information is not intended as a substitute for professional medical care. Always follow your healthcare professional's instructions.      Meets criteria for discharge

## 2018-04-30 NOTE — PROVATION PATIENT INSTRUCTIONS
Discharge Summary/Instructions for after Colonoscopy with   Biopsy/Polypectomy  Fiorella Connelly    Monday, April 30, 2018  William Domingo MD  RESTRICTIONS ON ACTIVITY:  - Do not drive a car or operate machinery until the day after the procedure.      - The following day: return to full activity including work.  - For  3 days: No heavy lifting, straining or running.  - Diet: You can have solid foods, but no gassy foods (i.e. beans, broccoli,   cabbage, etc).  TREATMENT FOR COMMON SIDE EFFECTS:  - Mild abdominal pain and bloating or excessive gas: rest, eat lightly and   use a heating pad.  SYMPTOMS TO WATCH FOR AND REPORT TO YOUR PHYSICIAN:  1. Severe abdominal pain.  2. Fever within 24 hours after a procedure.  3. A large amount of rectal bleeding. (A small amount of blood from the   rectum is not serious, especially if hemorrhoids are present.  3.  Because air was put into your colon during the procedure, expelling   large amounts of air from your rectum is normal.  4.  You may not have a bowel movement for 1-3 days because of the   colonoscopy prep.  This is normal.  5.  Call immediately if you notice any of the following:   Chills and/or fever over 101   Persistent vomiting   Severe abdominal pain, other than gas cramps   Severe chest pain   Black, tarry stools   Any bleeding - exceeding one tablespoon  Your doctor recommends these additional instructions:  We are waiting for your pathology abnd culture results.   Eat a high fiber diet.   Take a fiber supplement, for example Citrucel, Fibercon, Konsyl or   Metamucil.   Take a PROBIOTIC, such as ALIGN or CULTURELLE or LARRY-Q (all   non-prescription), every day for a month.   And repeat this 5-6 times a   year.  For severe constipation, Take Miralax 1 capful (17 grams) in 8 ounces of   water by mouth once a day as needed.   For diarrhea, Take Imodium 1-2 caplets by mouth as needed after each loose   bowel movement.   Your physician has indicated that a repeat  colonoscopy is not recommended.      Follow up with Dr. Garibay as directed.  None  If you have any questions or problems, please call your physician.  EMERGENCY PHONE NUMBER: [phone]  LAB RESULTS: Call in two (2) weeks for lab results, [phone]  ___________________________________________  Nurse Signature  ___________________________________________  Patient/Designated Responsible Party Signature  William Domingo MD  4/30/2018 9:59:26 AM  This report has been verified and signed electronically.

## 2018-04-30 NOTE — H&P
History & Physical - Short Stay  Gastroenterology      SUBJECTIVE:     Procedure: Gastroscopy and Colonoscopy    Chief Complaint/Indication for Procedure:  GERD.    Irregular bowel habits.  FHx of colon cancer.  Possible colo-vesicular fistula.    History of Present Illness:  Office Visit     2/22/2018  [facility]      Aris Hoover MD   Family Medicine   Rheumatoid arthritis, involving unspecified site, unspecified rheumatoid factor presence +3 more   Dx   Medication Refill   Reason for Visit    Progress Notes     Expand All Collapse All        The patient presents today to fu LBP radiating both legs to knees. Also  PMR pain decr rom rt shoulder   HBP has been stable.  Has chronic anxiety   Hx esophageal strictures 2nd to GERD--GERD sig worse p 2 w. Currently on omeprazole.    Impression:Sacroiliitis  LBP  GERD c stricture   Radiculopathy  RA  Rt knee arthralgia   PMR  HBP  Anxiety  Impetigo   Plan:Lab eval rev   Rec diet and ex recs  Rev sig side effects current meds but has been well controlled on current regimen for years and will maintain for now             Office Visit  4/26/2018  [facility]      BIB Garibay MD   Urology   Urinary tract infection with hematuria, site unspecified   Dx   Dysuria; Referred by William Domingo Jr., MD   Reason for Visit    Progress Notes        Subjective:       Patient ID: Fiorella Connelly is a 89 y.o. female.     Chief Complaint: Dysuria     HPI      89 year old with urinary frequency and urgency and urgency incontinence.  She wears pads and soaks through.  Symptoms seemed to start acutely after hip surgery 9-10 months ago.  She also has painful urination and she is passing mucus and air in her urine intermittently.  It is unclear if she has been treated for a UTI.  No urine cultures were obtained.  She denies gross hematuria but has noticed dark brown sediment in her urine.  She has frequent diarrhea.  She has been diagnosed with IBS but no  previous diverticulitis.  No recent abdominal surgery.  She has a family history of colon cancer and is scheduled for colonoscopy next week.    Cath UA :  80 ml  Urine dipstick shows positive for 1+blood, positive for protein, positive for nitrates and positive for 3+leukocytes.  Micro exam: tntc WBC's per HPF, 10-15 RBC's per HPF and 3+ bacteria.     Assessment:       1. Urinary tract infection with hematuria, site unspecified        Plan:       Urinary tract infection with hematuria, site unspecified  -     POCT URINE DIPSTICK WITHOUT MICROSCOPE  -     Urine culture; Future; Expected date: 04/26/2018     Other orders  -     ciprofloxacin HCl (CIPRO) 500 MG tablet; Take 1 tablet (500 mg total) by mouth 2 (two) times daily.  Dispense: 20 tablet; Refill: 0       Has a UTI.   Possibly untreated for 9 months?  ?Gas forming organism.  Empiric Cipro and f/u urine culture.  Keep appt for colonoscopy.  ? CV fistula.                        PTA Medications   Medication Sig    acetaminophen (TYLENOL) 500 MG tablet Take 500 mg by mouth every 6 (six) hours as needed for Pain.    amlodipine (NORVASC) 5 MG tablet Take 1 tablet (5 mg total) by mouth once daily.    aspirin 81 MG Chew Take 81 mg by mouth once daily.    CALCIUM CARBONATE/VITAMIN D3 (CALCIUM 600 + D,3, ORAL) Take by mouth.    ciprofloxacin HCl (CIPRO) 500 MG tablet Take 1 tablet (500 mg total) by mouth 2 (two) times daily.    cloNIDine (CATAPRES) 0.2 MG tablet TAKE 1 TABLET BY MOUTH EVERY DAY    folic acid (FOLVITE) 1 MG tablet TAKE 1 TABLET BY MOUTH DAILY.    FOLIC ACID/MULTIVIT-MIN/LUTEIN (CENTRUM SILVER ORAL) Take by mouth.    gabapentin (NEURONTIN) 300 MG capsule TAKE ONE CAPSULE BY MOUTH EVERY MORNING, THEN 2 CAPSULE EVERY EVENING    irbesartan-hydrochlorothiazide (AVALIDE) 300-12.5 mg per tablet Take 1 tablet by mouth once daily.    loperamide (IMODIUM A-D) 2 mg Tab Take 2 mg by mouth 4 (four) times daily as needed.    loratadine (CLARITIN) 10 mg  tablet TAKE 1 TABLET BY MOUTH EVERY DAY    LORazepam (ATIVAN) 1 MG tablet TAKE 1 TABLET BY MOUTH TWICE A DAY    methotrexate 2.5 MG Tab TAKE 8 TABLETS BY MOUTH EVERY 7 DAYS.    metoprolol succinate (TOPROL-XL) 50 MG 24 hr tablet TAKE 1 TABLET BY MOUTH DAILY.    montelukast (SINGULAIR) 10 mg tablet Take 10 mg by mouth.    omeprazole (PRILOSEC) 20 MG capsule TAKE 1 CAPSULE BY MOUTH TWICE DAILY    predniSONE (DELTASONE) 20 MG tablet TAKE 1 TABLET BY MOUTH TWICE A DAY (Patient taking differently: pt taking once daily 20mg)    traMADol (ULTRAM) 50 mg tablet TAKE 2 TABLETS BY MOUTH 4 TIMES DAILY       Review of patient's allergies indicates:   Allergen Reactions    Xanax [alprazolam] Swelling    Cetirizine Other (See Comments)     hallucinations    Nsaids (non-steroidal anti-inflammatory drug)     Shellfish containing products Swelling    Sulfa (sulfonamide antibiotics) Nausea And Vomiting    Chlorhexidine Rash        Past Medical History:   Diagnosis Date    Acute pancreatitis     told chronic pancreatitis prior to diagnosis of gallstone and cholecystectomy    Fibromyalgia     GERD (gastroesophageal reflux disease)     Hypertension     Polymyalgia rheumatica     Rheumatoid arthritis     rheumatoid     Past Surgical History:   Procedure Laterality Date    APPENDECTOMY      CARPAL TUNNEL RELEASE Bilateral     CHOLECYSTECTOMY      COLONOSCOPY  ~2010    normal findings per patient report    EYE SURGERY Bilateral     cataracts    FOOT SURGERY Bilateral     HIP FRACTURE SURGERY  09/2017    HYSTERECTOMY      ovaries remain    UPPER GASTROINTESTINAL ENDOSCOPY  ~2010    with dilation     Family History   Problem Relation Age of Onset    Skin cancer Sister     Lung cancer Brother      smoker    Colon cancer Paternal Grandmother     Crohn's disease Neg Hx     Ulcerative colitis Neg Hx     Stomach cancer Neg Hx     Esophageal cancer Neg Hx      Social History   Substance Use Topics    Smoking  "status: Never Smoker    Smokeless tobacco: Never Used    Alcohol use No         OBJECTIVE:     Vital Signs (Most Recent)  Temp: 97.8 °F (36.6 °C) (04/30/18 0748)  Pulse: 78 (04/30/18 0748)  Resp: 17 (04/30/18 0748)  BP: (!) 152/71 (04/30/18 0748)  SpO2: 96 % (04/30/18 0748)    Physical Exam:           :  Ht 5' 2" (1.575 m)    Wt 75.2 kg (165 lb 12.6 oz)    BMI 30.32 kg/m²                                                      GENERAL:  Comfortable, in no acute distress.                                 HEENT EXAM:  Nonicteric.  No adenopathy.  Oropharynx is clear.               NECK:  Supple.                                                               LUNGS:  Clear.                                                               CARDIAC:  Regular rate and rhythm.  S1, S2.  No murmur.                      ABDOMEN:  Soft, positive bowel sounds, nontender.  No hepatosplenomegaly or masses.  No rebound or guarding.                                             EXTREMITIES:  No edema.     MENTAL STATUS:  Alert and oriented.    ASSESSMENT/PLAN:     Assessment: GERD.   Irregular bowel habits.  FHx of colon cancer.  Possible colo-vesicular fistula.  Aortic stenosis/ aortic regurg on Echo.  Will cover with Ancef.    Plan: Gastroscopy and Colonoscopy    Anesthesia Plan:   MAC / General Anaesthesia    ASA Grade: ASA 2 - Patient with mild systemic disease with no functional limitations    MALLAMPATI SCORE: II (hard and soft palate, upper portion of tonsils anduvula visible)    "

## 2018-04-30 NOTE — TRANSFER OF CARE
"Anesthesia Transfer of Care Note    Patient: Fiorella Connelly    Procedure(s) Performed: Procedure(s) (LRB):  ESOPHAGOGASTRODUODENOSCOPY (EGD) (N/A)  COLONOSCOPY (N/A)    Patient location: PACU    Anesthesia Type: general    Transport from OR: Transported from OR on room air with adequate spontaneous ventilation    Post pain: adequate analgesia    Post assessment: no apparent anesthetic complications    Post vital signs: stable    Level of consciousness: awake    Nausea/Vomiting: no nausea/vomiting    Complications: none    Transfer of care protocol was followed      Last vitals:   Visit Vitals  BP (!) 152/71 (BP Location: Right arm, Patient Position: Lying)   Pulse 78   Temp 36.6 °C (97.8 °F) (Skin)   Resp 17   Ht 5' 2" (1.575 m)   Wt 76.2 kg (168 lb)   SpO2 96%   Breastfeeding? No   BMI 30.73 kg/m²     "

## 2018-04-30 NOTE — ANESTHESIA POSTPROCEDURE EVALUATION
"Anesthesia Post Evaluation    Patient: Fiorella Connelly    Procedure(s) Performed: Procedure(s) (LRB):  ESOPHAGOGASTRODUODENOSCOPY (EGD) (N/A)  COLONOSCOPY (N/A)    Final Anesthesia Type: general  Patient location during evaluation: PACU  Patient participation: Yes- Able to Participate  Level of consciousness: awake and alert  Post-procedure vital signs: reviewed and stable  Pain management: adequate  Airway patency: patent  PONV status at discharge: No PONV  Anesthetic complications: no      Cardiovascular status: hemodynamically stable and blood pressure returned to baseline  Respiratory status: unassisted, spontaneous ventilation and room air  Hydration status: euvolemic  Follow-up not needed.        Visit Vitals  BP (!) 170/71 (BP Location: Left arm, Patient Position: Lying)   Pulse 84   Temp 36.4 °C (97.5 °F) (Skin)   Resp 18   Ht 5' 2" (1.575 m)   Wt 76.2 kg (168 lb)   SpO2 98%   Breastfeeding? No   BMI 30.73 kg/m²       Pain/Florencio Score: Pain Assessment Performed: Yes (4/30/2018  9:58 AM)  Presence of Pain: denies (4/30/2018  9:58 AM)  Florencio Score: 10 (4/30/2018  9:58 AM)      "

## 2018-04-30 NOTE — ANESTHESIA PREPROCEDURE EVALUATION
04/30/2018  Fiorella Connelly is a 89 y.o., female.    Anesthesia Evaluation      I have reviewed the Medications.   Steroids Taken In Past Year: Prednisone    Review of Systems  Anesthesia Hx:  No problems with previous Anesthesia   Social:  Non-Smoker, No Alcohol Use    Cardiovascular:   Hypertension    Pulmonary:  Pulmonary Normal    Renal/:  Renal/ Normal     Hepatic/GI:   GERD    Musculoskeletal:   PMR, RA, fibromyalgia   Neurological:  Neurology Normal    Endocrine:  Endocrine Normal    Psych:   anxiety          Physical Exam  General:  Obesity    Airway/Jaw/Neck:  Airway Findings: Mouth Opening: Normal General Airway Assessment: Adult  Mallampati: III  Jaw/Neck Findings:  Neck ROM: Extension Decreased, Mild      Dental:  Dental Findings: Lower Dentures, Upper Dentures   Chest/Lungs:  Chest/Lungs Findings: Clear to auscultation, Normal Respiratory Rate     Heart/Vascular:  Heart Findings: Rate: Normal  Rhythm: Regular Rhythm  Sounds: Normal  Heart murmur: negative Vascular Findings: Normal (No carotid bruits.)       Mental Status:  Mental Status Findings:  Cooperative, Alert and Oriented         Anesthesia Plan  Type of Anesthesia, risks & benefits discussed:  Anesthesia Type:  general  Patient's Preference:   Intra-op Monitoring Plan:   Intra-op Monitoring Plan Comments:   Post Op Pain Control Plan:   Post Op Pain Control Plan Comments:   Induction:   IV  Beta Blocker:  Patient is not currently on a Beta-Blocker (No further documentation required).       Informed Consent: Patient understands risks and agrees with Anesthesia plan.  Questions answered. Anesthesia consent signed with patient.  ASA Score: 3     Day of Surgery Review of History & Physical:        Anesthesia Plan Notes: Propofol general.        Ready For Surgery From Anesthesia Perspective.

## 2018-05-07 ENCOUNTER — PATIENT OUTREACH (OUTPATIENT)
Dept: ADMINISTRATIVE | Facility: HOSPITAL | Age: 83
End: 2018-05-07

## 2018-05-08 ENCOUNTER — TELEPHONE (OUTPATIENT)
Dept: FAMILY MEDICINE | Facility: CLINIC | Age: 83
End: 2018-05-08

## 2018-05-08 NOTE — TELEPHONE ENCOUNTER
----- Message from Mauricio Holland sent at 5/7/2018  4:41 PM CDT -----  Contact: daughter  Call daughter at [phone] pt is having issues with arthritis

## 2018-05-08 NOTE — TELEPHONE ENCOUNTER
pts daughter states Mrs. Barros is having a lot of weakness and pain. Asking if her prednisone can be increased back to 40mg until she sees you on the 21st?

## 2018-05-10 ENCOUNTER — OFFICE VISIT (OUTPATIENT)
Dept: UROLOGY | Facility: CLINIC | Age: 83
End: 2018-05-10
Payer: MEDICARE

## 2018-05-10 VITALS
DIASTOLIC BLOOD PRESSURE: 75 MMHG | HEART RATE: 57 BPM | BODY MASS INDEX: 30.76 KG/M2 | WEIGHT: 167.13 LBS | SYSTOLIC BLOOD PRESSURE: 175 MMHG | HEIGHT: 62 IN

## 2018-05-10 DIAGNOSIS — R35.0 INCREASED URINARY FREQUENCY: Primary | ICD-10-CM

## 2018-05-10 DIAGNOSIS — R39.15 URINARY URGENCY: ICD-10-CM

## 2018-05-10 LAB
BILIRUB SERPL-MCNC: NEGATIVE MG/DL
BLOOD URINE, POC: NEGATIVE
COLOR, POC UA: NORMAL
GLUCOSE UR QL STRIP: NEGATIVE
KETONES UR QL STRIP: NEGATIVE
LEUKOCYTE ESTERASE URINE, POC: NORMAL
NITRITE, POC UA: NEGATIVE
PH, POC UA: 5.5
PROTEIN, POC: 30
SPECIFIC GRAVITY, POC UA: 1.02
UROBILINOGEN, POC UA: 0.2

## 2018-05-10 PROCEDURE — 99213 OFFICE O/P EST LOW 20 MIN: CPT | Mod: S$PBB,,, | Performed by: UROLOGY

## 2018-05-10 PROCEDURE — 87086 URINE CULTURE/COLONY COUNT: CPT

## 2018-05-10 PROCEDURE — 99999 PR PBB SHADOW E&M-EST. PATIENT-LVL III: CPT | Mod: PBBFAC,,, | Performed by: UROLOGY

## 2018-05-10 PROCEDURE — 81002 URINALYSIS NONAUTO W/O SCOPE: CPT | Mod: PBBFAC,PO | Performed by: UROLOGY

## 2018-05-10 PROCEDURE — 99213 OFFICE O/P EST LOW 20 MIN: CPT | Mod: PBBFAC,PO | Performed by: UROLOGY

## 2018-05-10 NOTE — PROGRESS NOTES
Subjective:       Patient ID: Fiorella Connelly is a 89 y.o. female.    Chief Complaint: Follow-up    HPI     89 year old with urinary frequency and urgency and urgency incontinence.   She also has painful urination and she is passing mucus and air in her urine intermittently.  Colonoscopy was normal.  Culture positive E. Coli and she has now completed a 10 day course of Cipro.  She feels better.  No more urinary symptoms.  She does have urina frequency and urgency which has been a long term problems.   Cath UA : Urine dipstick shows negative for nitrites, positive for 1+leukocytes, trace blood  PVR 0 ml    Review of Systems   Constitutional: Negative for fever.   Genitourinary: Negative for dysuria and hematuria.       Objective:      Physical Exam   Constitutional: She is oriented to person, place, and time. She appears well-developed and well-nourished.   Pulmonary/Chest: Effort normal. No respiratory distress.   Neurological: She is alert and oriented to person, place, and time.   Skin: Skin is warm.   Psychiatric: She has a normal mood and affect. Her behavior is normal.   Vitals reviewed.      Assessment:       1. Increased urinary frequency    2. Urinary urgency        Plan:       Increased urinary frequency  -     POCT URINE DIPSTICK WITHOUT MICROSCOPE  -     Urine culture; Future; Expected date: 05/10/2018    Urinary urgency  -     POCT URINE DIPSTICK WITHOUT MICROSCOPE  -     Urine culture; Future; Expected date: 05/10/2018    Other orders  -     oxybutynin (DITROPAN-XL) 5 MG TR24; Take 1 tablet (5 mg total) by mouth once daily.  Dispense: 30 tablet; Refill: 11

## 2018-05-11 ENCOUNTER — TELEPHONE (OUTPATIENT)
Dept: UROLOGY | Facility: CLINIC | Age: 83
End: 2018-05-11

## 2018-05-11 NOTE — TELEPHONE ENCOUNTER
----- Message from Caleb Lauren sent at 5/11/2018  1:42 PM CDT -----  Contact: daughterTarik Em [phone]  Type: Needs Medical Advice    Who Called:  NATHAN Symptoms (please be specific): NA How long has patient had these symptoms:  NATHAN   Pharmacy name and phone #:  NATHAN   Best Call Back Number: [phone]  Additional Information: Patient was seen yesterday, patient was advised a rx for frequent urination would be called into the pharmacy for the patient. [facility] [place] has not received the rx.

## 2018-05-12 LAB — BACTERIA UR CULT: NO GROWTH

## 2018-05-13 RX ORDER — AMLODIPINE BESYLATE 5 MG/1
5 TABLET ORAL DAILY
Qty: 30 TABLET | Refills: 12 | Status: SHIPPED | OUTPATIENT
Start: 2018-05-13 | End: 2019-01-01 | Stop reason: SDUPTHER

## 2018-05-13 RX ORDER — FOLIC ACID 1 MG/1
TABLET ORAL
Qty: 30 TABLET | Refills: 12 | Status: SHIPPED | OUTPATIENT
Start: 2018-05-13 | End: 2018-05-21 | Stop reason: SDUPTHER

## 2018-05-14 RX ORDER — OXYBUTYNIN CHLORIDE 5 MG/1
5 TABLET, EXTENDED RELEASE ORAL DAILY
Qty: 30 TABLET | Refills: 11 | Status: SHIPPED | OUTPATIENT
Start: 2018-05-14 | End: 2019-01-01 | Stop reason: SDUPTHER

## 2018-05-21 ENCOUNTER — OFFICE VISIT (OUTPATIENT)
Dept: FAMILY MEDICINE | Facility: CLINIC | Age: 83
End: 2018-05-21
Payer: MEDICARE

## 2018-05-21 VITALS
HEART RATE: 66 BPM | DIASTOLIC BLOOD PRESSURE: 67 MMHG | TEMPERATURE: 98 F | HEIGHT: 62 IN | WEIGHT: 169.19 LBS | SYSTOLIC BLOOD PRESSURE: 138 MMHG | BODY MASS INDEX: 31.13 KG/M2

## 2018-05-21 DIAGNOSIS — M06.9 RHEUMATOID ARTHRITIS, INVOLVING UNSPECIFIED SITE, UNSPECIFIED RHEUMATOID FACTOR PRESENCE: ICD-10-CM

## 2018-05-21 DIAGNOSIS — K21.9 GASTROESOPHAGEAL REFLUX DISEASE, ESOPHAGITIS PRESENCE NOT SPECIFIED: ICD-10-CM

## 2018-05-21 DIAGNOSIS — B37.9 MONILIA INFECTION: Primary | ICD-10-CM

## 2018-05-21 DIAGNOSIS — I10 BENIGN ESSENTIAL HTN: ICD-10-CM

## 2018-05-21 DIAGNOSIS — R32 URINARY INCONTINENCE, UNSPECIFIED TYPE: ICD-10-CM

## 2018-05-21 PROBLEM — R05.3 CHRONIC COUGH: Status: RESOLVED | Noted: 2018-01-19 | Resolved: 2018-05-21

## 2018-05-21 PROCEDURE — 99214 OFFICE O/P EST MOD 30 MIN: CPT | Mod: S$PBB,,, | Performed by: FAMILY MEDICINE

## 2018-05-21 PROCEDURE — 99213 OFFICE O/P EST LOW 20 MIN: CPT | Mod: PBBFAC,PO | Performed by: FAMILY MEDICINE

## 2018-05-21 PROCEDURE — 99999 PR PBB SHADOW E&M-EST. PATIENT-LVL III: CPT | Mod: PBBFAC,,, | Performed by: FAMILY MEDICINE

## 2018-05-21 RX ORDER — FOLIC ACID 1 MG/1
1000 TABLET ORAL DAILY
Qty: 30 TABLET | Refills: 12 | Status: SHIPPED | OUTPATIENT
Start: 2018-05-21 | End: 2019-01-01

## 2018-05-21 RX ORDER — FLUCONAZOLE 150 MG/1
150 TABLET ORAL DAILY
Qty: 1 TABLET | Refills: 0 | Status: SHIPPED | OUTPATIENT
Start: 2018-05-21 | End: 2018-05-22

## 2018-05-21 RX ORDER — LORAZEPAM 1 MG/1
TABLET ORAL
Qty: 60 TABLET | Refills: 5 | Status: SHIPPED | OUTPATIENT
Start: 2018-05-21 | End: 2018-01-01 | Stop reason: SDUPTHER

## 2018-05-21 RX ORDER — TRAMADOL HYDROCHLORIDE 50 MG/1
TABLET ORAL
Qty: 240 TABLET | Refills: 2 | Status: SHIPPED | OUTPATIENT
Start: 2018-05-21 | End: 2018-08-07 | Stop reason: SDUPTHER

## 2018-05-21 NOTE — PROGRESS NOTES
The patient presents today to fu LBP radiating both legs to knees. Also  PMR pain decr rom rt shoulder   HBP has been stable.  Has chronic anxiety   Hx esophageal strictures 2nd to GERD--GERD sig worse p 2 w. Currently on omeprazole.     Past Medical History:  Past Medical History:   Diagnosis Date    Acute pancreatitis     told chronic pancreatitis prior to diagnosis of gallstone and cholecystectomy    Fibromyalgia     GERD (gastroesophageal reflux disease)     Hypertension     Polymyalgia rheumatica     Rheumatoid arthritis     rheumatoid     Past Surgical History:   Procedure Laterality Date    APPENDECTOMY      CARPAL TUNNEL RELEASE Bilateral     CHOLECYSTECTOMY      COLONOSCOPY  ~2010    normal findings per patient report    COLONOSCOPY N/A 4/30/2018    Procedure: COLONOSCOPY;  Surgeon: William Domingo Jr., MD;  [facility];  Service: Endoscopy;  Laterality: N/A;    EYE SURGERY Bilateral     cataracts    FOOT SURGERY Bilateral     HIP FRACTURE SURGERY  09/2017    HYSTERECTOMY      ovaries remain    UPPER GASTROINTESTINAL ENDOSCOPY  ~2010    with dilation     Review of patient's allergies indicates:   Allergen Reactions    Xanax [alprazolam] Swelling    Shellfish containing products Swelling    Sulfa (sulfonamide antibiotics) Nausea And Vomiting     Current Outpatient Prescriptions on File Prior to Visit   Medication Sig Dispense Refill    acetaminophen (TYLENOL) 500 MG tablet Take 500 mg by mouth every 6 (six) hours as needed for Pain.      amLODIPine (NORVASC) 5 MG tablet TAKE 1 TABLET (5 MG TOTAL) BY MOUTH ONCE DAILY. 30 tablet 12    aspirin 81 MG Chew Take 81 mg by mouth once daily.      CALCIUM CARBONATE/VITAMIN D3 (CALCIUM 600 + D,3, ORAL) Take by mouth.      cloNIDine (CATAPRES) 0.2 MG tablet TAKE 1 TABLET BY MOUTH EVERY DAY 90 tablet 12    folic acid (FOLVITE) 1 MG tablet TAKE 1 TABLET BY MOUTH DAILY. 30 tablet 12    FOLIC ACID/MULTIVIT-MIN/LUTEIN (CENTRUM SILVER  ORAL) Take by mouth.      gabapentin (NEURONTIN) 300 MG capsule TAKE ONE CAPSULE BY MOUTH EVERY MORNING, THEN 2 CAPSULE EVERY EVENING 270 capsule 4    irbesartan-hydrochlorothiazide (AVALIDE) 300-12.5 mg per tablet Take 1 tablet by mouth once daily. 90 tablet 3    loperamide (IMODIUM A-D) 2 mg Tab Take 2 mg by mouth 4 (four) times daily as needed.      loratadine (CLARITIN) 10 mg tablet TAKE 1 TABLET BY MOUTH EVERY DAY 90 tablet 3    LORazepam (ATIVAN) 1 MG tablet TAKE 1 TABLET BY MOUTH TWICE A DAY 60 tablet 5    methotrexate 2.5 MG Tab TAKE 8 TABLETS BY MOUTH EVERY 7 DAYS. 40 tablet 12    metoprolol succinate (TOPROL-XL) 50 MG 24 hr tablet TAKE 1 TABLET BY MOUTH DAILY. 30 tablet 12    montelukast (SINGULAIR) 10 mg tablet Take 10 mg by mouth.      omeprazole (PRILOSEC) 20 MG capsule TAKE 1 CAPSULE BY MOUTH TWICE DAILY 180 capsule 3    oxybutynin (DITROPAN-XL) 5 MG TR24 Take 1 tablet (5 mg total) by mouth once daily. 30 tablet 11    predniSONE (DELTASONE) 20 MG tablet TAKE 1 TABLET BY MOUTH TWICE A DAY (Patient taking differently: pt taking once daily 40 mg) 60 tablet 6    traMADol (ULTRAM) 50 mg tablet TAKE 2 TABLETS BY MOUTH 4 TIMES DAILY 240 tablet 2     No current facility-administered medications on file prior to visit.      Social History     Social History    Marital status:      Spouse name: N/A    Number of children: N/A    Years of education: N/A     Occupational History    Not on file.     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  SKIN: No rashes, itching or changes in color or texture of skin.  HEAD: No headaches or recent head trauma.EYES: Visual acuity fine. No photophobia, ocular pain or diplopia.EARS: Denies ear pain, discharge or vertigo.NOSE: No loss of smell, no epistaxis or postnasal drip.MOUTH & THROAT: No hoarseness or change in voice. No excessive gum bleeding.NODES: Denies swollen glands.  CHEST: Denies DURAN, cyanosis, wheezing, cough and sputum production.  CARDIOVASCULAR: Denies chest pain, PND, orthopnea or reduced exercise tolerance.  ABDOMEN: Appetite fine. No weight loss. Denies diarrhea, abdominal pain, hematemesis or blood in stool.  URINARY: No flank pain, dysuria or hematuria.  PERIPHERAL VASCULAR: No claudication or cyanosis.  MUSCULOSKELETAL: See above.  NEUROLOGIC: No history of seizures, paralysis, alteration of gait or coordination.  PE:   HEAD: Normocephalic, atraumatic.EYES: PERRL. EOMI.   EARS: TM's intact. Light reflex normal. No retraction or perforation.   NOSE: Mucosa pink. Airway clear.MOUTH & THROAT: No tonsillar enlargement. No pharyngeal erythema or exudate. No stridor.  NODES: No cervical, axillary or inguinal lymph node enlargement.  CHEST: Lungs clear to auscultation.  CARDIOVASCULAR: Normal S1, S2. No rubs, murmurs or gallops.  ABDOMEN: Bowel sounds normal. Not distended. Soft. No tenderness or masses.  MUSCULOSKELETAL: Hand deformities and gen arthralgia. Rt post s[place]oulder tender rom limited tender swollen;tender paralumbar and rt SI     NEUROLOGIC: Cranial Nerves: II-XII grossly intact.  Motor: 5/5 strength major flexors/extensors.  DTR's: Knees, Ankles 2+ and equal bilaterally; downgoing toes.  Sensory: Intact to light touch distally.  Gait & Posture: Normal gait and fine motion. No cerebellar signs.     Impression:Urinary  incontinence  Sacroiliitis  LBP  GERD c stricture   Radiculopathy  RA  Rt knee arthralgia   PMR  HBP  Anxiety  Impetigo   Plan:Lab eval rev   Rec diet and ex recs  Rev sig side effects current meds but has been well controlled on current regimen for years and will maintain for now

## 2018-06-04 RX ORDER — PREDNISONE 20 MG/1
TABLET ORAL
Qty: 60 TABLET | Refills: 6 | Status: ON HOLD | OUTPATIENT
Start: 2018-06-04 | End: 2018-01-01 | Stop reason: HOSPADM

## 2018-07-04 RX ORDER — LORATADINE 10 MG/1
TABLET ORAL
Qty: 90 TABLET | Refills: 3 | Status: SHIPPED | OUTPATIENT
Start: 2018-07-04 | End: 2019-01-01 | Stop reason: SDUPTHER

## 2018-08-06 ENCOUNTER — TELEPHONE (OUTPATIENT)
Dept: FAMILY MEDICINE | Facility: CLINIC | Age: 83
End: 2018-08-06

## 2018-08-06 NOTE — TELEPHONE ENCOUNTER
----- Message from Jaja Enrique sent at 8/6/2018  4:04 PM CDT -----  Pt daughter(Maria Antonia) at [phone]//states pt has a bad cough for a couple of days//is wanting to know if possible to have an appt with Dr Hoover//please call//thanks//[facility]

## 2018-08-07 ENCOUNTER — LAB VISIT (OUTPATIENT)
Dept: LAB | Facility: HOSPITAL | Age: 83
End: 2018-08-07
Attending: FAMILY MEDICINE
Payer: MEDICARE

## 2018-08-07 ENCOUNTER — OFFICE VISIT (OUTPATIENT)
Dept: FAMILY MEDICINE | Facility: CLINIC | Age: 83
End: 2018-08-07
Payer: MEDICARE

## 2018-08-07 VITALS
HEART RATE: 70 BPM | SYSTOLIC BLOOD PRESSURE: 116 MMHG | WEIGHT: 167 LBS | TEMPERATURE: 99 F | HEIGHT: 62 IN | BODY MASS INDEX: 30.73 KG/M2 | DIASTOLIC BLOOD PRESSURE: 62 MMHG

## 2018-08-07 DIAGNOSIS — J06.9 VIRAL UPPER RESPIRATORY TRACT INFECTION: Primary | ICD-10-CM

## 2018-08-07 DIAGNOSIS — I10 BENIGN ESSENTIAL HTN: ICD-10-CM

## 2018-08-07 DIAGNOSIS — E03.9 HYPOTHYROIDISM, UNSPECIFIED TYPE: ICD-10-CM

## 2018-08-07 DIAGNOSIS — N39.0 URINARY TRACT INFECTION WITHOUT HEMATURIA, SITE UNSPECIFIED: ICD-10-CM

## 2018-08-07 DIAGNOSIS — K58.9 IRRITABLE BOWEL SYNDROME, UNSPECIFIED TYPE: ICD-10-CM

## 2018-08-07 DIAGNOSIS — M06.9 RHEUMATOID ARTHRITIS INVOLVING MULTIPLE SITES, UNSPECIFIED RHEUMATOID FACTOR PRESENCE: ICD-10-CM

## 2018-08-07 DIAGNOSIS — K21.9 GASTROESOPHAGEAL REFLUX DISEASE, ESOPHAGITIS PRESENCE NOT SPECIFIED: ICD-10-CM

## 2018-08-07 LAB
ALBUMIN SERPL BCP-MCNC: 3.4 G/DL
ALP SERPL-CCNC: 87 U/L
ALT SERPL W/O P-5'-P-CCNC: 17 U/L
ANION GAP SERPL CALC-SCNC: 12 MMOL/L
AST SERPL-CCNC: 16 U/L
BASOPHILS # BLD AUTO: 0.03 K/UL
BASOPHILS NFR BLD: 0.3 %
BILIRUB SERPL-MCNC: 0.7 MG/DL
BUN SERPL-MCNC: 17 MG/DL
CALCIUM SERPL-MCNC: 9.4 MG/DL
CHLORIDE SERPL-SCNC: 101 MMOL/L
CO2 SERPL-SCNC: 29 MMOL/L
CREAT SERPL-MCNC: 0.9 MG/DL
DIFFERENTIAL METHOD: ABNORMAL
EOSINOPHIL # BLD AUTO: 0 K/UL
EOSINOPHIL NFR BLD: 0.4 %
ERYTHROCYTE [DISTWIDTH] IN BLOOD BY AUTOMATED COUNT: 17.5 %
EST. GFR  (AFRICAN AMERICAN): >60 ML/MIN/1.73 M^2
EST. GFR  (NON AFRICAN AMERICAN): 56.9 ML/MIN/1.73 M^2
GLUCOSE SERPL-MCNC: 158 MG/DL
HCT VFR BLD AUTO: 38 %
HGB BLD-MCNC: 11.7 G/DL
IMM GRANULOCYTES # BLD AUTO: 0.21 K/UL
IMM GRANULOCYTES NFR BLD AUTO: 2.2 %
LYMPHOCYTES # BLD AUTO: 1 K/UL
LYMPHOCYTES NFR BLD: 9.9 %
MCH RBC QN AUTO: 31.7 PG
MCHC RBC AUTO-ENTMCNC: 30.8 G/DL
MCV RBC AUTO: 103 FL
MONOCYTES # BLD AUTO: 0.6 K/UL
MONOCYTES NFR BLD: 5.8 %
NEUTROPHILS # BLD AUTO: 7.9 K/UL
NEUTROPHILS NFR BLD: 81.4 %
NRBC BLD-RTO: 0 /100 WBC
PLATELET # BLD AUTO: 328 K/UL
PMV BLD AUTO: 9.3 FL
POTASSIUM SERPL-SCNC: 3.1 MMOL/L
PROT SERPL-MCNC: 6.4 G/DL
RBC # BLD AUTO: 3.69 M/UL
SODIUM SERPL-SCNC: 142 MMOL/L
TSH SERPL DL<=0.005 MIU/L-ACNC: 2.24 UIU/ML
WBC # BLD AUTO: 9.69 K/UL

## 2018-08-07 PROCEDURE — 80053 COMPREHEN METABOLIC PANEL: CPT

## 2018-08-07 PROCEDURE — 85025 COMPLETE CBC W/AUTO DIFF WBC: CPT

## 2018-08-07 PROCEDURE — 36415 COLL VENOUS BLD VENIPUNCTURE: CPT | Mod: PO

## 2018-08-07 PROCEDURE — 99214 OFFICE O/P EST MOD 30 MIN: CPT | Mod: S$PBB,,, | Performed by: FAMILY MEDICINE

## 2018-08-07 PROCEDURE — 84443 ASSAY THYROID STIM HORMONE: CPT

## 2018-08-07 PROCEDURE — 99999 PR PBB SHADOW E&M-EST. PATIENT-LVL IV: CPT | Mod: PBBFAC,,, | Performed by: FAMILY MEDICINE

## 2018-08-07 PROCEDURE — 81000 URINALYSIS NONAUTO W/SCOPE: CPT | Mod: PO

## 2018-08-07 PROCEDURE — 99214 OFFICE O/P EST MOD 30 MIN: CPT | Mod: PBBFAC,PO | Performed by: FAMILY MEDICINE

## 2018-08-07 RX ORDER — TRAMADOL HYDROCHLORIDE 50 MG/1
TABLET ORAL
Qty: 240 TABLET | Refills: 2 | Status: SHIPPED | OUTPATIENT
Start: 2018-08-18 | End: 2018-01-01 | Stop reason: SDUPTHER

## 2018-08-07 RX ORDER — ERYTHROMYCIN 5 MG/G
OINTMENT OPHTHALMIC
Refills: 1 | COMMUNITY
Start: 2018-07-16 | End: 2018-09-19

## 2018-08-07 RX ORDER — TOBRAMYCIN AND DEXAMETHASONE 3; 1 MG/ML; MG/ML
SUSPENSION/ DROPS OPHTHALMIC
Refills: 1 | COMMUNITY
Start: 2018-07-16 | End: 2018-09-19

## 2018-08-07 RX ORDER — MEGESTROL ACETATE 40 MG/1
40 TABLET ORAL DAILY
Qty: 30 TABLET | Refills: 11 | Status: SHIPPED | OUTPATIENT
Start: 2018-08-07 | End: 2019-01-01 | Stop reason: SDUPTHER

## 2018-08-07 NOTE — PROGRESS NOTES
Fiorella Connelly presents with moderate upper respiratory congestion,rhinnorhea,moderate cough past 2-3 days. OTC help slightly. Denies nausea,vomiting,diarrhea or significant fever.  The patient presents today to fu LBP radiating both legs to knees. Also  PMR pain decr rom rt shoulder   HBP has been stable.  Has chronic anxiety   Hx esophageal strictures 2nd to GERD--GERD sig worse p 2 w. Currently on omeprazole.  Past Medical History:   Diagnosis Date    Acute pancreatitis     told chronic pancreatitis prior to diagnosis of gallstone and cholecystectomy    Fibromyalgia     GERD (gastroesophageal reflux disease)     Hypertension     Polymyalgia rheumatica     Rheumatoid arthritis     rheumatoid     Past Surgical History:   Procedure Laterality Date    APPENDECTOMY      CARPAL TUNNEL RELEASE Bilateral     CHOLECYSTECTOMY      COLONOSCOPY  ~2010    normal findings per patient report    COLONOSCOPY N/A 4/30/2018    Procedure: COLONOSCOPY;  Surgeon: William Domingo Jr., MD;  Location: [facility];  Service: Endoscopy;  Laterality: N/A;    EYE SURGERY Bilateral     cataracts    FOOT SURGERY Bilateral     HIP FRACTURE SURGERY  09/2017    HYSTERECTOMY      ovaries remain    UPPER GASTROINTESTINAL ENDOSCOPY  ~2010    with dilation     Review of patient's allergies indicates:   Allergen Reactions    Xanax [alprazolam] Swelling    Cetirizine Other (See Comments)     hallucinations    Nsaids (non-steroidal anti-inflammatory drug)     Shellfish containing products Swelling    Sulfa (sulfonamide antibiotics) Nausea And Vomiting    Chlorhexidine Rash     Current Outpatient Prescriptions on File Prior to Visit   Medication Sig Dispense Refill    acetaminophen (TYLENOL) 500 MG tablet Take 500 mg by mouth every 6 (six) hours as needed for Pain.      amLODIPine (NORVASC) 5 MG tablet TAKE 1 TABLET (5 MG TOTAL) BY MOUTH ONCE DAILY. 30 tablet 12    aspirin 81 MG Chew Take 81 mg by mouth once daily.       CALCIUM CARBONATE/VITAMIN D3 (CALCIUM 600 + D,3, ORAL) Take by mouth.      cloNIDine (CATAPRES) 0.2 MG tablet TAKE 1 TABLET BY MOUTH EVERY DAY 90 tablet 12    folic acid (FOLVITE) 1 MG tablet Take 1 tablet (1,000 mcg total) by mouth once daily. 30 tablet 12    FOLIC ACID/MULTIVIT-MIN/LUTEIN (CENTRUM SILVER ORAL) Take by mouth.      gabapentin (NEURONTIN) 300 MG capsule TAKE ONE CAPSULE BY MOUTH EVERY MORNING, THEN 2 CAPSULE EVERY EVENING 270 capsule 4    irbesartan-hydrochlorothiazide (AVALIDE) 300-12.5 mg per tablet Take 1 tablet by mouth once daily. 90 tablet 3    loperamide (IMODIUM A-D) 2 mg Tab Take 2 mg by mouth 4 (four) times daily as needed.      loratadine (CLARITIN) 10 mg tablet TAKE 1 TABLET BY MOUTH EVERY DAY 90 tablet 3    LORazepam (ATIVAN) 1 MG tablet TAKE 1 TABLET BY MOUTH TWICE A DAY 60 tablet 5    methotrexate 2.5 MG Tab TAKE 8 TABLETS BY MOUTH EVERY 7 DAYS. 40 tablet 12    metoprolol succinate (TOPROL-XL) 50 MG 24 hr tablet TAKE 1 TABLET BY MOUTH DAILY. 30 tablet 12    montelukast (SINGULAIR) 10 mg tablet Take 10 mg by mouth.      omeprazole (PRILOSEC) 20 MG capsule TAKE 1 CAPSULE BY MOUTH TWICE DAILY 180 capsule 3    oxybutynin (DITROPAN-XL) 5 MG TR24 Take 1 tablet (5 mg total) by mouth once daily. 30 tablet 11    predniSONE (DELTASONE) 20 MG tablet TAKE 1 TABLET BY MOUTH TWICE A DAY 60 tablet 6    psyllium (METAMUCIL) packet Take 1 packet by mouth once daily.      traMADol (ULTRAM) 50 mg tablet TAKE 2 TABLETS BY MOUTH 4 TIMES DAILY 240 tablet 2     No current facility-administered medications on file prior to visit.      Social History     Social History    Marital status:      Spouse name: N/A    Number of children: N/A    Years of education: N/A     Occupational History    Not on file.     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  EYES: Visual acuity fine. No photophobia, ocular pain or diplopia.EARS: Denies ear pain, discharge or vertigo.NOSE: No loss of smell, no epistaxis some postnasal drip.MOUTH & THROAT: No hoarseness or change in voice. No excessive gum bleeding.CHEST: Denies DURAN, cyanosis, wheezing  CARDIOVASCULAR: Denies chest pain, PND, orthopnea or reduced exercise tolerance.  ABDOMEN:  No weight loss.No abdominal pain, no hematemesis or blood in stool.  URINARY: No flank pain, dysuria or hematuria.  PERIPHERAL VASCULAR: No claudication or cyanosis.  MUSCULOSKELETAL: Negative   NEUROLOGIC: No history of seizures, paralysis, alteration of gait or coordination.    PE: Vital signs as noted  Heent:Normocephalic with no recent cranial trauma,PERRLA,EOMI,conjunctiva clear,fundi reveal no hemmorhage exudate or papilledema.Otic canals clear, tympanic membranes slightly dull bilaterally.Nasal mucosa slightly red and edematous.Posterior pharynx slightly red but without exudate.  Neck:Supple with minimal anterior cervical adenopathy.  Chest:Clear bilateral breath sounds with mild scattered ronchi  Heart:Regular rhthym without murmer  Abdomen:Soft, non tender,no masses, no hepatosplenomegalyExtremeties and Neurologic:No acute findings mod severe degen changes     Impression:URI  Urinary incontinence  Sacroiliitis  LBP  GERD c stricture   Radiculopathy  RA  Rt knee arthralgia   PMR  HBP  Anxiety        Plan:Lab eval rev   Rec diet and ex recs  Rev sig side effects  current meds  If BP cont at this level hold clonidine  Restarty fiber for IBS   Taper pred 40 to 20 qd

## 2018-08-08 ENCOUNTER — LAB VISIT (OUTPATIENT)
Dept: LAB | Facility: HOSPITAL | Age: 83
End: 2018-08-08
Attending: NURSE PRACTITIONER
Payer: MEDICARE

## 2018-08-08 ENCOUNTER — TELEPHONE (OUTPATIENT)
Dept: FAMILY MEDICINE | Facility: CLINIC | Age: 83
End: 2018-08-08

## 2018-08-08 DIAGNOSIS — R19.8 IRREGULAR BOWEL HABITS: ICD-10-CM

## 2018-08-08 DIAGNOSIS — N39.0 URINARY TRACT INFECTION WITHOUT HEMATURIA, SITE UNSPECIFIED: Primary | ICD-10-CM

## 2018-08-08 LAB
BACTERIA #/AREA URNS HPF: ABNORMAL /HPF
BILIRUB UR QL STRIP: NEGATIVE
CLARITY UR: CLEAR
COLOR UR: YELLOW
GLUCOSE UR QL STRIP: NEGATIVE
HGB UR QL STRIP: ABNORMAL
KETONES UR QL STRIP: NEGATIVE
LEUKOCYTE ESTERASE UR QL STRIP: ABNORMAL
MICROSCOPIC COMMENT: ABNORMAL
NITRITE UR QL STRIP: POSITIVE
PH UR STRIP: 6 [PH] (ref 5–8)
PROT UR QL STRIP: ABNORMAL
RBC #/AREA URNS HPF: 4 /HPF (ref 0–4)
SP GR UR STRIP: 1.01 (ref 1–1.03)
SQUAMOUS #/AREA URNS HPF: 8 /HPF
URN SPEC COLLECT METH UR: ABNORMAL
WBC #/AREA URNS HPF: >100 /HPF (ref 0–5)
WBC CLUMPS URNS QL MICRO: ABNORMAL

## 2018-08-08 RX ORDER — CEPHALEXIN 500 MG/1
500 CAPSULE ORAL EVERY 12 HOURS
Qty: 14 CAPSULE | Refills: 0 | Status: SHIPPED | OUTPATIENT
Start: 2018-08-08 | End: 2018-08-20

## 2018-08-14 ENCOUNTER — TELEPHONE (OUTPATIENT)
Dept: FAMILY MEDICINE | Facility: CLINIC | Age: 83
End: 2018-08-14

## 2018-08-14 ENCOUNTER — OFFICE VISIT (OUTPATIENT)
Dept: FAMILY MEDICINE | Facility: CLINIC | Age: 83
End: 2018-08-14
Payer: MEDICARE

## 2018-08-14 VITALS
HEART RATE: 65 BPM | TEMPERATURE: 99 F | SYSTOLIC BLOOD PRESSURE: 155 MMHG | HEIGHT: 62 IN | DIASTOLIC BLOOD PRESSURE: 70 MMHG | BODY MASS INDEX: 30.54 KG/M2

## 2018-08-14 DIAGNOSIS — J20.9 BRONCHITIS WITH BRONCHOSPASM: Primary | ICD-10-CM

## 2018-08-14 DIAGNOSIS — N39.0 URINARY TRACT INFECTION WITHOUT HEMATURIA, SITE UNSPECIFIED: Primary | ICD-10-CM

## 2018-08-14 PROCEDURE — 99213 OFFICE O/P EST LOW 20 MIN: CPT | Mod: S$PBB,,, | Performed by: NURSE PRACTITIONER

## 2018-08-14 PROCEDURE — 99999 PR PBB SHADOW E&M-EST. PATIENT-LVL V: CPT | Mod: PBBFAC,,, | Performed by: NURSE PRACTITIONER

## 2018-08-14 PROCEDURE — 99215 OFFICE O/P EST HI 40 MIN: CPT | Mod: PBBFAC,PO | Performed by: NURSE PRACTITIONER

## 2018-08-14 RX ORDER — DOXYCYCLINE 100 MG/1
100 CAPSULE ORAL 2 TIMES DAILY
Qty: 20 CAPSULE | Refills: 0 | Status: SHIPPED | OUTPATIENT
Start: 2018-08-14 | End: 2018-08-24

## 2018-08-14 RX ORDER — DEXAMETHASONE 0.5 MG/5ML
SOLUTION ORAL
COMMUNITY
Start: 2018-08-07 | End: 2018-01-01

## 2018-08-14 RX ORDER — LIDOCAINE HYDROCHLORIDE 20 MG/ML
SOLUTION ORAL; TOPICAL
COMMUNITY
Start: 2018-08-07 | End: 2018-01-01

## 2018-08-14 NOTE — PROGRESS NOTES
Subjective:       Patient ID: Fiorella Connelly is a 89 y.o. female.    Chief Complaint: Chest Congestion    Cough   The current episode started 1 to 4 weeks ago. The problem has been gradually worsening. The problem occurs every few minutes. The cough is non-productive. Associated symptoms include shortness of breath and wheezing. Pertinent negatives include no chest pain, ear pain, fever, headaches, myalgias, rash or sore throat. She has tried prescription cough suppressant and oral steroids for the symptoms. The treatment provided no relief.       Review of Systems   Constitutional: Negative for fatigue, fever and unexpected weight change.   HENT: Negative for ear pain and sore throat.    Eyes: Negative for pain and visual disturbance.   Respiratory: Positive for cough, shortness of breath and wheezing.    Cardiovascular: Negative for chest pain and palpitations.   Gastrointestinal: Negative for abdominal pain, diarrhea and vomiting.   Musculoskeletal: Negative for arthralgias and myalgias.   Skin: Negative for color change and rash.   Neurological: Negative for dizziness and headaches.   Psychiatric/Behavioral: Negative for dysphoric mood and sleep disturbance. The patient is not nervous/anxious.        Vitals:    08/14/18 1548   BP: (!) 155/70   Pulse: 65   Temp:        Objective:     Current Outpatient Medications   Medication Sig Dispense Refill    acetaminophen (TYLENOL) 500 MG tablet Take 500 mg by mouth every 6 (six) hours as needed for Pain.      amLODIPine (NORVASC) 5 MG tablet TAKE 1 TABLET (5 MG TOTAL) BY MOUTH ONCE DAILY. 30 tablet 12    aspirin 81 MG Chew Take 81 mg by mouth once daily.      CALCIUM CARBONATE/VITAMIN D3 (CALCIUM 600 + D,3, ORAL) Take by mouth.      cephALEXin (KEFLEX) 500 MG capsule Take 1 capsule (500 mg total) by mouth every 12 (twelve) hours. 14 capsule 0    cloNIDine (CATAPRES) 0.2 MG tablet TAKE 1 TABLET BY MOUTH EVERY DAY 90 tablet 12    dexamethasone 0.5 mg/5 mL Soln        folic acid (FOLVITE) 1 MG tablet Take 1 tablet (1,000 mcg total) by mouth once daily. 30 tablet 12    gabapentin (NEURONTIN) 300 MG capsule TAKE ONE CAPSULE BY MOUTH EVERY MORNING, THEN 2 CAPSULE EVERY EVENING 270 capsule 4    irbesartan-hydrochlorothiazide (AVALIDE) 300-12.5 mg per tablet Take 1 tablet by mouth once daily. 90 tablet 3    LIDOCAINE VISCOUS 2 % solution       loperamide (IMODIUM A-D) 2 mg Tab Take 2 mg by mouth 4 (four) times daily as needed.      loratadine (CLARITIN) 10 mg tablet TAKE 1 TABLET BY MOUTH EVERY DAY 90 tablet 3    LORazepam (ATIVAN) 1 MG tablet TAKE 1 TABLET BY MOUTH TWICE A DAY 60 tablet 5    megestrol (MEGACE) 40 MG Tab Take 1 tablet (40 mg total) by mouth once daily. 30 tablet 11    methotrexate 2.5 MG Tab TAKE 8 TABLETS BY MOUTH EVERY 7 DAYS. 40 tablet 12    metoprolol succinate (TOPROL-XL) 50 MG 24 hr tablet TAKE 1 TABLET BY MOUTH DAILY. 30 tablet 12    montelukast (SINGULAIR) 10 mg tablet Take 10 mg by mouth.      omeprazole (PRILOSEC) 20 MG capsule TAKE 1 CAPSULE BY MOUTH TWICE DAILY 180 capsule 3    oxybutynin (DITROPAN-XL) 5 MG TR24 Take 1 tablet (5 mg total) by mouth once daily. 30 tablet 11    predniSONE (DELTASONE) 20 MG tablet TAKE 1 TABLET BY MOUTH TWICE A DAY 60 tablet 6    psyllium (METAMUCIL) packet Take 1 packet by mouth once daily.      tobramycin-dexamethasone 0.3-0.1% (TOBRADEX) 0.3-0.1 % DrpS INSTILL ONE DROP INTO BOTH EYES 4 TIMES DAILY FOR 2 WEEKS  1    [START ON 8/18/2018] traMADol (ULTRAM) 50 mg tablet TAKE 2 TABLETS BY MOUTH 4 TIMES DAILY 240 tablet 2    doxycycline (MONODOX) 100 MG capsule Take 1 capsule (100 mg total) by mouth 2 (two) times daily. for 10 days 20 capsule 0    erythromycin (ROMYCIN) ophthalmic ointment PLACE ONE STRIP INTO BOTH EYE(S) EVERY NIGHT AT BEDTIME FOR 2 WEEKS  1    FOLIC ACID/MULTIVIT-MIN/LUTEIN (CENTRUM SILVER ORAL) Take by mouth.       No current facility-administered medications for this visit.         Physical Exam   Constitutional: She is oriented to person, place, and time. She appears well-developed and well-nourished. No distress.   HENT:   Head: Normocephalic and atraumatic.   Right Ear: Tympanic membrane is bulging.   Left Ear: Tympanic membrane normal.   Nose: Nose normal.   Mouth/Throat: Posterior oropharyngeal edema present.   Eyes: EOM are normal. Pupils are equal, round, and reactive to light.   Neck: Normal range of motion. Neck supple.   Cardiovascular: Normal rate and regular rhythm.   Pulmonary/Chest: Effort normal. She has decreased breath sounds. She has wheezes.   Dry cough   Musculoskeletal: Normal range of motion.   Neurological: She is alert and oriented to person, place, and time.   Skin: Skin is warm and dry. No rash noted.   Psychiatric: She has a normal mood and affect. Judgment normal.   Nursing note and vitals reviewed.      Assessment:       1. Bronchitis with bronchospasm        Plan:   Bronchitis with bronchospasm    Other orders  -     doxycycline (MONODOX) 100 MG capsule; Take 1 capsule (100 mg total) by mouth 2 (two) times daily. for 10 days  Dispense: 20 capsule; Refill: 0        Follow-up if symptoms worsen or fail to improve.    Patient Instructions   Over the counter Mucinex DM twice daily for congestion and cough

## 2018-08-17 RX ORDER — METHOTREXATE 2.5 MG/1
TABLET ORAL
Qty: 40 TABLET | Refills: 12 | Status: SHIPPED | OUTPATIENT
Start: 2018-08-17 | End: 2018-01-01 | Stop reason: SDUPTHER

## 2018-08-20 ENCOUNTER — OFFICE VISIT (OUTPATIENT)
Dept: FAMILY MEDICINE | Facility: CLINIC | Age: 83
End: 2018-08-20
Payer: MEDICARE

## 2018-08-20 ENCOUNTER — HOSPITAL ENCOUNTER (OUTPATIENT)
Dept: RADIOLOGY | Facility: HOSPITAL | Age: 83
Discharge: HOME | End: 2018-08-20
Attending: FAMILY MEDICINE
Payer: MEDICARE

## 2018-08-20 VITALS
OXYGEN SATURATION: 96 % | BODY MASS INDEX: 30.91 KG/M2 | TEMPERATURE: 98 F | HEIGHT: 62 IN | WEIGHT: 168 LBS | DIASTOLIC BLOOD PRESSURE: 69 MMHG | HEART RATE: 65 BPM | SYSTOLIC BLOOD PRESSURE: 126 MMHG

## 2018-08-20 DIAGNOSIS — J40 BRONCHITIS: ICD-10-CM

## 2018-08-20 DIAGNOSIS — J40 BRONCHITIS: Primary | ICD-10-CM

## 2018-08-20 PROCEDURE — 71046 X-RAY EXAM CHEST 2 VIEWS: CPT | Mod: 26,,, | Performed by: RADIOLOGY

## 2018-08-20 PROCEDURE — 99214 OFFICE O/P EST MOD 30 MIN: CPT | Mod: PBBFAC,PO | Performed by: FAMILY MEDICINE

## 2018-08-20 PROCEDURE — 71046 X-RAY EXAM CHEST 2 VIEWS: CPT | Mod: TC,PO

## 2018-08-20 PROCEDURE — 99213 OFFICE O/P EST LOW 20 MIN: CPT | Mod: S$PBB,,, | Performed by: FAMILY MEDICINE

## 2018-08-20 PROCEDURE — 99999 PR PBB SHADOW E&M-EST. PATIENT-LVL IV: CPT | Mod: PBBFAC,,, | Performed by: FAMILY MEDICINE

## 2018-08-20 RX ORDER — LEVOFLOXACIN 500 MG/1
500 TABLET, FILM COATED ORAL DAILY
Qty: 10 TABLET | Refills: 0 | Status: SHIPPED | OUTPATIENT
Start: 2018-08-20 | End: 2018-01-01

## 2018-08-20 RX ORDER — NYSTATIN 100000 [USP'U]/ML
4 SUSPENSION ORAL 4 TIMES DAILY
Qty: 160 ML | Refills: 0 | Status: SHIPPED | OUTPATIENT
Start: 2018-08-20 | End: 2018-01-01 | Stop reason: SDUPTHER

## 2018-08-20 NOTE — PROGRESS NOTES
Fiorella Connelly presents with moderate upper respiratory congestion,rhinnorhea,moderate cough past 2-3 days. OTC help slightly. Denies nausea,vomiting,diarrhea or significant fever.    Past Medical History:   Diagnosis Date    Acute pancreatitis     told chronic pancreatitis prior to diagnosis of gallstone and cholecystectomy    Fibromyalgia     GERD (gastroesophageal reflux disease)     Hypertension     Polymyalgia rheumatica     Rheumatoid arthritis     rheumatoid     Past Surgical History:   Procedure Laterality Date    APPENDECTOMY      CARPAL TUNNEL RELEASE Bilateral     CHOLECYSTECTOMY      COLONOSCOPY  ~2010    normal findings per patient report    EYE SURGERY Bilateral     cataracts    FOOT SURGERY Bilateral     HIP FRACTURE SURGERY  09/2017    HYSTERECTOMY      ovaries remain    UPPER GASTROINTESTINAL ENDOSCOPY  ~2010    with dilation     Review of patient's allergies indicates:   Allergen Reactions    Xanax [alprazolam] Swelling    Cetirizine Other (See Comments)     hallucinations    Nsaids (non-steroidal anti-inflammatory drug)     Shellfish containing products Swelling    Sulfa (sulfonamide antibiotics) Nausea And Vomiting    Chlorhexidine Rash     Current Outpatient Medications on File Prior to Visit   Medication Sig Dispense Refill    acetaminophen (TYLENOL) 500 MG tablet Take 500 mg by mouth every 6 (six) hours as needed for Pain.      aspirin 81 MG Chew Take 81 mg by mouth once daily.      CALCIUM CARBONATE/VITAMIN D3 (CALCIUM 600 + D,3, ORAL) Take by mouth.      cloNIDine (CATAPRES) 0.2 MG tablet TAKE 1 TABLET BY MOUTH EVERY DAY 90 tablet 12    dexamethasone 0.5 mg/5 mL Soln       doxycycline (MONODOX) 100 MG capsule Take 1 capsule (100 mg total) by mouth 2 (two) times daily. for 10 days 20 capsule 0    erythromycin (ROMYCIN) ophthalmic ointment PLACE ONE STRIP INTO BOTH EYE(S) EVERY NIGHT AT BEDTIME FOR 2 WEEKS  1    folic acid (FOLVITE) 1 MG tablet Take 1 tablet  (1,000 mcg total) by mouth once daily. 30 tablet 12    FOLIC ACID/MULTIVIT-MIN/LUTEIN (CENTRUM SILVER ORAL) Take by mouth.      gabapentin (NEURONTIN) 300 MG capsule TAKE ONE CAPSULE BY MOUTH EVERY MORNING, THEN 2 CAPSULE EVERY EVENING 270 capsule 4    irbesartan-hydrochlorothiazide (AVALIDE) 300-12.5 mg per tablet Take 1 tablet by mouth once daily. 90 tablet 3    LIDOCAINE VISCOUS 2 % solution       loperamide (IMODIUM A-D) 2 mg Tab Take 2 mg by mouth 4 (four) times daily as needed.      loratadine (CLARITIN) 10 mg tablet TAKE 1 TABLET BY MOUTH EVERY DAY 90 tablet 3    LORazepam (ATIVAN) 1 MG tablet TAKE 1 TABLET BY MOUTH TWICE A DAY 60 tablet 5    megestrol (MEGACE) 40 MG Tab Take 1 tablet (40 mg total) by mouth once daily. 30 tablet 11    methotrexate 2.5 MG Tab TAKE 8 TABLETS BY MOUTH EVERY 7 DAYS. 40 tablet 12    metoprolol succinate (TOPROL-XL) 50 MG 24 hr tablet TAKE 1 TABLET BY MOUTH DAILY. 30 tablet 12    montelukast (SINGULAIR) 10 mg tablet Take 10 mg by mouth.      omeprazole (PRILOSEC) 20 MG capsule TAKE 1 CAPSULE BY MOUTH TWICE DAILY 180 capsule 3    oxybutynin (DITROPAN-XL) 5 MG TR24 Take 1 tablet (5 mg total) by mouth once daily. 30 tablet 11    predniSONE (DELTASONE) 20 MG tablet TAKE 1 TABLET BY MOUTH TWICE A DAY (Patient taking differently: 25 mg once daily) 60 tablet 6    psyllium (METAMUCIL) packet Take 1 packet by mouth once daily.      tobramycin-dexamethasone 0.3-0.1% (TOBRADEX) 0.3-0.1 % DrpS INSTILL ONE DROP INTO BOTH EYES 4 TIMES DAILY FOR 2 WEEKS  1    traMADol (ULTRAM) 50 mg tablet TAKE 2 TABLETS BY MOUTH 4 TIMES DAILY 240 tablet 2    [DISCONTINUED] cephALEXin (KEFLEX) 500 MG capsule Take 1 capsule (500 mg total) by mouth every 12 (twelve) hours. 14 capsule 0    amLODIPine (NORVASC) 5 MG tablet TAKE 1 TABLET (5 MG TOTAL) BY MOUTH ONCE DAILY. 30 tablet 12     No current facility-administered medications on file prior to visit.      Social History     Socioeconomic  History    Marital status:      Spouse name: Not on file    Number of children: Not on file    Years of education: Not on file    Highest education level: Not on file   Social Needs    Financial resource strain: Not on file    Food insecurity - worry: Not on file    Food insecurity - inability: Not on file    Transportation needs - medical: Not on file    Transportation needs - non-medical: Not on file   Occupational History    Not on file   Tobacco Use    Smoking status: Never Smoker    Smokeless tobacco: Never Used   Substance and Sexual Activity    Alcohol use: No    Drug use: Not on file    Sexual activity: Not on file   Other Topics Concern    Not on file   Social History Narrative    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  EYES: Visual acuity fine. No photophobia, ocular pain or diplopia.EARS: Denies ear pain, discharge or vertigo.NOSE: No loss of smell, no epistaxis some postnasal drip.MOUTH & THROAT: No hoarseness or change in voice. No excessive gum bleeding.CHEST: Denies DURAN, cyanosis, wheezing  CARDIOVASCULAR: Denies chest pain, PND, orthopnea or reduced exercise tolerance.  ABDOMEN:  No weight loss.No abdominal pain, no hematemesis or blood in stool.  URINARY: No flank pain, dysuria or hematuria.  PERIPHERAL VASCULAR: No claudication or cyanosis.  MUSCULOSKELETAL: Negative   NEUROLOGIC: No history of seizures, paralysis, alteration of gait or coordination.    PE: Vital signs as noted  Heent:Normocephalic with no recent cranial trauma,PERRLA,EOMI,conjunctiva clear,fundi reveal no hemmorhage exudate or  papilledema.Otic canals clear, tympanic membranes slightly dull bilaterally.Nasal mucosa slightly red and edematous.Posterior pharynx slightly red but without exudate.  Neck:Supple with minimal anterior cervical adenopathy.  Chest:Clear bilateral breath sounds with mild scattered ronchi  Heart:Regular rhthym without murmer  Abdomen:Soft, non tender,no masses, no hepatosplenomegalyExtremeties and Neurologic:Grossly within normal limits  Impression: Bronchitis vs early Pn   Plan: Lab  Pulse ox   CXR  Levaquin 500 qd

## 2018-08-21 ENCOUNTER — TELEPHONE (OUTPATIENT)
Dept: FAMILY MEDICINE | Facility: CLINIC | Age: 83
End: 2018-08-21

## 2018-08-21 DIAGNOSIS — I10 HYPERTENSION, UNSPECIFIED TYPE: ICD-10-CM

## 2018-08-21 DIAGNOSIS — M06.9 RHEUMATOID ARTHRITIS, INVOLVING UNSPECIFIED SITE, UNSPECIFIED RHEUMATOID FACTOR PRESENCE: Primary | ICD-10-CM

## 2018-08-21 DIAGNOSIS — J40 BRONCHITIS: ICD-10-CM

## 2018-09-04 ENCOUNTER — TELEPHONE (OUTPATIENT)
Dept: ADMINISTRATIVE | Facility: CLINIC | Age: 83
End: 2018-09-04

## 2018-09-10 ENCOUNTER — TELEPHONE (OUTPATIENT)
Dept: FAMILY MEDICINE | Facility: CLINIC | Age: 83
End: 2018-09-10

## 2018-09-10 NOTE — TELEPHONE ENCOUNTER
Called to let Dr. Hoover know that pt had a new skin tear on her forearm. They used standard wound care for it

## 2018-09-10 NOTE — TELEPHONE ENCOUNTER
----- Message from Dina Machuca sent at 9/10/2018  1:26 PM CDT -----  Lisa ( [email or web address] ) is requesting a call from nurse to for the pt skin care.          Please call Lisa ( [facility] ) back at [phone]

## 2018-09-19 ENCOUNTER — OFFICE VISIT (OUTPATIENT)
Dept: FAMILY MEDICINE | Facility: CLINIC | Age: 83
End: 2018-09-19
Payer: MEDICARE

## 2018-09-19 ENCOUNTER — LAB VISIT (OUTPATIENT)
Dept: LAB | Facility: HOSPITAL | Age: 83
End: 2018-09-19
Attending: FAMILY MEDICINE
Payer: MEDICARE

## 2018-09-19 VITALS
WEIGHT: 167 LBS | SYSTOLIC BLOOD PRESSURE: 116 MMHG | HEIGHT: 62 IN | TEMPERATURE: 99 F | HEART RATE: 77 BPM | BODY MASS INDEX: 30.73 KG/M2 | DIASTOLIC BLOOD PRESSURE: 65 MMHG

## 2018-09-19 DIAGNOSIS — K52.9 GASTROENTERITIS: Primary | ICD-10-CM

## 2018-09-19 DIAGNOSIS — K13.0 LESION OF LIP: ICD-10-CM

## 2018-09-19 DIAGNOSIS — K52.9 GASTROENTERITIS: ICD-10-CM

## 2018-09-19 LAB
ANION GAP SERPL CALC-SCNC: 15 MMOL/L
ANISOCYTOSIS BLD QL SMEAR: SLIGHT
BASOPHILS # BLD AUTO: 0.05 K/UL
BASOPHILS NFR BLD: 0.4 %
BUN SERPL-MCNC: 16 MG/DL
CALCIUM SERPL-MCNC: 9.4 MG/DL
CHLORIDE SERPL-SCNC: 103 MMOL/L
CO2 SERPL-SCNC: 22 MMOL/L
CREAT SERPL-MCNC: 0.8 MG/DL
DIFFERENTIAL METHOD: ABNORMAL
EOSINOPHIL # BLD AUTO: 0 K/UL
EOSINOPHIL NFR BLD: 0.3 %
ERYTHROCYTE [DISTWIDTH] IN BLOOD BY AUTOMATED COUNT: 17.2 %
EST. GFR  (AFRICAN AMERICAN): >60 ML/MIN/1.73 M^2
EST. GFR  (NON AFRICAN AMERICAN): >60 ML/MIN/1.73 M^2
GLUCOSE SERPL-MCNC: 161 MG/DL
HCT VFR BLD AUTO: 37.3 %
HGB BLD-MCNC: 11.7 G/DL
HYPOCHROMIA BLD QL SMEAR: ABNORMAL
LYMPHOCYTES # BLD AUTO: 1 K/UL
LYMPHOCYTES NFR BLD: 8.3 %
MCH RBC QN AUTO: 31.9 PG
MCHC RBC AUTO-ENTMCNC: 31.4 G/DL
MCV RBC AUTO: 102 FL
MONOCYTES # BLD AUTO: 0.8 K/UL
MONOCYTES NFR BLD: 6.7 %
NEUTROPHILS # BLD AUTO: 10.1 K/UL
NEUTROPHILS NFR BLD: 84.3 %
PLATELET # BLD AUTO: 364 K/UL
PLATELET BLD QL SMEAR: ABNORMAL
PMV BLD AUTO: 8.9 FL
POTASSIUM SERPL-SCNC: 3.3 MMOL/L
RBC # BLD AUTO: 3.67 M/UL
SODIUM SERPL-SCNC: 140 MMOL/L
WBC # BLD AUTO: 11.95 K/UL

## 2018-09-19 PROCEDURE — 85025 COMPLETE CBC W/AUTO DIFF WBC: CPT | Mod: PO

## 2018-09-19 PROCEDURE — 99999 PR PBB SHADOW E&M-EST. PATIENT-LVL IV: CPT | Mod: PBBFAC,,, | Performed by: NURSE PRACTITIONER

## 2018-09-19 PROCEDURE — 99213 OFFICE O/P EST LOW 20 MIN: CPT | Mod: S$PBB,,, | Performed by: NURSE PRACTITIONER

## 2018-09-19 PROCEDURE — 80048 BASIC METABOLIC PNL TOTAL CA: CPT

## 2018-09-19 PROCEDURE — 99214 OFFICE O/P EST MOD 30 MIN: CPT | Mod: PBBFAC,PO | Performed by: NURSE PRACTITIONER

## 2018-09-19 PROCEDURE — 36415 COLL VENOUS BLD VENIPUNCTURE: CPT | Mod: PO

## 2018-09-19 RX ORDER — ACYCLOVIR 400 MG/1
400 TABLET ORAL
COMMUNITY
Start: 2018-03-23 | End: 2018-09-19

## 2018-09-19 RX ORDER — CYCLOBENZAPRINE HCL 5 MG
5 TABLET ORAL
COMMUNITY
Start: 2018-01-26 | End: 2018-01-01

## 2018-09-19 NOTE — PROGRESS NOTES
"Subjective:      Patient ID: Fiorella Connelly is a 89 y.o. female.    Chief Complaint: Chills and Fever    HPI   Patient to clinic with complaint of subjective fever, chills, fatigue and diarrhea onset Monday.  Symptoms have been waxing and waning since then.  She has not had any documented fever.  She was treated with levaquin for pneumonia one month ago.  She denies URI symptoms.  She reports she has chronic, intermittent diarrhea due to IBS.  She has not had any nausea or vomiting.  She is not sure if she has had blood in her stool, she has not looked at it.  She has had abdominal cramping with the diarrhea.  Diarrhea has improved with use of Imodium.  She also complains of a chronic left upper lip lesion for over 1 year.    Review of Systems   Constitutional: Positive for chills, fatigue and fever (Subjective).   HENT: Negative for congestion, postnasal drip, rhinorrhea, sinus pressure and sinus pain.    Respiratory: Negative for cough, shortness of breath and wheezing.    Cardiovascular: Negative for chest pain, palpitations and leg swelling.   Gastrointestinal: Positive for abdominal pain and diarrhea. Negative for abdominal distention, constipation, nausea and vomiting.   Skin: Positive for wound. Negative for rash.   Neurological: Negative for weakness and numbness.   Psychiatric/Behavioral: The patient is not nervous/anxious.        Objective:     /65   Pulse 77   Temp 98.5 °F (36.9 °C) (Oral)   Ht 5' 2" (1.575 m)   Wt 75.8 kg (167 lb)   BMI 30.54 kg/m²     Physical Exam   Constitutional: She is oriented to person, place, and time. She appears well-developed and well-nourished.   HENT:   Head: Normocephalic.   Eyes: Pupils are equal, round, and reactive to light.   Neck: Normal range of motion. Neck supple.   Cardiovascular: Normal rate, regular rhythm and normal heart sounds.   Pulmonary/Chest: Effort normal and breath sounds normal. No respiratory distress. She has no decreased breath sounds. " She has no wheezes. She has no rhonchi. She has no rales.   Abdominal: Soft. Bowel sounds are normal. She exhibits no distension and no mass. There is generalized tenderness (Mild generalized tenderness to abdomen). There is no rebound and no guarding.   Neurological: She is alert and oriented to person, place, and time.   Skin: Skin is warm and dry. No rash noted.   Scabbed lesion to left upper lip   Psychiatric: She has a normal mood and affect. Her behavior is normal. Judgment and thought content normal.   Vitals reviewed.    Assessment:     1. Gastroenteritis    2. Lesion of lip        Plan:     Problem List Items Addressed This Visit     None      Visit Diagnoses     Gastroenteritis    -  Primary    Relevant Orders    Occult blood x 1, stool    WBC, Stool    Stool culture    Giardia / Cryptosporidum, EIA    Stool Exam-Ova,Cysts,Parasites    Rotavirus antigen, stool    Clostridium difficile EIA    Special contact c diff isolation status    Basic metabolic panel    CBC auto differential    Lesion of lip        Relevant Orders    Ambulatory referral to Dermatology      Discussed with patient warning signs to report to ER for.  Clear liquids and electrolyte replacement today, advanced to a bland diet tomorrow and advance as tolerated  Caution to over use of Imodium due to possible constipation  Report to ER if symptoms worsen    Follow-up if symptoms worsen or fail to improve.        Parts of this note was dictated using voice recognition software. Please excuse any grammatical or typographical errors.

## 2018-10-03 ENCOUNTER — OFFICE VISIT (OUTPATIENT)
Dept: OTOLARYNGOLOGY | Facility: CLINIC | Age: 83
End: 2018-10-03
Payer: MEDICARE

## 2018-10-03 VITALS — HEIGHT: 62 IN | BODY MASS INDEX: 29.21 KG/M2 | WEIGHT: 158.75 LBS

## 2018-10-03 DIAGNOSIS — L98.9 EXTERNAL NASAL LESION: ICD-10-CM

## 2018-10-03 DIAGNOSIS — D49.0: Primary | ICD-10-CM

## 2018-10-03 DIAGNOSIS — K14.8 TONGUE LESION: ICD-10-CM

## 2018-10-03 PROCEDURE — 88342 IMHCHEM/IMCYTCHM 1ST ANTB: CPT | Mod: 26,,, | Performed by: PATHOLOGY

## 2018-10-03 PROCEDURE — 99214 OFFICE O/P EST MOD 30 MIN: CPT | Mod: 25,S$PBB,, | Performed by: OTOLARYNGOLOGY

## 2018-10-03 PROCEDURE — 11100 PR BIOPSY OF SKIN LESION: CPT | Mod: PBBFAC,PO | Performed by: OTOLARYNGOLOGY

## 2018-10-03 PROCEDURE — 88305 TISSUE EXAM BY PATHOLOGIST: CPT | Mod: 59 | Performed by: PATHOLOGY

## 2018-10-03 PROCEDURE — 41100 BIOPSY OF TONGUE: CPT | Mod: S$PBB,,, | Performed by: OTOLARYNGOLOGY

## 2018-10-03 PROCEDURE — 99999 PR PBB SHADOW E&M-EST. PATIENT-LVL III: CPT | Mod: PBBFAC,,, | Performed by: OTOLARYNGOLOGY

## 2018-10-03 PROCEDURE — 11101 PR BIOPSY, EACH ADDED LESION: CPT | Mod: S$PBB,,, | Performed by: OTOLARYNGOLOGY

## 2018-10-03 PROCEDURE — 41100 BIOPSY OF TONGUE: CPT | Mod: PBBFAC,PO | Performed by: OTOLARYNGOLOGY

## 2018-10-03 PROCEDURE — 11100 PR BIOPSY OF SKIN LESION: CPT | Mod: S$PBB,51,, | Performed by: OTOLARYNGOLOGY

## 2018-10-03 PROCEDURE — 99213 OFFICE O/P EST LOW 20 MIN: CPT | Mod: PBBFAC,PO,25 | Performed by: OTOLARYNGOLOGY

## 2018-10-04 NOTE — PROGRESS NOTES
Subjective:       Patient ID: Fiorella Connelly is a 89 y.o. female.    Chief Complaint: sore on lip and sores in nose    Fiorella is here for follow-up of persistent lesions on upper left lip, nasal vestibule. I saw her back in April and at the time the lesions were improving. She and daughter report that although they improved, never disappeared and since have gotten worse again. She did not represent until today. The lesions are still over the same location and continue to crust. She is applying moisturizer to them with no improvement.    She also has developed a lesion on the right lat tongue that dentist recommended biopsy of. She has some ulcers along her mandibular gingiva where she wears a partial.     Review of Systems   Constitutional: Negative for activity change and appetite change.   Respiratory: Negative for difficulty breathing and wheezing   Cardiovascular: Negative for chest pain.      Objective:        Constitutional:   Vital signs are normal. She appears well-developed and well-nourished.     Head:  Normocephalic and atraumatic.     Ears:  Hearing normal to normal and whispered voice; external ear normal without scars, lesions, or masses; ear canal, tympanic membrane, and middle ear normal..     Nose:          Mouth/Throat              Tests / Results:  Patient: Fiorella Connelly [id number], :1928  Procedure date:10/3/2018  Patient's medications, allergies, past medical, surgical, social and family histories were reviewed and updated as appropriate.    Chief Complaint:  sore on lip and sores in nose    HPI:  Fiorella is a 89 y.o. female with the history of present illness as discussed in the clinic note from today.    1. Biopsy of upper lip (skin)  2. Biopsy of nasal vestibule  3. Biopsy of right anterior tongue, right    Procedure: Risks, benefits, and alternatives of the procedure were discussed with the patient, and the patient consented to the biopsy of the left upper lip, left nasal vestibule/sill,  and right oral tongue lesion.  The areas were visualized with proper lighting and exposure.  Adequate anesthesia was obtained using 1% Lidocaine with 1:100,000 Epinephrine.  I biopsied each area with a punch biopsy, incisional in nature. The upper lip and tongue were done with a 4 mm punch. The nasal vestibule was done with a 2 mm punch. The oral cavity biopsy was closed with 4-0 gut; the others were not. Hemostasis was achieved with silver nitrate..  The patient tolerated the procedure well with no complications.           Assessment:       1. Neoplasm of upper lip    2. External nasal lesion    3. Tongue lesion          Plan:         Lesions have remained persistent -failed multiple topical therapies, oral anti-virals, and she is on chronic prednisone.  Biopsy today of all 3 lesions  Will call with results, consider derm or rheum referral if negative for malignancy

## 2018-10-09 ENCOUNTER — TELEPHONE (OUTPATIENT)
Dept: OTOLARYNGOLOGY | Facility: CLINIC | Age: 83
End: 2018-10-09

## 2018-10-09 NOTE — TELEPHONE ENCOUNTER
----- Message from Dalila Smith sent at 10/9/2018  3:12 PM CDT -----  Contact: [facility], Jodi Zapata want to speak with a nurse regarding patient biopsy results please call back at [phone]

## 2018-10-11 RX ORDER — ACYCLOVIR 400 MG/1
400 TABLET ORAL 3 TIMES DAILY
Qty: 42 TABLET | Refills: 0 | Status: SHIPPED | OUTPATIENT
Start: 2018-10-11 | End: 2018-01-01

## 2018-10-11 NOTE — TELEPHONE ENCOUNTER
----- Message from Caleb Lauren sent at 10/11/2018  9:07 AM CDT -----  Type:  Test Results    Who Called:Maria Antonia - [phone]  Name of Test (Lab/Mammo/Etc):  Biopsy   Date of Test:  10/03/2018Ordering Provider:  Dr Nolasco Where the test was performed:  [facility]   Best Call Back Number:    Additional Information:

## 2018-10-15 NOTE — TELEPHONE ENCOUNTER
----- Message from Fady Nolasco MD sent at 10/11/2018  3:32 PM CDT -----  Can you set a 2 week fu with me?   I sent medication for her and spoke with her. benign

## 2018-10-20 RX ORDER — MONTELUKAST SODIUM 10 MG/1
TABLET ORAL
Qty: 30 TABLET | Refills: 12 | Status: SHIPPED | OUTPATIENT
Start: 2018-10-20 | End: 2018-01-01 | Stop reason: SDUPTHER

## 2018-10-30 NOTE — PROGRESS NOTES
Subjective:       Patient ID: Fiorella Connelly is a 89 y.o. female.    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 She is doing better on oral anti-viral. Lesions have completely resolved. She has some mild discharge from her eye in the am which she is concerned about.    Review of Systems   Constitutional: Negative for activity change and appetite change.   Respiratory: Negative for difficulty breathing and wheezing   Cardiovascular: Negative for chest pain.      Objective:        Constitutional:   Vital signs are normal. She appears well-developed and well-nourished.   Eyes no conjunctival erythema or lesions. No crusting or discharge     Head:  Normocephalic and atraumatic.     Ears:  Hearing normal to normal and whispered voice; external ear normal without scars, lesions, or masses; ear canal, tympanic membrane, and middle ear normal..     Nose:  Nose normal including turbinates, nasal mucosa, sinuses and nasal septum.   Nearly healed left upper lip and nasal vestibule herpetic lesion        Tests / Results:  None    Assessment:       1. Herpetic lesions of face          Plan:         Lesions improved on oral anti-viral. Monitor for now. Discussed watchful waiting. If recurrent they will let me know and we will do another oral vs. Topical anti-viral.  Eye likely not related but consider saline prn and Ophtho if not improved.

## 2018-11-01 NOTE — PATIENT INSTRUCTIONS
Discharge Instructions for Cellulitis  You have been diagnosed with cellulitis. This is an infection in the deepest layer of the skin. In some cases, the infection also affects the muscle. Cellulitis is caused by bacteria. The bacteria can enter the body through broken skin. This can happen with a cut, scratch, animal bite, or an insect bite that has been scratched. You may have been treated in the hospital with antibiotics and fluids. You will likely be given a prescription for antibiotics to take at home. This sheet will help you take care of yourself at home.  Home care  When you are home:  · Take the prescribed antibiotic medicine you are given as directed until it is gone. Take it even if you feel better. It treats the infection and stops it from returning. Not taking all the medicine can make future infections hard to treat.  · Keep the infected area clean.  · When possible, raise the infected area above the level of your heart. This helps keep swelling down.  · Talk with your healthcare provider if you are in pain. Ask what kind of over-the-counter medicine you can take for pain.  · Apply clean bandages as advised.  · Take your temperature once a day for a week.  · Wash your hands often to prevent spreading the infection.  In the future, wash your hands before and after you touch cuts, scratches, or bandages. This will help prevent infection.   When to call your healthcare provider  Call your healthcare provider immediately if you have any of the following:  · Difficulty or pain when moving the joints above or below the infected area  · Discharge or pus draining from the area  · Fever of 100.4°F (38°C) or higher, or as directed by your healthcare provider  · Pain that gets worse in or around the infected   · Redness that gets worse in or around the infected area, particularly if the area of redness expands to a wider area  · Shaking chills  · Swelling of the infected area  · Vomiting   Date Last Reviewed:  8/1/2016  © 9205-0540 [employer], [employer]. [address] [place] [address], [place] [address]. All rights reserved. This information is not intended as a substitute for professional medical care. Always follow your healthcare professional's instructions.

## 2018-11-01 NOTE — PROGRESS NOTES
"Subjective:      Patient ID: Fiorella Connelly is a 89 y.o. female.    Chief Complaint: Elbow Injury    HPI   Left elbow pain and swelling onset day before yesterday and worsening now.  She reports she noticed that her elbow was painful to the touch and then noted some swelling and redness.  It is very tender to touch.  She denies drainage.  She does report that she has not had much energy today but has not run fever.  There is no drainage.  She cannot identify any exacerbating or mitigating factors.    Review of Systems   Constitutional: Negative for chills, fatigue and fever.   Respiratory: Negative for cough, shortness of breath and wheezing.    Cardiovascular: Negative for chest pain, palpitations and leg swelling.   Musculoskeletal: Positive for arthralgias and joint swelling.   Skin: Positive for wound. Negative for rash.   Neurological: Negative for weakness and numbness.   Psychiatric/Behavioral: The patient is not nervous/anxious.        Objective:     /70   Pulse 64   Temp 98.3 °F (36.8 °C)   Ht 5' 2" (1.575 m)   Wt 71.7 kg (158 lb)   BMI 28.90 kg/m²     Physical Exam   Constitutional: She is oriented to person, place, and time. She appears well-developed and well-nourished.   HENT:   Head: Normocephalic.   Eyes: Pupils are equal, round, and reactive to light.   Neck: Normal range of motion. Neck supple.   Cardiovascular: Normal rate, regular rhythm and normal heart sounds.   Pulmonary/Chest: Effort normal and breath sounds normal. No respiratory distress. She has no decreased breath sounds. She has no wheezes. She has no rhonchi. She has no rales.   Musculoskeletal:        Left elbow: She exhibits swelling. She exhibits normal range of motion, no effusion, no deformity and no laceration. Tenderness found.        Arms:  Neurological: She is alert and oriented to person, place, and time.   Skin: Skin is warm and dry. No rash noted.        Psychiatric: She has a normal mood and affect. Her behavior " is normal. Judgment and thought content normal.   Vitals reviewed.    Assessment:     1. Cellulitis of left elbow        Plan:     Problem List Items Addressed This Visit     None      Visit Diagnoses     Cellulitis of left elbow    -  Primary    Relevant Medications    clindamycin (CLEOCIN) 300 MG capsule    Other Relevant Orders    CBC auto differential (Completed)      Marked erythema for visual indication if erythema is extending.  Patient to report to ER if she begins to feel more fatigued or run fever.  She will follow up with Dr. Hoover on Monday for re-evaluation and possible drainage.    Follow-up in about 4 days (around 11/5/2018), or if symptoms worsen or fail to improve.        Parts of this note was dictated using voice recognition software. Please excuse any grammatical or typographical errors.

## 2018-11-03 PROBLEM — R79.89 ELEVATED TROPONIN: Status: ACTIVE | Noted: 2018-01-01

## 2018-11-03 PROBLEM — Z79.60 LONG-TERM USE OF IMMUNOSUPPRESSANT MEDICATION: Status: ACTIVE | Noted: 2018-01-01

## 2018-11-03 PROBLEM — R11.2 NON-INTRACTABLE VOMITING WITH NAUSEA: Status: ACTIVE | Noted: 2018-01-01

## 2018-11-03 PROBLEM — L03.114 LEFT ARM CELLULITIS: Status: ACTIVE | Noted: 2018-01-01

## 2018-11-03 PROBLEM — R10.84 GENERALIZED ABDOMINAL PAIN: Status: ACTIVE | Noted: 2018-01-01

## 2018-11-03 PROBLEM — D72.829 LEUKOCYTOSIS: Status: ACTIVE | Noted: 2018-01-01

## 2018-11-03 PROBLEM — K56.609 SMALL BOWEL OBSTRUCTION: Status: ACTIVE | Noted: 2018-01-01

## 2018-11-04 PROBLEM — R79.89 ELEVATED TSH: Status: ACTIVE | Noted: 2018-01-01

## 2018-11-04 PROBLEM — N30.00 ACUTE CYSTITIS WITHOUT HEMATURIA: Status: ACTIVE | Noted: 2018-01-01

## 2018-11-11 PROBLEM — E86.0 ACUTE DEHYDRATION: Status: ACTIVE | Noted: 2018-01-01

## 2018-11-11 PROBLEM — R19.7 DIARRHEA: Status: ACTIVE | Noted: 2018-01-01

## 2018-11-27 NOTE — PROGRESS NOTES
"Subjective:      Patient ID: Fiorella Connelly is a 89 y.o. female.    Chief Complaint: Medication Refill    HPI   Patient to clinic for refill of medications.  She takes tramadol for chronic pain. She has a history of , she also has RA and low back pain radiating to bilateral knees and uses a wheelchair for long distances.  She also takes Ativan p.r.n. anxiety and has been on this for many years.  She tolerates the medication well.  She denies suicidal ideations.  Patient complains today of an ulcer in her mouth.  She reports has been there for a couple of days and is painful.  Patient also had 2 recent hospitalizations for 1st a small bowel obstruction, then for diarrhea.  She reports her symptoms have significantly improved.  She is having daily bowel movements and denies constipation, nausea, vomiting, fever.    Review of Systems   Constitutional: Negative for chills, fatigue and fever.   Respiratory: Negative for cough, shortness of breath and wheezing.    Cardiovascular: Negative for chest pain, palpitations and leg swelling.   Musculoskeletal: Positive for arthralgias and back pain.   Skin: Negative for rash and wound.   Neurological: Negative for weakness and numbness.   Psychiatric/Behavioral: The patient is not nervous/anxious.        Objective:     BP (!) 94/57   Pulse 70   Temp 97.3 °F (36.3 °C) (Oral)   Ht 5' 2" (1.575 m)   Wt 66.9 kg (147 lb 6.4 oz)   LMP  (LMP Unknown)   BMI 26.96 kg/m²     Physical Exam   Constitutional: She is oriented to person, place, and time. She appears well-developed and well-nourished.   HENT:   Head: Normocephalic.   Ulcer noted inside left lower lobe   Eyes: Pupils are equal, round, and reactive to light.   Neck: Normal range of motion. Neck supple.   Cardiovascular: Normal rate, regular rhythm and normal heart sounds.   Pulmonary/Chest: Effort normal and breath sounds normal. No respiratory distress. She has no decreased breath sounds. She has no wheezes. She has no " rhonchi. She has no rales.   Neurological: She is alert and oriented to person, place, and time.   Skin: Skin is warm and dry. No rash noted.   Psychiatric: She has a normal mood and affect. Her behavior is normal. Judgment and thought content normal.   Vitals reviewed.    Assessment:     1. Ulcer aphthous oral    2. Rheumatoid arthritis, involving unspecified site, unspecified rheumatoid factor presence    3. Anxiety        Plan:     Problem List Items Addressed This Visit        Psychiatric    Anxiety       Orthopedic    Rheumatoid arthritis      Other Visit Diagnoses     Ulcer aphthous oral    -  Primary      Tramadol and Ativan refilled per Dr. Hoover  Patient follow up as scheduled  Report to ER if symptoms worsen    Follow-up if symptoms worsen or fail to improve.        Parts of this note was dictated using voice recognition software. Please excuse any grammatical or typographical errors.

## 2018-12-05 NOTE — TELEPHONE ENCOUNTER
Home Health SOC 11/14/2018 - 01/12/2019 with [place] ([place] ) - Dr. Aris Hoover. Patient received SN, PT and OT services.

## 2018-12-12 NOTE — TELEPHONE ENCOUNTER
----- Message from Pauline Lauren sent at 12/12/2018  4:15 PM CST -----  Contact: [facility] (Jodi)  Calling in regards to loose bowels since yesterday and have been taking Immodium since yesterday and nothing has changed and pt has weakness and please advise [phone]

## 2018-12-12 NOTE — TELEPHONE ENCOUNTER
Spoke with Jodi RN at [facility] states patient has had non stop diarrhea since yesterday, has taken immodium 6 times today with no relief and is now very weak.  Instructed that patient go to ED for treatment, Jodi verbalized understanding

## 2018-12-17 PROBLEM — R11.2 NON-INTRACTABLE VOMITING WITH NAUSEA: Status: RESOLVED | Noted: 2018-01-01 | Resolved: 2018-01-01

## 2018-12-17 PROBLEM — D72.829 LEUKOCYTOSIS: Status: RESOLVED | Noted: 2018-01-01 | Resolved: 2018-01-01

## 2018-12-17 NOTE — PROGRESS NOTES
Transitional Care Note  Subjective:       Patient ID: Fiorella Connelly is a 89 y.o. female.  Chief Complaint: hospital f/u    Family and/or Caretaker present at visit?  Yes.  Diagnostic tests reviewed/disposition: No diagnosic tests pending after this hospitalization.  Disease/illness education: Diarrhea dehydration   Home health/community services discussion/referrals: Patient does not have home health established from hospital visit.  They do not need home health.  If needed, we will set up home health for the patient. Has private sitter   Establishment or re-establishment of referral orders for community resources: No other necessary community resources.   Discussion with other health care providers: No discussion with other health care providers necessary.   HPI  Review of Systems    Objective:      Physical Exam    Assessment:       No diagnosis found.    Plan:       See below         Past Medical History:  Past Medical History:   Diagnosis Date    Acute pancreatitis     told chronic pancreatitis prior to diagnosis of gallstone and cholecystectomy    Fibromyalgia     GERD (gastroesophageal reflux disease)     Hypertension     Polymyalgia rheumatica     Rheumatoid arthritis     rheumatoid     Past Surgical History:   Procedure Laterality Date    APPENDECTOMY      CARPAL TUNNEL RELEASE Bilateral     CHOLECYSTECTOMY      COLONOSCOPY  ~2010    normal findings per patient report    COLONOSCOPY N/A 4/30/2018    Procedure: COLONOSCOPY;  Surgeon: William Domingo Jr., MD;  Location: [facility];  Service: Endoscopy;  Laterality: N/A;    COLONOSCOPY N/A 4/30/2018    Performed by William [facility]ingo Jr., MD at [facility]    ESOPHAGOGASTRODUODENOSCOPY (EGD) N/A 4/30/2018    Performed by William [facility] Jr., [facility] at [facility]    EYE SURGERY Bilateral     cataracts    FOOT SURGERY Bilateral     HIP FRACTURE SURGERY  09/2017    HYSTERECTOMY      ovaries remain    UPPER GASTROINTESTINAL ENDOSCOPY  ~2010    with  dilation     Review of patient's allergies indicates:   Allergen Reactions    Xanax [alprazolam] Swelling    Cetirizine Other (See Comments)     hallucinations    Nsaids (non-steroidal anti-inflammatory drug)     Shellfish containing products Swelling    Sulfa (sulfonamide antibiotics) Nausea And Vomiting    Chlorhexidine Rash     Current Outpatient Medications on File Prior to Visit   Medication Sig Dispense Refill    acetaminophen (TYLENOL) 500 MG tablet Take 500 mg by mouth every 6 (six) hours as needed for Pain.      amLODIPine (NORVASC) 5 MG tablet TAKE 1 TABLET (5 MG TOTAL) BY MOUTH ONCE DAILY. 30 tablet 12    aspirin 81 MG Chew Take 81 mg by mouth once daily.      CALCIUM CARBONATE/VITAMIN D3 (CALCIUM 600 + D,3, ORAL) Take by mouth.      cloNIDine (CATAPRES) 0.2 MG tablet TAKE 1 TABLET BY MOUTH EVERY DAY 90 tablet 4    folic acid (FOLVITE) 1 MG tablet Take 1 tablet (1,000 mcg total) by mouth once daily. 30 tablet 12    gabapentin (NEURONTIN) 300 MG capsule TAKE ONE CAPSULE BY MOUTH EVERY MORNING, THEN 2 CAPSULE EVERY EVENING 270 capsule 4    irbesartan-hydrochlorothiazide (AVALIDE) 300-12.5 mg per tablet TAKE 1 TABLET BY MOUTH ONCE DAILY. 90 tablet 3    L.acidophil,parac-S.therm-Bif. (RISAQUAD) Cap capsule Take 1 capsule by mouth once daily.      loperamide (IMODIUM A-D) 2 mg Tab Take 2 mg by mouth 4 (four) times daily as needed.      loratadine (CLARITIN) 10 mg tablet TAKE 1 TABLET BY MOUTH EVERY DAY 90 tablet 3    LORazepam (ATIVAN) 1 MG tablet TAKE 1 TABLET BY MOUTH TWICE A DAY 60 tablet 5    megestrol (MEGACE) 40 MG Tab Take 1 tablet (40 mg total) by mouth once daily. 30 tablet 11    methotrexate 2.5 MG Tab TAKE 8 TABLETS BY MOUTH EVERY 7 DAYS.. 120 tablet 3    metoprolol succinate (TOPROL-XL) 50 MG 24 hr tablet TAKE 1 TABLET BY MOUTH DAILY. 30 tablet 12    montelukast (SINGULAIR) 10 mg tablet Take 10 mg by mouth.      MULTIVIT-IRON-MIN-FOLIC ACID 3,500-18-0.4 UNIT-MG-MG ORAL CHEW  Take by mouth.      omeprazole (PRILOSEC) 20 MG capsule TAKE 1 CAPSULE BY MOUTH TWICE DAILY 180 capsule 3    oxybutynin (DITROPAN-XL) 5 MG TR24 Take 1 tablet (5 mg total) by mouth once daily. 30 tablet 11    predniSONE (DELTASONE) 20 MG tablet TAKE 1 TABLET BY MOUTH TWICE A DAY (Patient taking differently: 25 mg once daily) 60 tablet 6    traMADol (ULTRAM) 50 mg tablet TAKE 2 TABLETS BY MOUTH 4 TIMES DAILY 240 tablet 2     No current facility-administered medications on file prior to visit.      Social History     Socioeconomic History    Marital status:      Spouse name: Not on file    Number of children: Not on file    Years of education: Not on file    Highest education level: Not on file   Social Needs    Financial resource strain: Not on file    Food insecurity - worry: Not on file    Food insecurity - inability: Not on file    Transportation needs - medical: Not on file    Transportation needs - non-medical: Not on file   Occupational History    Not on file   Tobacco Use    Smoking status: Never Smoker    Smokeless tobacco: Never Used   Substance and Sexual Activity    Alcohol use: No    Drug use: Not on file    Sexual activity: Not on file   Other Topics Concern    Not on file   Social History Narrative    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  EYES: Visual acuity fine. No photophobia, ocular pain or diplopia.EARS: Denies ear pain, discharge or vertigo.NOSE: No loss of smell, no epistaxis or postnasal drip.MOUTH & THROAT: No hoarseness or change in voice. No excessive gum bleeding.NODES: Denies swollen  glands.  CHEST: Denies DURAN, cyanosis, wheezing, cough and sputum production.  CARDIOVASCULAR: Denies chest pain, PND, orthopnea or reduced exercise tolerance.  ABDOMEN:  No weight loss.Mild abdominal pain, no hematemesis or blood in stool.  URINARY: No flank pain, dysuria or hematuria.  PERIPHERAL VASCULAR: No claudication or cyanosis.  MUSCULOSKELETAL: Negative   NEUROLOGIC: No history of seizures, paralysis, alteration of gait or coordination.    PE Vital signs noted  Heent: Normocephalic,with no recent trauma,PERRLA,EOMI,conjunctiva clear with no exudate.Nasopharynx is not injected .Otic canal.TMs are not affected.Pharynx is slightly red.   Neck:Supple without adenopathy  Chest:Clear bilateral breath sounds normal  Heart:Regular rhthym without murmer  Abdomen:Soft, mild generalized tenderness,no rebound,no masses, no hepatosplenomegaly  Extremeties Mod sev degen changes  Neurologic:Grossly within normal limits  I     DC Summary copied [facility] Medicine  Discharge Summary        Patient Name: Fiorella Connelly  MRN: [id number]  Admission Date: 11/11/2018  Hospital Length of Stay: 1 days  Discharge Date and Time: No discharge date for patient encounter.  Attending Physician: Carlos Gaytan MD   Discharging Provider: Carlos Gaytan MD  Primary Care Provider: Aris Hoover MD        HPI:   This is a 89-year-old lady with history of irritable bowel syndrome, rheumatoid arthritis maintained on prednisone, hypertension, fibromyalgia, GERD, recent hospitalization 11/3 - 11/72018 secondary to small-bowel obstruction.  Patient was admitted by Dr. Valles of general surgery.  Patient received NG tube with conservative treatment.  Her small bowel obstruction eventually resolved.  During the hospitalization, she was given IV antibiotics for left elbow cellulitis.  Patient was discharged home on 11/07 but did not require any further antibiotics.  Patient lives with her  daughter.  Patient had several loose stools on Friday.  She had over 8 loose stools on Saturday.  She has had 5 since this morning.  Patient has had poor p.o. intake.  She also was having worsening generalized weakness.  Daughter was concerned for dehydration.  Patient eas brought to the ED for evaluation.  Stool for C difficile toxin was negative.  Urinalysis was consistent with UTI.  Patient has nausea but no vomiting.  She denies any fever or chills.  She has had intermittent abdominal pain.  Patient will be admitted for further evaluation and management.     * No surgery found *       Hospital Course:   Patient was admitted with acute diarrhea and acute dehydration.  Stool studies were ordered.  She was placed on IV fluids.  Was originally started on p.o. vancomycin for probable C diff.  Stool studies returned negative, next home oxygen stopped, diarrhea self-resolving.  Continue on probiotic.  Home health PT OT. For a full hospital course please refer to all notes, labs, images during patient's stay      Consults:           Consults (From admission, onward)         Status Ordering Provider       Inpatient consult to Social Work/Case Management  Once     Provider:  (Not yet assigned)    Ordered REED OKEEFE       IP consult to case management  Once     Provider:  (Not yet assigned)    Acknowledged ERNST SHANKS             No new Assessment & Plan notes have been filed under this hospital service since the last note was generated.  Service: Hospital Medicine           Final Active Diagnoses:     Diagnosis Date Noted POA    PRINCIPAL PROBLEM:  Diarrhea [R19.7] 11/11/2018 Yes    Acute dehydration [E86.0] 11/11/2018 Yes    Rheumatoid arthritis [M06.9] 08/29/2016 Yes    Benign essential HTN [I10] 08/29/2016 Yes    Anxiety [F41.9] 08/29/2016 Yes       Problems Resolved During this Admission:         Discharged Condition: stable     Disposition: Home-Health Care [facility]     Follow Up:      Follow-up  Information      Aris Hoover MD In 1 month.    Specialty:  Family Medicine  Why:  December 26, 2018  2:00p.m.  Contact information:  [address] [facility] [address] [phone]

## 2018-12-26 PROBLEM — R19.7 NAUSEA VOMITING AND DIARRHEA: Status: ACTIVE | Noted: 2018-01-01

## 2018-12-26 PROBLEM — N30.00 ACUTE CYSTITIS WITHOUT HEMATURIA: Status: ACTIVE | Noted: 2018-01-01

## 2018-12-26 PROBLEM — R11.2 NAUSEA VOMITING AND DIARRHEA: Status: ACTIVE | Noted: 2018-01-01

## 2018-12-26 PROBLEM — K57.92 DIVERTICULITIS: Status: ACTIVE | Noted: 2018-01-01

## 2018-12-27 PROBLEM — A04.72 CLOSTRIDIUM DIFFICILE DIARRHEA: Status: ACTIVE | Noted: 2018-01-01

## 2018-12-29 PROBLEM — E87.6 HYPOKALEMIA: Status: ACTIVE | Noted: 2018-01-01

## 2018-12-30 PROBLEM — E87.6 HYPOKALEMIA: Status: RESOLVED | Noted: 2018-01-01 | Resolved: 2018-01-01

## 2018-12-30 PROBLEM — R53.81 DEBILITY: Status: ACTIVE | Noted: 2018-01-01

## 2019-01-01 ENCOUNTER — TELEPHONE (OUTPATIENT)
Dept: GASTROENTEROLOGY | Facility: CLINIC | Age: 84
End: 2019-01-01

## 2019-01-01 ENCOUNTER — TELEPHONE (OUTPATIENT)
Dept: FAMILY MEDICINE | Facility: CLINIC | Age: 84
End: 2019-01-01

## 2019-01-01 ENCOUNTER — LAB VISIT (OUTPATIENT)
Dept: LAB | Facility: HOSPITAL | Age: 84
End: 2019-01-01
Attending: FAMILY MEDICINE
Payer: MEDICARE

## 2019-01-01 ENCOUNTER — TELEPHONE (OUTPATIENT)
Dept: ADMINISTRATIVE | Facility: CLINIC | Age: 84
End: 2019-01-01

## 2019-01-01 ENCOUNTER — OFFICE VISIT (OUTPATIENT)
Dept: GASTROENTEROLOGY | Facility: CLINIC | Age: 84
End: 2019-01-01
Payer: MEDICARE

## 2019-01-01 ENCOUNTER — OFFICE VISIT (OUTPATIENT)
Dept: FAMILY MEDICINE | Facility: CLINIC | Age: 84
End: 2019-01-01
Payer: MEDICARE

## 2019-01-01 ENCOUNTER — PES CALL (OUTPATIENT)
Dept: ADMINISTRATIVE | Facility: CLINIC | Age: 84
End: 2019-01-01

## 2019-01-01 ENCOUNTER — TELEPHONE (OUTPATIENT)
Dept: UROLOGY | Facility: CLINIC | Age: 84
End: 2019-01-01

## 2019-01-01 ENCOUNTER — HOSPITAL ENCOUNTER (OUTPATIENT)
Dept: RADIOLOGY | Facility: HOSPITAL | Age: 84
Discharge: HOME | End: 2019-10-09
Attending: NURSE PRACTITIONER
Payer: MEDICARE

## 2019-01-01 VITALS
SYSTOLIC BLOOD PRESSURE: 115 MMHG | WEIGHT: 130 LBS | HEIGHT: 63 IN | BODY MASS INDEX: 23.04 KG/M2 | TEMPERATURE: 99 F | DIASTOLIC BLOOD PRESSURE: 67 MMHG | HEART RATE: 77 BPM

## 2019-01-01 VITALS
WEIGHT: 130.06 LBS | WEIGHT: 130 LBS | RESPIRATION RATE: 18 BRPM | BODY MASS INDEX: 23.04 KG/M2 | HEIGHT: 63 IN | SYSTOLIC BLOOD PRESSURE: 134 MMHG | HEIGHT: 63 IN | TEMPERATURE: 98 F | DIASTOLIC BLOOD PRESSURE: 66 MMHG | BODY MASS INDEX: 23.04 KG/M2 | HEART RATE: 77 BPM

## 2019-01-01 VITALS
HEIGHT: 63 IN | HEART RATE: 90 BPM | DIASTOLIC BLOOD PRESSURE: 66 MMHG | BODY MASS INDEX: 24.8 KG/M2 | SYSTOLIC BLOOD PRESSURE: 102 MMHG | WEIGHT: 140 LBS

## 2019-01-01 VITALS
WEIGHT: 145 LBS | TEMPERATURE: 98 F | SYSTOLIC BLOOD PRESSURE: 130 MMHG | HEIGHT: 63 IN | BODY MASS INDEX: 25.69 KG/M2 | HEART RATE: 81 BPM | DIASTOLIC BLOOD PRESSURE: 71 MMHG

## 2019-01-01 DIAGNOSIS — M06.9 RHEUMATOID ARTHRITIS, INVOLVING UNSPECIFIED SITE, UNSPECIFIED RHEUMATOID FACTOR PRESENCE: ICD-10-CM

## 2019-01-01 DIAGNOSIS — J20.9 BRONCHITIS WITH BRONCHOSPASM: Primary | ICD-10-CM

## 2019-01-01 DIAGNOSIS — I10 BENIGN ESSENTIAL HTN: ICD-10-CM

## 2019-01-01 DIAGNOSIS — Z87.19 HISTORY OF GASTROESOPHAGEAL REFLUX (GERD): ICD-10-CM

## 2019-01-01 DIAGNOSIS — R10.84 GENERALIZED ABDOMINAL PAIN: ICD-10-CM

## 2019-01-01 DIAGNOSIS — M06.9 RHEUMATOID ARTHRITIS INVOLVING MULTIPLE SITES, UNSPECIFIED RHEUMATOID FACTOR PRESENCE: Primary | ICD-10-CM

## 2019-01-01 DIAGNOSIS — Z86.19 HISTORY OF CLOSTRIDIUM DIFFICILE INFECTION: ICD-10-CM

## 2019-01-01 DIAGNOSIS — R79.89 ELEVATED TSH: ICD-10-CM

## 2019-01-01 DIAGNOSIS — Z79.899 OTHER LONG TERM (CURRENT) DRUG THERAPY: ICD-10-CM

## 2019-01-01 DIAGNOSIS — N39.0 URINARY TRACT INFECTION, SITE NOT SPECIFIED: Primary | ICD-10-CM

## 2019-01-01 DIAGNOSIS — R93.41 ABNORMAL CT SCAN, BLADDER: Primary | ICD-10-CM

## 2019-01-01 DIAGNOSIS — K92.1 HEMATOCHEZIA: ICD-10-CM

## 2019-01-01 DIAGNOSIS — A07.8 BLASTOCYSTIS HOMINIS INFECTION: ICD-10-CM

## 2019-01-01 DIAGNOSIS — Z87.19 HISTORY OF HEMORRHOIDS: ICD-10-CM

## 2019-01-01 DIAGNOSIS — R63.4 WEIGHT LOSS: ICD-10-CM

## 2019-01-01 DIAGNOSIS — R13.10 DYSPHAGIA, UNSPECIFIED TYPE: ICD-10-CM

## 2019-01-01 DIAGNOSIS — M06.9 RHEUMATOID ARTHRITIS INVOLVING MULTIPLE SITES, UNSPECIFIED RHEUMATOID FACTOR PRESENCE: ICD-10-CM

## 2019-01-01 DIAGNOSIS — K21.9 GASTROESOPHAGEAL REFLUX DISEASE, ESOPHAGITIS PRESENCE NOT SPECIFIED: ICD-10-CM

## 2019-01-01 DIAGNOSIS — R19.7 DIARRHEA, UNSPECIFIED TYPE: Primary | ICD-10-CM

## 2019-01-01 DIAGNOSIS — Z79.60 LONG-TERM USE OF IMMUNOSUPPRESSANT MEDICATION: ICD-10-CM

## 2019-01-01 DIAGNOSIS — D84.9 IMMUNOCOMPROMISED: ICD-10-CM

## 2019-01-01 DIAGNOSIS — R19.7 DIARRHEA, UNSPECIFIED TYPE: ICD-10-CM

## 2019-01-01 DIAGNOSIS — R53.1 WEAKNESS: ICD-10-CM

## 2019-01-01 DIAGNOSIS — R79.89 ABNORMAL CBC: Primary | ICD-10-CM

## 2019-01-01 DIAGNOSIS — R13.14 PHARYNGOESOPHAGEAL DYSPHAGIA: ICD-10-CM

## 2019-01-01 DIAGNOSIS — T14.8XXA BRUISING: ICD-10-CM

## 2019-01-01 DIAGNOSIS — Z87.39 HISTORY OF RHEUMATOID ARTHRITIS: ICD-10-CM

## 2019-01-01 DIAGNOSIS — A04.72 C. DIFFICILE COLITIS: ICD-10-CM

## 2019-01-01 DIAGNOSIS — E87.6 HYPOKALEMIA: ICD-10-CM

## 2019-01-01 DIAGNOSIS — F41.9 ANXIETY: ICD-10-CM

## 2019-01-01 DIAGNOSIS — Z51.89 VISIT FOR WOUND CARE: ICD-10-CM

## 2019-01-01 DIAGNOSIS — K62.89 RECTAL PAIN: ICD-10-CM

## 2019-01-01 DIAGNOSIS — R53.1 WEAKNESS: Primary | ICD-10-CM

## 2019-01-01 DIAGNOSIS — K52.9 INFLAMMATORY BOWEL DISEASE: Primary | ICD-10-CM

## 2019-01-01 DIAGNOSIS — I10 HYPERTENSION, UNSPECIFIED TYPE: Primary | ICD-10-CM

## 2019-01-01 LAB
ALBUMIN SERPL BCP-MCNC: 3 G/DL (ref 3.5–5.2)
ALP SERPL-CCNC: 72 U/L (ref 55–135)
ALT SERPL W/O P-5'-P-CCNC: 11 U/L (ref 10–44)
ANION GAP SERPL CALC-SCNC: 17 MMOL/L (ref 8–16)
AST SERPL-CCNC: 19 U/L (ref 10–40)
BACTERIA #/AREA URNS HPF: ABNORMAL /HPF
BACTERIA #/AREA URNS HPF: ABNORMAL /HPF
BACTERIA STL CULT: NORMAL
BACTERIA UR CULT: NORMAL
BASOPHILS # BLD AUTO: 0.02 K/UL (ref 0–0.2)
BASOPHILS NFR BLD: 0.3 % (ref 0–1.9)
BILIRUB SERPL-MCNC: 0.5 MG/DL (ref 0.1–1)
BILIRUB UR QL STRIP: NEGATIVE
BILIRUB UR QL STRIP: NEGATIVE
BUN SERPL-MCNC: 19 MG/DL (ref 10–30)
C DIFF GDH STL QL: POSITIVE
C DIFF TOX A+B STL QL IA: NEGATIVE
C DIFF TOX GENS STL QL NAA+PROBE: POSITIVE
CALCIUM SERPL-MCNC: 8.9 MG/DL (ref 8.7–10.5)
CAOX CRY URNS QL MICRO: ABNORMAL
CHLORIDE SERPL-SCNC: 103 MMOL/L (ref 95–110)
CLARITY UR: ABNORMAL
CLARITY UR: CLEAR
CO2 SERPL-SCNC: 22 MMOL/L (ref 23–29)
COLOR UR: ABNORMAL
COLOR UR: YELLOW
CREAT SERPL-MCNC: 0.9 MG/DL (ref 0.5–1.4)
CRYPTOSP AG STL QL IA: NEGATIVE
DIFFERENTIAL METHOD: ABNORMAL
E COLI SXT1 STL QL IA: NEGATIVE
E COLI SXT2 STL QL IA: NEGATIVE
EOSINOPHIL # BLD AUTO: 0 K/UL (ref 0–0.5)
EOSINOPHIL NFR BLD: 0 % (ref 0–8)
ERYTHROCYTE [DISTWIDTH] IN BLOOD BY AUTOMATED COUNT: 18 % (ref 11.5–14.5)
EST. GFR  (AFRICAN AMERICAN): >60 ML/MIN/1.73 M^2
EST. GFR  (NON AFRICAN AMERICAN): 56.1 ML/MIN/1.73 M^2
G LAMBLIA AG STL QL IA: NEGATIVE
GLUCOSE SERPL-MCNC: 183 MG/DL (ref 70–110)
GLUCOSE UR QL STRIP: NEGATIVE
GLUCOSE UR QL STRIP: NEGATIVE
HCT VFR BLD AUTO: 37.6 % (ref 37–48.5)
HGB BLD-MCNC: 10.7 G/DL (ref 12–16)
HGB UR QL STRIP: ABNORMAL
HGB UR QL STRIP: ABNORMAL
HYALINE CASTS #/AREA URNS LPF: 0 /LPF
IMM GRANULOCYTES # BLD AUTO: 0.35 K/UL (ref 0–0.04)
IMM GRANULOCYTES NFR BLD AUTO: 4.4 % (ref 0–0.5)
INR PPP: 1 (ref 0.8–1.2)
KETONES UR QL STRIP: NEGATIVE
KETONES UR QL STRIP: NEGATIVE
LEUKOCYTE ESTERASE UR QL STRIP: ABNORMAL
LEUKOCYTE ESTERASE UR QL STRIP: ABNORMAL
LYMPHOCYTES # BLD AUTO: 0.6 K/UL (ref 1–4.8)
LYMPHOCYTES NFR BLD: 7.3 % (ref 18–48)
MCH RBC QN AUTO: 29.8 PG (ref 27–31)
MCHC RBC AUTO-ENTMCNC: 28.5 G/DL (ref 32–36)
MCV RBC AUTO: 105 FL (ref 82–98)
MICROSCOPIC COMMENT: ABNORMAL
MICROSCOPIC COMMENT: ABNORMAL
MONOCYTES # BLD AUTO: 0.3 K/UL (ref 0.3–1)
MONOCYTES NFR BLD: 3.6 % (ref 4–15)
NEUTROPHILS # BLD AUTO: 6.8 K/UL (ref 1.8–7.7)
NEUTROPHILS NFR BLD: 84.4 % (ref 38–73)
NITRITE UR QL STRIP: NEGATIVE
NITRITE UR QL STRIP: POSITIVE
NRBC BLD-RTO: 0 /100 WBC
O+P STL MICRO: ABNORMAL
PH UR STRIP: 6 [PH] (ref 5–8)
PH UR STRIP: 6 [PH] (ref 5–8)
PLATELET # BLD AUTO: 388 K/UL (ref 150–350)
PMV BLD AUTO: 8.8 FL (ref 9.2–12.9)
POTASSIUM SERPL-SCNC: 3.1 MMOL/L (ref 3.5–5.1)
PROT SERPL-MCNC: 6.1 G/DL (ref 6–8.4)
PROT UR QL STRIP: ABNORMAL
PROT UR QL STRIP: ABNORMAL
PROTHROMBIN TIME: 10.7 SEC (ref 9–12.5)
RBC # BLD AUTO: 3.59 M/UL (ref 4–5.4)
RBC #/AREA URNS HPF: 3 /HPF (ref 0–4)
RBC #/AREA URNS HPF: >100 /HPF (ref 0–4)
SODIUM SERPL-SCNC: 142 MMOL/L (ref 136–145)
SP GR UR STRIP: 1.02 (ref 1–1.03)
SP GR UR STRIP: 1.02 (ref 1–1.03)
SQUAMOUS #/AREA URNS HPF: 9 /HPF
TSH SERPL DL<=0.005 MIU/L-ACNC: 1.17 UIU/ML (ref 0.4–4)
URN SPEC COLLECT METH UR: ABNORMAL
URN SPEC COLLECT METH UR: ABNORMAL
WBC # BLD AUTO: 8 K/UL (ref 3.9–12.7)
WBC #/AREA STL HPF: NORMAL /[HPF]
WBC #/AREA URNS HPF: >100 /HPF (ref 0–5)
WBC #/AREA URNS HPF: >100 /HPF (ref 0–5)
WBC CLUMPS URNS QL MICRO: ABNORMAL
WBC CLUMPS URNS QL MICRO: ABNORMAL

## 2019-01-01 PROCEDURE — 99214 OFFICE O/P EST MOD 30 MIN: CPT | Mod: S$PBB,,, | Performed by: NURSE PRACTITIONER

## 2019-01-01 PROCEDURE — 99999 PR PBB SHADOW E&M-EST. PATIENT-LVL V: CPT | Mod: PBBFAC,,, | Performed by: NURSE PRACTITIONER

## 2019-01-01 PROCEDURE — 87329 GIARDIA AG IA: CPT

## 2019-01-01 PROCEDURE — 99214 OFFICE O/P EST MOD 30 MIN: CPT | Mod: PBBFAC,PO | Performed by: FAMILY MEDICINE

## 2019-01-01 PROCEDURE — 84443 ASSAY THYROID STIM HORMONE: CPT

## 2019-01-01 PROCEDURE — 99999 PR PBB SHADOW E&M-EST. PATIENT-LVL III: CPT | Mod: PBBFAC,,, | Performed by: NURSE PRACTITIONER

## 2019-01-01 PROCEDURE — 99215 OFFICE O/P EST HI 40 MIN: CPT | Mod: PBBFAC,PO | Performed by: NURSE PRACTITIONER

## 2019-01-01 PROCEDURE — 25500020 PHARM REV CODE 255: Mod: PO | Performed by: NURSE PRACTITIONER

## 2019-01-01 PROCEDURE — 87086 URINE CULTURE/COLONY COUNT: CPT

## 2019-01-01 PROCEDURE — 87077 CULTURE AEROBIC IDENTIFY: CPT

## 2019-01-01 PROCEDURE — 99999 PR PBB SHADOW E&M-EST. PATIENT-LVL V: ICD-10-PCS | Mod: PBBFAC,,, | Performed by: FAMILY MEDICINE

## 2019-01-01 PROCEDURE — 90662 IIV NO PRSV INCREASED AG IM: CPT | Mod: PBBFAC,PO

## 2019-01-01 PROCEDURE — 74177 CT ABD & PELVIS W/CONTRAST: CPT | Mod: 26,,, | Performed by: RADIOLOGY

## 2019-01-01 PROCEDURE — 99999 PR PBB SHADOW E&M-EST. PATIENT-LVL III: ICD-10-PCS | Mod: PBBFAC,,, | Performed by: NURSE PRACTITIONER

## 2019-01-01 PROCEDURE — 99213 OFFICE O/P EST LOW 20 MIN: CPT | Mod: S$PBB,,, | Performed by: NURSE PRACTITIONER

## 2019-01-01 PROCEDURE — 87045 FECES CULTURE AEROBIC BACT: CPT

## 2019-01-01 PROCEDURE — 87493 C DIFF AMPLIFIED PROBE: CPT

## 2019-01-01 PROCEDURE — 99213 OFFICE O/P EST LOW 20 MIN: CPT | Mod: PBBFAC,PO | Performed by: NURSE PRACTITIONER

## 2019-01-01 PROCEDURE — 80053 COMPREHEN METABOLIC PANEL: CPT

## 2019-01-01 PROCEDURE — 87449 NOS EACH ORGANISM AG IA: CPT

## 2019-01-01 PROCEDURE — 87046 STOOL CULTR AEROBIC BACT EA: CPT | Mod: 59

## 2019-01-01 PROCEDURE — 87088 URINE BACTERIA CULTURE: CPT

## 2019-01-01 PROCEDURE — 99214 OFFICE O/P EST MOD 30 MIN: CPT | Mod: S$PBB,,, | Performed by: FAMILY MEDICINE

## 2019-01-01 PROCEDURE — 36415 COLL VENOUS BLD VENIPUNCTURE: CPT | Mod: PO

## 2019-01-01 PROCEDURE — 99999 PR PBB SHADOW E&M-EST. PATIENT-LVL V: CPT | Mod: PBBFAC,,, | Performed by: FAMILY MEDICINE

## 2019-01-01 PROCEDURE — 99214 PR OFFICE/OUTPT VISIT, EST, LEVL IV, 30-39 MIN: ICD-10-PCS | Mod: S$PBB,,, | Performed by: NURSE PRACTITIONER

## 2019-01-01 PROCEDURE — 99999 PR PBB SHADOW E&M-EST. PATIENT-LVL IV: CPT | Mod: PBBFAC,,, | Performed by: FAMILY MEDICINE

## 2019-01-01 PROCEDURE — 81000 URINALYSIS NONAUTO W/SCOPE: CPT | Mod: PO

## 2019-01-01 PROCEDURE — 85610 PROTHROMBIN TIME: CPT

## 2019-01-01 PROCEDURE — 99214 PR OFFICE/OUTPT VISIT, EST, LEVL IV, 30-39 MIN: ICD-10-PCS | Mod: S$PBB,,, | Performed by: FAMILY MEDICINE

## 2019-01-01 PROCEDURE — 87427 SHIGA-LIKE TOXIN AG IA: CPT

## 2019-01-01 PROCEDURE — G0009 ADMIN PNEUMOCOCCAL VACCINE: HCPCS | Mod: PBBFAC,PO

## 2019-01-01 PROCEDURE — 74177 CT ABD & PELVIS W/CONTRAST: CPT | Mod: TC,PO

## 2019-01-01 PROCEDURE — 99213 PR OFFICE/OUTPT VISIT, EST, LEVL III, 20-29 MIN: ICD-10-PCS | Mod: S$PBB,,, | Performed by: NURSE PRACTITIONER

## 2019-01-01 PROCEDURE — 99999 PR PBB SHADOW E&M-EST. PATIENT-LVL V: ICD-10-PCS | Mod: PBBFAC,,, | Performed by: NURSE PRACTITIONER

## 2019-01-01 PROCEDURE — 74177 CT ABDOMEN PELVIS WITH CONTRAST: ICD-10-PCS | Mod: 26,,, | Performed by: RADIOLOGY

## 2019-01-01 PROCEDURE — 89055 LEUKOCYTE ASSESSMENT FECAL: CPT

## 2019-01-01 PROCEDURE — 99215 OFFICE O/P EST HI 40 MIN: CPT | Mod: PBBFAC,PO | Performed by: FAMILY MEDICINE

## 2019-01-01 PROCEDURE — 99215 OFFICE O/P EST HI 40 MIN: CPT | Mod: PBBFAC,PO,25 | Performed by: FAMILY MEDICINE

## 2019-01-01 PROCEDURE — 85025 COMPLETE CBC W/AUTO DIFF WBC: CPT

## 2019-01-01 PROCEDURE — 87186 SC STD MICRODIL/AGAR DIL: CPT

## 2019-01-01 PROCEDURE — 99999 PR PBB SHADOW E&M-EST. PATIENT-LVL IV: ICD-10-PCS | Mod: PBBFAC,,, | Performed by: FAMILY MEDICINE

## 2019-01-01 RX ORDER — MUPIROCIN 20 MG/G
OINTMENT TOPICAL 2 TIMES DAILY
Qty: 30 G | Refills: 3 | Status: SHIPPED | OUTPATIENT
Start: 2019-01-01 | End: 2019-01-01

## 2019-01-01 RX ORDER — MUPIROCIN 20 MG/G
OINTMENT TOPICAL 3 TIMES DAILY
Qty: 30 G | Refills: 3 | Status: SHIPPED | OUTPATIENT
Start: 2019-01-01 | End: 2019-01-01

## 2019-01-01 RX ORDER — TRAMADOL HYDROCHLORIDE 50 MG/1
TABLET ORAL
Qty: 240 TABLET | Refills: 4 | Status: SHIPPED | OUTPATIENT
Start: 2019-01-01 | End: 2019-01-01 | Stop reason: SDUPTHER

## 2019-01-01 RX ORDER — MEGESTROL ACETATE 40 MG/1
TABLET ORAL
Qty: 90 TABLET | Refills: 3 | Status: SHIPPED | OUTPATIENT
Start: 2019-01-01 | End: 2019-01-01 | Stop reason: SDUPTHER

## 2019-01-01 RX ORDER — LORATADINE 10 MG/1
TABLET ORAL
Qty: 90 TABLET | Refills: 3 | Status: SHIPPED | OUTPATIENT
Start: 2019-01-01 | End: 2019-01-01 | Stop reason: SDUPTHER

## 2019-01-01 RX ORDER — MONTELUKAST SODIUM 10 MG/1
TABLET ORAL
Qty: 90 TABLET | Refills: 4 | Status: SHIPPED | OUTPATIENT
Start: 2019-01-01 | End: 2019-01-01 | Stop reason: SDUPTHER

## 2019-01-01 RX ORDER — METRONIDAZOLE 250 MG/1
750 TABLET ORAL 3 TIMES DAILY
Qty: 45 TABLET | Refills: 0 | Status: ON HOLD | OUTPATIENT
Start: 2019-01-01 | End: 2019-01-01 | Stop reason: HOSPADM

## 2019-01-01 RX ORDER — FOLIC ACID 1 MG/1
TABLET ORAL
Qty: 30 TABLET | Refills: 11 | Status: SHIPPED | OUTPATIENT
Start: 2019-01-01 | End: 2019-01-01 | Stop reason: SDUPTHER

## 2019-01-01 RX ORDER — OMEPRAZOLE 20 MG/1
CAPSULE, DELAYED RELEASE ORAL
Qty: 180 CAPSULE | Refills: 3 | OUTPATIENT
Start: 2019-01-01

## 2019-01-01 RX ORDER — CIPROFLOXACIN 500 MG/1
500 TABLET ORAL 2 TIMES DAILY
Qty: 14 TABLET | Refills: 0 | Status: SHIPPED | OUTPATIENT
Start: 2019-01-01 | End: 2019-01-01

## 2019-01-01 RX ORDER — LOPERAMIDE HYDROCHLORIDE 2 MG/1
2 CAPSULE ORAL 4 TIMES DAILY PRN
COMMUNITY
End: 2019-01-01 | Stop reason: ALTCHOICE

## 2019-01-01 RX ORDER — METHOTREXATE 2.5 MG/1
TABLET ORAL
Qty: 120 TABLET | Refills: 3 | Status: SHIPPED | OUTPATIENT
Start: 2019-01-01 | End: 2019-01-01 | Stop reason: SDUPTHER

## 2019-01-01 RX ORDER — AMLODIPINE BESYLATE 5 MG/1
5 TABLET ORAL DAILY
Qty: 30 TABLET | Refills: 11 | Status: SHIPPED | OUTPATIENT
Start: 2019-01-01 | End: 2019-01-01 | Stop reason: SDUPTHER

## 2019-01-01 RX ORDER — NYSTATIN 100000 [USP'U]/ML
4 SUSPENSION ORAL 4 TIMES DAILY
Qty: 473 ML | Refills: 3 | Status: SHIPPED | OUTPATIENT
Start: 2019-01-01 | End: 2019-01-01

## 2019-01-01 RX ORDER — MELOXICAM 7.5 MG/1
7.5 TABLET ORAL EVERY MORNING
Refills: 3 | Status: ON HOLD | COMMUNITY
Start: 2019-01-01 | End: 2019-01-01 | Stop reason: HOSPADM

## 2019-01-01 RX ORDER — OMEPRAZOLE 20 MG/1
CAPSULE, DELAYED RELEASE ORAL
Qty: 180 CAPSULE | Refills: 3 | Status: SHIPPED | OUTPATIENT
Start: 2019-01-01 | End: 2019-01-01 | Stop reason: SDUPTHER

## 2019-01-01 RX ORDER — POTASSIUM CHLORIDE 750 MG/1
CAPSULE, EXTENDED RELEASE ORAL
Qty: 60 CAPSULE | Refills: 12 | Status: SHIPPED | OUTPATIENT
Start: 2019-01-01

## 2019-01-01 RX ORDER — DOXYCYCLINE 100 MG/1
100 CAPSULE ORAL 2 TIMES DAILY
Qty: 20 CAPSULE | Refills: 0 | Status: SHIPPED | OUTPATIENT
Start: 2019-01-01 | End: 2019-01-01

## 2019-01-01 RX ORDER — NYSTATIN 100000 [USP'U]/ML
4 SUSPENSION ORAL 4 TIMES DAILY
Qty: 473 ML | Refills: 6 | Status: SHIPPED | OUTPATIENT
Start: 2019-01-01 | End: 2019-01-01

## 2019-01-01 RX ORDER — OLMESARTAN MEDOXOMIL AND HYDROCHLOROTHIAZIDE 40/12.5 40; 12.5 MG/1; MG/1
1 TABLET ORAL EVERY MORNING
Refills: 3 | Status: ON HOLD | COMMUNITY
Start: 2019-01-01 | End: 2019-01-01 | Stop reason: HOSPADM

## 2019-01-01 RX ORDER — NYSTATIN 100000 [USP'U]/ML
4 SUSPENSION ORAL 4 TIMES DAILY
Qty: 160 ML | Refills: 0 | Status: SHIPPED | OUTPATIENT
Start: 2019-01-01 | End: 2019-01-01

## 2019-01-01 RX ORDER — LORAZEPAM 1 MG/1
TABLET ORAL
Qty: 60 TABLET | Refills: 4 | Status: SHIPPED | OUTPATIENT
Start: 2019-01-01 | End: 2019-01-01 | Stop reason: SDUPTHER

## 2019-01-01 RX ORDER — TRAMADOL HYDROCHLORIDE 50 MG/1
TABLET ORAL
Qty: 240 TABLET | Refills: 2 | Status: SHIPPED | OUTPATIENT
Start: 2019-01-01 | End: 2019-01-01 | Stop reason: SDUPTHER

## 2019-01-01 RX ORDER — CODEINE PHOSPHATE AND GUAIFENESIN 10; 100 MG/5ML; MG/5ML
5 SOLUTION ORAL 3 TIMES DAILY PRN
Qty: 118 ML | Refills: 0 | Status: SHIPPED | OUTPATIENT
Start: 2019-01-01 | End: 2019-01-01

## 2019-01-01 RX ORDER — GABAPENTIN 300 MG/1
CAPSULE ORAL
Qty: 270 CAPSULE | Refills: 4 | Status: SHIPPED | OUTPATIENT
Start: 2019-01-01 | End: 2019-01-01 | Stop reason: SDUPTHER

## 2019-01-01 RX ORDER — METOPROLOL SUCCINATE 50 MG/1
TABLET, EXTENDED RELEASE ORAL
Qty: 30 TABLET | Refills: 12 | Status: SHIPPED | OUTPATIENT
Start: 2019-01-01 | End: 2019-01-01 | Stop reason: SDUPTHER

## 2019-01-01 RX ORDER — VANCOMYCIN HYDROCHLORIDE 125 MG/1
125 CAPSULE ORAL 4 TIMES DAILY
Qty: 40 CAPSULE | Refills: 0 | Status: ON HOLD | OUTPATIENT
Start: 2019-01-01 | End: 2019-01-01

## 2019-01-01 RX ORDER — METRONIDAZOLE 250 MG/1
250 TABLET ORAL 2 TIMES DAILY
Qty: 20 TABLET | Refills: 0 | Status: SHIPPED | OUTPATIENT
Start: 2019-01-01 | End: 2019-01-01

## 2019-01-01 RX ORDER — OXYBUTYNIN CHLORIDE 5 MG/1
TABLET, EXTENDED RELEASE ORAL
Qty: 30 TABLET | Refills: 11 | Status: SHIPPED | OUTPATIENT
Start: 2019-01-01 | End: 2019-01-01 | Stop reason: SDUPTHER

## 2019-01-01 RX ORDER — POTASSIUM CHLORIDE 750 MG/1
CAPSULE, EXTENDED RELEASE ORAL
Qty: 60 CAPSULE | Refills: 1 | Status: SHIPPED | OUTPATIENT
Start: 2019-01-01 | End: 2019-01-01 | Stop reason: SDUPTHER

## 2019-01-01 RX ADMIN — IOHEXOL 30 ML: 350 INJECTION, SOLUTION INTRAVENOUS at 10:10

## 2019-01-01 RX ADMIN — IOHEXOL 75 ML: 350 INJECTION, SOLUTION INTRAVENOUS at 10:10

## 2019-01-08 NOTE — TELEPHONE ENCOUNTER
pts daughter notified that HH orders will be sent to [facility]. This message can be deleted once approved by Dr. Hoover.

## 2019-01-08 NOTE — TELEPHONE ENCOUNTER
----- Message from Dina Machuca sent at 1/8/2019  3:36 PM CST -----  Jamila ( Pt daughter )  is requesting a call from nurse to discuss health concerns with the pt.          Please callJamila ( Pt daughter ) pt back at [phone]

## 2019-01-10 NOTE — TELEPHONE ENCOUNTER
----- Message from Fredrick Fraser sent at 1/10/2019  3:46 PM CST -----  Contact: [place] from [facility]   States pt is complaining about really dark urine and wants to get an order to collect a urine sample on pt and can be reached at [phone]//thanks/dbw

## 2019-01-14 NOTE — TELEPHONE ENCOUNTER
----- Message from Puja Lauren sent at 1/14/2019  1:48 PM CST -----  Contact: Danielle - [facility]  States the pt mouth is sore around her gums it's hard for the pt to eat please call in Nystatin for this pt, she also has a ulcer in her nose and he wants to be advised, can be reached at [phone]///thxMW

## 2019-01-25 NOTE — TELEPHONE ENCOUNTER
Home Health SOC 01/09/2019 - 03/09/2019 with [facility] ([facility] - Dr. Aris Hoover. Patient received SN, PT, OT  and HHA services.

## 2019-02-11 NOTE — TELEPHONE ENCOUNTER
----- Message from Chelle Burnett sent at 2/11/2019  4:08 PM CST -----  Contact: [facility] - Ms Zapata  Type:  Patient Returning Call    Who Called:[facility] Nurse - Ms GuerraJodi  Who Left Message for Patient: Dr Hoover Office  Does the patient know what this is regarding?: no   Would the patient rather a call back or a response via [facility]? Call back  Best Call Back Number: [phone]  Additional Information:

## 2019-02-11 NOTE — TELEPHONE ENCOUNTER
Jodi, home health nurse, stated she saw patient to day left eye is red, sore, and looks like popped blood vessel, x 2wks, no visual issues, using reg eye drops, has used azithromycin eye oint in past.  Also mouth is sore irritated around gums, ulceration, doesn't have any more nystatin swish and swallow,and that helps

## 2019-02-11 NOTE — TELEPHONE ENCOUNTER
----- Message from Mary Lou Griffith sent at 2/11/2019  2:15 PM CST -----  Contact: pt home health nurse  Type:  Needs Medical Advice    Who Called: Jodi  Symptoms (please be specific): pt left eye is red with blood and it has been this way for 2 weeks now   How long has patient had these symptoms:  2 weeks   Pharmacy name and phone #:      [id number] [place] [facility] [address] [place] [address]  [address] [address]  [place] [address]  Phone: [phone] Fax: [phone]    Would the patient rather a call back or a response via [facility]? callback  Best Call Back Number: [phone]  Additional Information: caller is also calling to let the  know that the pt gums are sore and irritated

## 2019-02-12 NOTE — TELEPHONE ENCOUNTER
----- Message from Antoinette Brown sent at 2/12/2019 12:48 PM CST -----  Contact: Jodi/Home Health   Jodi returned call regards to the ulcers in patient mouth, Please call her at [phone].    Thanks  Td

## 2019-02-12 NOTE — TELEPHONE ENCOUNTER
----- Message from Patel Green sent at 2/12/2019  1:35 PM CST -----  Contact: carmen Zapata/ [facility]  She's calling in regards to receiving the dr's message and she will let the pt know, [phone] (cell)

## 2019-02-12 NOTE — TELEPHONE ENCOUNTER
If eye is only discolored and no pain or vision change-ok to just obs-if not needs Opt con; rx for nystatin oral sent

## 2019-03-15 NOTE — TELEPHONE ENCOUNTER
----- Message from Sneha Mathews sent at 3/15/2019  4:06 PM CDT -----  Contact: daughter   .Type:  RX Refill Request    Who Called: daughter   Refill or New Rx:refill  RX Name and Strength:tramodol ... She doesn't know the strength   How is the patient currently taking it? (ex. 1XDay): 4xday  Is this a 30 day or 90 day RX: 30 day  Preferred Pharmacy with phone number: .  [id number] [place] [facility] [address]  [address]  [place] [address]  Phone: [phone] Fax: [phone]  Local or Mail Order: local   Ordering Provider: heather   Would the patient rather a call back or a response via [facility]?  Call back   Best Call Back Number: [phone] (home)     Additional Information: ...

## 2019-03-17 PROBLEM — R10.84 GENERALIZED ABDOMINAL PAIN: Status: RESOLVED | Noted: 2018-01-01 | Resolved: 2019-01-01

## 2019-03-17 PROBLEM — A04.72 CLOSTRIDIUM DIFFICILE DIARRHEA: Status: RESOLVED | Noted: 2018-01-01 | Resolved: 2019-01-01

## 2019-03-17 PROBLEM — E86.0 ACUTE DEHYDRATION: Status: RESOLVED | Noted: 2018-01-01 | Resolved: 2019-01-01

## 2019-03-17 PROBLEM — L03.114 LEFT ARM CELLULITIS: Status: RESOLVED | Noted: 2018-01-01 | Resolved: 2019-01-01

## 2019-03-17 PROBLEM — R79.89 ELEVATED TROPONIN: Status: RESOLVED | Noted: 2018-01-01 | Resolved: 2019-01-01

## 2019-03-17 PROBLEM — J34.89 NASAL VESTIBULITIS: Status: RESOLVED | Noted: 2018-03-06 | Resolved: 2019-01-01

## 2019-03-17 PROBLEM — R53.81 DEBILITY: Status: RESOLVED | Noted: 2018-01-01 | Resolved: 2019-01-01

## 2019-03-17 PROBLEM — J18.9 COMMUNITY ACQUIRED PNEUMONIA: Status: RESOLVED | Noted: 2018-01-19 | Resolved: 2019-01-01

## 2019-03-17 PROBLEM — R19.7 NAUSEA VOMITING AND DIARRHEA: Status: RESOLVED | Noted: 2018-01-01 | Resolved: 2019-01-01

## 2019-03-17 PROBLEM — R30.0 DYSURIA: Status: RESOLVED | Noted: 2018-01-19 | Resolved: 2019-01-01

## 2019-03-17 PROBLEM — R11.2 NAUSEA VOMITING AND DIARRHEA: Status: RESOLVED | Noted: 2018-01-01 | Resolved: 2019-01-01

## 2019-03-17 PROBLEM — N30.00 ACUTE CYSTITIS WITHOUT HEMATURIA: Status: RESOLVED | Noted: 2018-01-01 | Resolved: 2019-01-01

## 2019-03-17 NOTE — PROGRESS NOTES
Fiorella Connelly presents with moderate LBP radiating both legs to knees. Also  PMR pain decr rom rt shoulder   HBP has been stable.  Has chronic anxiety   Hx esophageal strictures 2nd to GERD--GERD sig worse p 2 w. Currently on omeprazole.  Past Medical History:   Diagnosis Date    Acute pancreatitis     told chronic pancreatitis prior to diagnosis of gallstone and cholecystectomy    Fibromyalgia     GERD (gastroesophageal reflux disease)     Hypertension     Polymyalgia rheumatica     Rheumatoid arthritis     rheumatoid     Past Surgical History:   Procedure Laterality Date    APPENDECTOMY      CARPAL TUNNEL RELEASE Bilateral     CHOLECYSTECTOMY      COLONOSCOPY  ~2010    normal findings per patient report    COLONOSCOPY N/A 4/30/2018    Performed by William [facility] Jr[facility]    ESOPHAGOGASTRODUODENOSCOPY (EGD) N/A 4/30/2018    Performed by William Domingo Jr[facility] MD at [facility]    EYE SURGERY Bilateral     cataracts    FOOT SURGERY Bilateral     HIP FRACTURE SURGERY  09/2017    HYSTERECTOMY      ovaries remain    UPPER GASTROINTESTINAL ENDOSCOPY  ~2010    with dilation     Review of patient's allergies indicates:   Allergen Reactions    Xanax [alprazolam] Swelling    Cetirizine Other (See Comments)     hallucinations    Nsaids (non-steroidal anti-inflammatory drug)     Shellfish containing products Swelling    Sulfa (sulfonamide antibiotics) Nausea And Vomiting    Chlorhexidine Rash     Current Outpatient Medications on File Prior to Visit   Medication Sig Dispense Refill    acetaminophen (TYLENOL) 500 MG tablet Take 500 mg by mouth every 6 (six) hours as needed for Pain.      amLODIPine (NORVASC) 5 MG tablet TAKE 1 TABLET (5 MG TOTAL) BY MOUTH ONCE DAILY. 30 tablet 12    aspirin 81 MG Chew Take 81 mg by mouth once daily.      CALCIUM CARBONATE/VITAMIN D3 (CALCIUM 600 + D,3, ORAL) Take by mouth.      cloNIDine (CATAPRES) 0.2 MG tablet TAKE 1 TABLET BY MOUTH EVERY DAY 90  tablet 4    folic acid (FOLVITE) 1 MG tablet Take 1 tablet (1,000 mcg total) by mouth once daily. 30 tablet 12    gabapentin (NEURONTIN) 300 MG capsule TAKE ONE CAPSULE BY MOUTH EVERY MORNING, THEN 2 CAPSULE EVERY EVENING 270 capsule 4    irbesartan-hydrochlorothiazide (AVALIDE) 300-12.5 mg per tablet TAKE 1 TABLET BY MOUTH ONCE DAILY. 90 tablet 3    L.acidophil,parac-S.therm-Bif. (RISAQUAD) Cap capsule Take 1 capsule by mouth once daily.      loratadine (CLARITIN) 10 mg tablet TAKE 1 TABLET BY MOUTH EVERY DAY 90 tablet 3    LORazepam (ATIVAN) 1 MG tablet Take 1 tablet (1 mg total) by mouth 2 (two) times daily. 15 tablet 0    megestrol (MEGACE) 40 MG Tab Take 1 tablet (40 mg total) by mouth once daily. 30 tablet 11    methotrexate 2.5 MG Tab TAKE 8 TABLETS BY MOUTH EVERY 7 DAYS. 120 tablet 3    metoprolol succinate (TOPROL-XL) 50 MG 24 hr tablet TAKE 1 TABLET BY MOUTH DAILY. 30 tablet 12    metroNIDAZOLE (FLAGYL) 250 MG tablet Take 1 tablet (250 mg total) by mouth 2 (two) times daily. 20 tablet 0    montelukast (SINGULAIR) 10 mg tablet Take 10 mg by mouth.      MULTIVIT-IRON-MIN-FOLIC ACID 3,500-18-0.4 UNIT-MG-MG ORAL CHEW Take by mouth.      omeprazole (PRILOSEC) 20 MG capsule TAKE 1 CAPSULE BY MOUTH TWICE DAILY 180 capsule 3    oxybutynin (DITROPAN-XL) 5 MG TR24 Take 1 tablet (5 mg total) by mouth once daily. 30 tablet 11    predniSONE (DELTASONE) 10 MG tablet Take 15 mg by mouth once daily.      traMADol (ULTRAM) 50 mg tablet Take one tb prn q 12 hrs for pain 15 tablet 0    traMADol (ULTRAM) 50 mg tablet TAKE 2 TABLETS BY MOUTH 4 TIMES DAILY 240 tablet 2     No current facility-administered medications on file prior to visit.      Social History     Socioeconomic History    Marital status:      Spouse name: Not on file    Number of children: Not on file    Years of education: Not on file    Highest education level: Not on file   Social Needs    Financial resource strain: Not on file     Food insecurity - worry: Not on file    Food insecurity - inability: Not on file    Transportation needs - medical: Not on file    Transportation needs - non-medical: Not on file   Occupational History    Not on file   Tobacco Use    Smoking status: Never Smoker    Smokeless tobacco: Never Used   Substance and Sexual Activity    Alcohol use: No    Drug use: No    Sexual activity: Not on file   Other Topics Concern    Not on file   Social History Narrative    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  EYES: Visual acuity fine. No photophobia, ocular pain or diplopia.EARS: Denies ear pain, discharge or vertigo.NOSE: No loss of smell, no epistaxis some postnasal drip.MOUTH & THROAT: No hoarseness or change in voice. No excessive gum bleeding.CHEST: Denies DURAN, cyanosis, wheezing  CARDIOVASCULAR: Denies chest pain, PND, orthopnea or reduced exercise tolerance.  ABDOMEN:  No weight loss.No abdominal pain, no hematemesis or blood in stool.  URINARY: No flank pain, dysuria or hematuria.  PERIPHERAL VASCULAR: No claudication or cyanosis.  MUSCULOSKELETAL: Negative   NEUROLOGIC: No history of seizures, paralysis, alteration of gait or coordination.    PE: Vital signs as noted  Heent:Normocephalic with no recent cranial trauma,PERRLA,EOMI,conjunctiva clear,fundi reveal no hemmorhage exudate or papilledema.Otic canals clear, tympanic membranes slightly dull bilaterally.Nasal mucosa slightly red and edematous.Posterior pharynx slightly red but without exudate.  Neck:Supple with minimal anterior cervical adenopathy.  Chest:Clear bilateral  breath sounds with mild scattered ronchi  Heart:Regular rhthym without murmer  Abdomen:Soft, non tender,no masses, no hepatosplenomegalyExtremeties and Neurologic:No acute findings mod severe degen changes     Impression:Recent c dif   Urinary incontinence  Sacroiliitis  LBP  GERD c stricture   Radiculopathy  RA  Rt knee arthralgia   PMR  HBP  Anxiety        Plan:Lab eval rev   Rec diet and ex recs  Rev sig side effects current meds  If BP cont at this level hold clonidine  OK to use daily probiotic  Dental con re apthoud ulcers

## 2019-03-21 NOTE — PROGRESS NOTES
Subjective:       Patient ID: Fiorella Connelly is a 89 y.o. female.    Chief Complaint: Cough    Cough   This is a new problem. The current episode started in the past 7 days. The problem has been rapidly worsening. The problem occurs every few minutes. The cough is non-productive. Pertinent negatives include no chest pain, ear pain, fever, headaches, myalgias, rash, sore throat or shortness of breath. Associated symptoms comments: congestion. She has tried prescription cough suppressant (Singulair,anti histamine) for the symptoms. The treatment provided no relief.       Review of Systems   Constitutional: Negative for fatigue, fever and unexpected weight change.   HENT: Positive for congestion. Negative for ear pain and sore throat.    Eyes: Negative for pain and visual disturbance.   Respiratory: Positive for cough. Negative for shortness of breath.    Cardiovascular: Negative for chest pain and palpitations.   Gastrointestinal: Negative for abdominal pain, diarrhea and vomiting.   Musculoskeletal: Negative for arthralgias and myalgias.   Skin: Negative for color change and rash.   Neurological: Negative for dizziness and headaches.   Psychiatric/Behavioral: Negative for dysphoric mood and sleep disturbance. The patient is not nervous/anxious.        Vitals:    03/21/19 1543   BP: 130/71   Pulse: 81   Temp: 98.2 °F (36.8 °C)       Objective:     Current Outpatient Medications   Medication Sig Dispense Refill    acetaminophen (TYLENOL) 500 MG tablet Take 500 mg by mouth every 6 (six) hours as needed for Pain.      amLODIPine (NORVASC) 5 MG tablet TAKE 1 TABLET (5 MG TOTAL) BY MOUTH ONCE DAILY. 30 tablet 12    aspirin 81 MG Chew Take 81 mg by mouth once daily.      CALCIUM CARBONATE/VITAMIN D3 (CALCIUM 600 + D,3, ORAL) Take by mouth.      cloNIDine (CATAPRES) 0.2 MG tablet TAKE 1 TABLET BY MOUTH EVERY DAY 90 tablet 4    folic acid (FOLVITE) 1 MG tablet Take 1 tablet (1,000 mcg total) by mouth once daily. 30  tablet 12    gabapentin (NEURONTIN) 300 MG capsule TAKE ONE CAPSULE BY MOUTH EVERY MORNING, THEN 2 CAPSULE EVERY EVENING 270 capsule 4    irbesartan-hydrochlorothiazide (AVALIDE) 300-12.5 mg per tablet TAKE 1 TABLET BY MOUTH ONCE DAILY. 90 tablet 3    L.acidophil,parac-S.therm-Bif. (RISAQUAD) Cap capsule Take 1 capsule by mouth once daily.      loratadine (CLARITIN) 10 mg tablet TAKE 1 TABLET BY MOUTH EVERY DAY 90 tablet 3    LORazepam (ATIVAN) 1 MG tablet Take 1 tablet (1 mg total) by mouth 2 (two) times daily. 15 tablet 0    megestrol (MEGACE) 40 MG Tab Take 1 tablet (40 mg total) by mouth once daily. 30 tablet 11    methotrexate 2.5 MG Tab TAKE 8 TABLETS BY MOUTH EVERY 7 DAYS. 120 tablet 3    metoprolol succinate (TOPROL-XL) 50 MG 24 hr tablet TAKE 1 TABLET BY MOUTH DAILY. 30 tablet 12    montelukast (SINGULAIR) 10 mg tablet Take 10 mg by mouth.      MULTIVIT-IRON-MIN-FOLIC ACID 3,500-18-0.4 UNIT-MG-MG ORAL CHEW Take by mouth.      nystatin (MYCOSTATIN) 100,000 unit/mL suspension Take 4 mLs (400,000 Units total) by mouth 4 (four) times daily. for 10 days 473 mL 3    omeprazole (PRILOSEC) 20 MG capsule TAKE 1 CAPSULE BY MOUTH TWICE DAILY 180 capsule 3    oxybutynin (DITROPAN-XL) 5 MG TR24 Take 1 tablet (5 mg total) by mouth once daily. 30 tablet 11    predniSONE (DELTASONE) 10 MG tablet Take 15 mg by mouth once daily.      traMADol (ULTRAM) 50 mg tablet TAKE 2 TABLETS BY MOUTH 4 TIMES DAILY 240 tablet 2    doxycycline (MONODOX) 100 MG capsule Take 1 capsule (100 mg total) by mouth 2 (two) times daily. for 10 days 20 capsule 0    guaifenesin-codeine 100-10 mg/5 ml (CHERATUSSIN AC)  mg/5 mL syrup Take 5 mLs by mouth 3 (three) times daily as needed. 118 mL 0     No current facility-administered medications for this visit.        Physical Exam   Constitutional: She is oriented to person, place, and time. She appears well-developed and well-nourished. No distress.   HENT:   Head: Normocephalic  and atraumatic.   Hoarse voice   Eyes: EOM are normal. Pupils are equal, round, and reactive to light.   Neck: Normal range of motion. Neck supple.   Cardiovascular: Normal rate and regular rhythm.   Pulmonary/Chest: Effort normal. She has rhonchi. She has rales.   Loose cough   Musculoskeletal: Normal range of motion.   Neurological: She is alert and oriented to person, place, and time.   Skin: Skin is warm and dry. No rash noted.   Psychiatric: She has a normal mood and affect. Judgment normal.   Nursing note and vitals reviewed.      Assessment:       1. Bronchitis with bronchospasm        Plan:   Bronchitis with bronchospasm    Other orders  -     doxycycline (MONODOX) 100 MG capsule; Take 1 capsule (100 mg total) by mouth 2 (two) times daily. for 10 days  Dispense: 20 capsule; Refill: 0  -     guaifenesin-codeine 100-10 mg/5 ml (CHERATUSSIN AC)  mg/5 mL syrup; Take 5 mLs by mouth 3 (three) times daily as needed.  Dispense: 118 mL; Refill: 0        No Follow-up on file.    Patient Instructions   Over the counter mucinex  Stay well hydrated  Home albuterol treatments twice daily

## 2019-04-02 NOTE — TELEPHONE ENCOUNTER
----- Message from Latasha Trinidad sent at 4/2/2019  1:31 PM CDT -----  Contact: Daughter-Maria Antonia PerryIrtomj-[phone]  Would like return call from nurse to discuss setting up home health. Please call back at [phone].  Md Cheo

## 2019-04-02 NOTE — TELEPHONE ENCOUNTER
Pt had a close fall at home and pt has a wound right arm, along with a few other wounds. Pt's daughter states that it is not healing to well. Pt's daughter is requesting wound care via [facility]. Please advise.

## 2019-04-23 NOTE — TELEPHONE ENCOUNTER
Home Health SOC 04/05/2019 - 06/03/2019 with [facility] ([facility] - Dr. Aris Hoover. Patient received SN, PT and OT services.

## 2019-05-14 NOTE — TELEPHONE ENCOUNTER
----- Message from Tono Arenas sent at 5/14/2019 12:54 PM CDT -----  Contact: Aracely ([facility])  Please give Aracely a call at [phone] regarding some red and purple bumps on her legs

## 2019-05-14 NOTE — TELEPHONE ENCOUNTER
Advised Jessica if the bumps are causing an discomfort or concerns that Mrs. Connelly would need to be seen by someone. States she will discuss with the pt

## 2019-05-15 NOTE — TELEPHONE ENCOUNTER
----- Message from Gladis Morris sent at 5/15/2019  2:13 PM CDT -----  Contact: [facility]-send[facility]  Type:  Needs Medical Advice    Who Called: sendy  Symptoms (please be specific): n/a   How long has patient had these symptoms:n/a  Pharmacy name and phone #:  n/a  Would the patient rather a call back or a response via [facility]? Call back  Best Call Back Number: [phone]  Additional Information: requesting call back regarding giving doctor information regarding to pt new skin tear.    Thanks,  Gladis Morris

## 2019-05-15 NOTE — TELEPHONE ENCOUNTER
**FYI**  Pt has a new skin tear, they are performing same wond care as previous tears and will report any problems.

## 2019-06-12 NOTE — TELEPHONE ENCOUNTER
Pharmacy is requesting alternate medication for irbesartan-hctz 300/12.5mg daily, the med is on backorder and they can't get it, please advise

## 2019-06-12 NOTE — TELEPHONE ENCOUNTER
Spoke to Noelle at [facility], do not have the lower dose of irbesartan either, gave olmesartan RX verbally to Noelle at [facility].

## 2019-06-17 NOTE — TELEPHONE ENCOUNTER
----- Message from Gadiel Zeng sent at 6/17/2019 10:09 AM CDT -----  Contact: daughter- [phone]  ..Type:  RX Refill Request    Who Called: Agustin  Refill or New Rx:refill  RX Name and Strength:Ativan   How is the patient currently taking it? (ex. 1XDay):twice a day  Is this a 30 day or 90 day RX:30  Preferred Pharmacy with phone number:..  [id number] [place] - [address] [address]  [address]  [place] [address]  Phone: [phone] Fax: [phone]      Local or Mail Order:local  Ordering Provider:Kiko  Would the patient rather a call back or a response via [facility]? Call back   Best Call Back Number:[phone]  Additional Information: Tramadol as well does not know the mg but she takes 2 tablets 4 times a day

## 2019-09-24 NOTE — PROGRESS NOTES
Patient of Dr. Hoover presents with chronic diarrhea.  Symptoms better temporarily with Imodium then recur.  She apparently has had this for going on a year.  She did have previous GI evaluation.  She has been treated for C difficile previously.  She does get some dysphagia and has suffered GI dilation previously.  She feels like the stricture may return.  She is being treated by Dr. Hoover for rheumatoid arthritis.  She is on long-term low-dose prednisone and methotrexate.  She is also on aspirin.  She does note easy bruising.  Bruises are primarily on the forearms and shins.  No pathological bleeding otherwise.  Daughter had questions regarding possible hospice.  She did have a previous mild TSH elevation which has not been recheck.  Weight loss noted on vitals    Fiorella was seen today for diarrhea and bleeding/bruising.    Diagnoses and all orders for this visit:    Diarrhea, unspecified type  -     Giardia / Cryptosporidum, EIA; Future  -     Stool culture; Future  -     Stool Exam-Ova,Cysts,Parasites; Future  -     WBC, Stool; Future  -     CLOSTRIDIUM DIFFICILE; Future  -     Ambulatory referral to Gastroenterology  -     Comprehensive metabolic panel; Future  -     TSH; Future    Dysphagia, unspecified type  -     Ambulatory referral to Gastroenterology  -     TSH; Future    Elevated TSH  -     TSH; Future    Rheumatoid arthritis involving multiple sites, unspecified rheumatoid factor presence  -     TSH; Future    Long-term use of immunosuppressant medication  -     TSH; Future    Bruising  -     CBC auto differential; Future  -     Protime-INR; Future  -     TSH; Future    Other orders  -     Influenza - High Dose (65+) (PF) (IM)  -     Pneumococcal Polysaccharide Vaccine (23 Valent) (SQ/IM)      Laboratory as above.  Advised that I would defer decision regarding hospice to PCP as I do not see any obvious diagnosis for this.Schedule follow-up with Dr. Hoover in 2 weeks.  Stop aspirin.              Past Medical  History:  Past Medical History:   Diagnosis Date    Acute pancreatitis     told chronic pancreatitis prior to diagnosis of gallstone and cholecystectomy    Fibromyalgia     GERD (gastroesophageal reflux disease)     Hypertension     Polymyalgia rheumatica     Rheumatoid arthritis     rheumatoid     Past Surgical History:   Procedure Laterality Date    APPENDECTOMY      CARPAL TUNNEL RELEASE Bilateral     CHOLECYSTECTOMY      COLONOSCOPY  ~2010    normal findings per patient report    COLONOSCOPY N/A 4/30/2018    Procedure: COLONOSCOPY;  Surgeon: William Domingo Jr., MD;  Location: [facility];  Service: Endoscopy;  Laterality: N/A;    EYE SURGERY Bilateral     cataracts    FOOT SURGERY Bilateral     HIP FRACTURE SURGERY  09/2017    HYSTERECTOMY      ovaries remain    UPPER GASTROINTESTINAL ENDOSCOPY  ~2010    with dilation     Review of patient's allergies indicates:   Allergen Reactions    Xanax [alprazolam] Swelling    Cetirizine Other (See Comments)     hallucinations    Nsaids (non-steroidal anti-inflammatory drug)     Shellfish containing products Swelling    Sulfa (sulfonamide antibiotics) Nausea And Vomiting    Chlorhexidine Rash     Current Outpatient Medications on File Prior to Visit   Medication Sig Dispense Refill    acetaminophen (TYLENOL) 500 MG tablet Take 500 mg by mouth every 6 (six) hours as needed for Pain.      amLODIPine (NORVASC) 5 MG tablet TAKE 1 TABLET (5 MG TOTAL) BY MOUTH ONCE DAILY. 30 tablet 11    cloNIDine (CATAPRES) 0.2 MG tablet TAKE 1 TABLET BY MOUTH EVERY DAY (Patient taking differently: TAKE 1 TABLET BY MOUTH EVERY DAY QHS) 90 tablet 4    diclofenac sodium (VOLTAREN) 1 % Gel Apply 2 g topically 2 (two) times daily. 1 Tube 0    folic acid (FOLVITE) 1 MG tablet TAKE 1 TABLET BY MOUTH DAILY. 30 tablet 11    gabapentin (NEURONTIN) 300 MG capsule TAKE ONE CAPSULE BY MOUTH EVERY MORNING, THEN 2 CAPSULE EVERY EVENING 270 capsule 4     irbesartan-hydrochlorothiazide (AVALIDE) 300-12.5 mg per tablet TAKE 1 TABLET BY MOUTH ONCE DAILY. 90 tablet 3    L.acidophil,parac-S.therm-Bif. (RISAQUAD) Cap capsule Take 1 capsule by mouth once daily.      loratadine (CLARITIN) 10 mg tablet TAKE 1 TABLET BY MOUTH EVERY DAY 90 tablet 3    LORazepam (ATIVAN) 1 MG tablet TAKE 1 TABLET BY MOUTH TWICE A DAY 60 tablet 4    megestrol (MEGACE) 40 MG Tab TAKE 1 TABLET BY MOUTH EVERY DAY 90 tablet 3    metoprolol succinate (TOPROL-XL) 50 MG 24 hr tablet TAKE 1 TABLET BY MOUTH EVERY DAY 30 tablet 12    montelukast (SINGULAIR) 10 mg tablet TAKE 1 TABLET BY MOUTH EVERY EVENING. 90 tablet 4    omeprazole (PRILOSEC) 20 MG capsule TAKE 1 CAPSULE BY MOUTH TWICE DAILY 180 capsule 3    oxybutynin (DITROPAN-XL) 5 MG TR24 TAKE 1 TABLET BY MOUTH EVERY DAY 30 tablet 11    predniSONE (DELTASONE) 10 MG tablet Take 15 mg by mouth once daily.      traMADol (ULTRAM) 50 mg tablet TAKE 2 TABLETS BY MOUTH 4 TIMES DAILY 240 tablet 4    [DISCONTINUED] aspirin 81 MG Chew Take 81 mg by mouth once daily.      CALCIUM CARBONATE/VITAMIN D3 (CALCIUM 600 + D,3, ORAL) Take by mouth every evening.       methotrexate 2.5 MG Tab TAKE 8 TABLETS BY MOUTH EVERY 7 DAYS. 120 tablet 3    MULTIVIT-IRON-MIN-FOLIC ACID 3,500-18-0.4 UNIT-MG-MG ORAL CHEW Take by mouth every evening.        No current facility-administered medications on file prior to visit.      Social History     Socioeconomic History    Marital status:      Spouse name: Not on file    Number of children: Not on file    Years of education: Not on file    Highest education level: Not on file   Occupational History    Not on file   Social Needs    Financial resource strain: Not on file    Food insecurity:     Worry: Not on file     Inability: Not on file    Transportation needs:     Medical: Not on file     Non-medical: Not on file   Tobacco Use    Smoking status: Never Smoker    Smokeless tobacco: Never Used   Substance  "and Sexual Activity    Alcohol use: No    Drug use: No    Sexual activity: Not on file   Lifestyle    Physical activity:     Days per week: Not on file     Minutes per session: Not on file    Stress: Not on file   Relationships    Social connections:     Talks on phone: Not on file     Gets together: Not on file     Attends Taoism service: Not on file     Active member of club or organization: Not on file     Attends meetings of clubs or organizations: Not on file     Relationship status: Not on file   Other Topics Concern    Not on file   Social History Narrative    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  Positive weight loss   CARDIOVASCULAR: Denies chest pain.  ABDOMEN:  Positive diarrhea.  No bleeding.    URINARY: No flank pain, dysuria or hematuria.    Vitals:    09/24/19 1419   BP: 115/67   Pulse: 77   Temp: 98.6 °F (37 °C)   Weight: 59 kg (130 lb)   Height: 5' 3" (1.6 m)     Wt Readings from Last 3 Encounters:   09/24/19 59 kg (130 lb)   06/30/19 62.4 kg (137 lb 9.1 oz)   03/21/19 65.8 kg (145 lb)       OBJECTIVE:   APPEARANCE: Well nourished, well developed, in no acute distress.    HEAD: Normocephalic.  Atraumatic.  No sinus tenderness.  EYES:   Right eye: Pupil reactive.  Conjunctiva clear.    Left eye: Pupil reactive.  Conjunctiva clear.     MOUTH & THROAT:  No pharyngeal erythema or exudate. No lesions.  NECK: Supple. No bruits.  CHEST: Breath sounds clear bilaterally.  Normal respiratory effort  CARDIOVASCULAR: Normal rate.  Regular rhythm.  No murmurs.  No rub.  No gallops.  ABDOMEN: Bowel sounds normal.  Soft.  No tenderness.  No " organomegaly.  PERIPHERAL VASCULAR: No cyanosis.  No clubbing.  No edema.  NEUROLOGIC:  Using wheelchair.  No focal weakness  MENTAL STATUS: Alert.  Oriented x 3.  She has bruises over the forearms and shins.  No bruises on the trunk.

## 2019-09-26 NOTE — TELEPHONE ENCOUNTER
----- Message from Cyril Koroma MD sent at 9/25/2019 12:40 PM CDT -----  Potassium was low.  Sugar elevated.  Red blood cells enlarged.  Thyroid test okay.  Recommend start potassium chloride 10 mEq twice a day.  Recheck potassium, B12, folate level in 1 week.  Keep the appointment that she has scheduled with Dr. Hoover next month to review.

## 2019-09-30 NOTE — TELEPHONE ENCOUNTER
MD WAQAR Kilpatrick Staff           C difficile positive. Recommend Fidaxomicin 200 mg twice a day 10 days. Please send prescription. Keep appointment scheduled with GI.

## 2019-09-30 NOTE — TELEPHONE ENCOUNTER
Metronidazole is no longer first or second choice medication for c. Diff. Recommend VAncomycin oral, rx sent. I would not recommend metronidazole unless cannot get the other medications

## 2019-09-30 NOTE — TELEPHONE ENCOUNTER
----- Message from Latrell Dalton sent at 9/30/2019  4:43 PM CDT -----  Contact: [facility]   .Type:  Pharmacy Calling to Clarify an RX      Pharmacy Name: [facility]  Prescription Name antibotic 200 mg not covered. Need alternative called in.   What do they need to clarify?:   Best Call Back Number: [phone]   Additional Information:              ..  [id number] [place] - [address] [place] [address]  [address] [place] [address]  [place] [address]  Phone: [phone] Fax: [phone]

## 2019-10-01 NOTE — TELEPHONE ENCOUNTER
Pharmacist Kandy informed, they received tne ERX yesterday but had to order the medication and it will be in tomorrow.

## 2019-10-01 NOTE — TELEPHONE ENCOUNTER
----- Message from Anali Deluca sent at 10/1/2019 11:42 AM CDT -----  Contact: daughter/Jamila  Please call pt daughter @ [phone], states she have more questions about pt test results, states she spoke to nurse yesterday.

## 2019-10-01 NOTE — TELEPHONE ENCOUNTER
If C difficile is not severe then it can generally be treated as an outpatient.  However if she is having any severe symptoms such as severe diarrhea, abdominal pain, fever, nausea, vomiting, decreased oral intake and bleeding then I would recommend that she go to the ER.  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  This may or may not cause symptoms.  I would recommend treating for the C difficile 1st and then looking at the parasite if still problems.

## 2019-10-01 NOTE — TELEPHONE ENCOUNTER
Pt's daughter states she's having rectal bleeding. Not sure if it's a hemorrhoid or if it's from C. diff. Also asking if you think she needs to go to er because last time she had C diff she was hospitalized, please advise.

## 2019-10-07 NOTE — LETTER
October 9, 2019      Cyril Koroma MD  [address]  [place] [address]           [facility]  [address] [place] [address]  Phone: [phone]          Patient: Fiorella Connelly   MR Number: [id number]   YOB: 1928   Date of Visit: 10/7/2019       Dear Dr. Cyril Koroma:    Thank you for referring Fiorella Connelly to me for evaluation. Attached you will find relevant portions of my assessment and plan of care.    If you have questions, please do not hesitate to call me. I look forward to following Fiorella Connelly along with you.    Sincerely,    Treva Gonzalez, [facility]    Enclosure  CC:  No Recipients    If you would like to receive this communication electronically, please contact [email or web address] or [phone] to request more information on [employer] access.    For providers and/or their staff who would like to refer a patient to [facility], please contact us through our one-stop-shop provider referral line, [facility], at [phone].    If you feel you have received this communication in error or would no longer like to receive these types of communications, please e-mail [email or web address]

## 2019-10-07 NOTE — PROGRESS NOTES
Subjective:       Patient ID: Fiorella Connelly is a 89 y.o. female Body mass index is 23.04 kg/m².    Chief Complaint: Abdominal Cramping (c.diff, dysphagia)    This patient is established with Dr. Domingo & myself.  Referred by Dr. Koroma for diarrhea & dysphagia.    GI Problem   The primary symptoms include weight loss (lost ~ 28 lbs over the past year due to decreased appetite), abdominal pain, nausea (occasional), diarrhea (taking vancomycin 125 mg qid x 10 day course currently; taking imodium prn rarely; dificid was prescribed but never took due to cost) and hematochezia. Primary symptoms do not include fever, vomiting, melena, hematemesis or dysuria.   The abdominal pain began more than 2 days ago. The abdominal pain is generalized (described as cramping). The severity of the abdominal pain is 0/10 (currently). The abdominal pain is relieved by nothing (worse with eating & bowel movements).   The diarrhea began more than 1 week ago (started in 12/2018, diagnosed with c. diff and was hospitalized for it; reports never improved; diagnosed with c diff and blastocystis hominis from 9/27/19 stool studies). The diarrhea is watery and semi-solid. The diarrhea occurs 5 to 10 times per day. Risk factors: antibiotics in 1/2019 & 3/2019; denies foreign travel, suspect food intake, or ill contacts.   Frequency: occasional small amount of bright red blood on tissue; denies bleeding in between bowel movement.   The illness is also significant for dysphagia (occasional with food, liquids and pills; EGD with dilation helped in the past). The illness does not include chills, odynophagia or constipation. Significant associated medical issues include GERD (PRILOSEC 20 MG BID on for several years; controlled overall with medication), irritable bowel syndrome, hemorrhoids and diverticulitis. Associated medical issues do not include inflammatory bowel disease.     Review of Systems   Constitutional: Positive for appetite change  (decreased, scared it will cause abdominal pain, megace 40 mg once daily) and weight loss (lost ~ 28 lbs over the past year due to decreased appetite). Negative for chills and fever.   HENT: Positive for trouble swallowing (see hpi).    Respiratory: Negative for choking and shortness of breath.    Cardiovascular: Negative for chest pain.   Gastrointestinal: Positive for abdominal pain, anal bleeding, diarrhea (taking vancomycin 125 mg qid x 10 day course currently; taking imodium prn rarely; dificid was prescribed but never took due to cost), dysphagia (occasional with food, liquids and pills; EGD with dilation helped in the past), hematochezia, nausea (occasional) and rectal pain (painful to sit down at times). Negative for constipation, hematemesis, melena and vomiting.   Genitourinary: Negative for difficulty urinating, dysuria, flank pain, frequency, pelvic pain and urgency.   Musculoskeletal:        Takes ultram QID   Neurological: Negative for weakness.       Past Medical History:   Diagnosis Date    Acute pancreatitis     told chronic pancreatitis prior to diagnosis of gallstone and cholecystectomy    C. difficile colitis 10/8/2019    C. difficile diarrhea 12/2018 & 9/2019    Colon polyp     Diverticulitis     Diverticulosis     Fibromyalgia     GERD (gastroesophageal reflux disease)     Hypertension     Polymyalgia rheumatica     Rheumatoid arthritis     rheumatoid     Past Surgical History:   Procedure Laterality Date    APPENDECTOMY      CARPAL TUNNEL RELEASE Bilateral     CHOLECYSTECTOMY      COLONOSCOPY  ~2010    normal findings per patient report    COLONOSCOPY N/A 4/30/2018    Procedure: COLONOSCOPY;  Surgeon: William Domingo Jr., MD;  Location: [facility];  Service: Endoscopy;  Laterality: N/A; Mild colonic spasm consistent with irritable bowel syndrome, 2 colon polyps removed, diverticulosis, hemorrhoids, biopsy: polyps- tubular adenomas, random biopsies WNL    EYE SURGERY Bilateral      cataracts    FOOT SURGERY Bilateral     HIP FRACTURE SURGERY  09/2017    HYSTERECTOMY      ovaries remain    UPPER GASTROINTESTINAL ENDOSCOPY  ~2010    with dilation    UPPER GASTROINTESTINAL ENDOSCOPY  04/30/2018    Dr. Domingo: Gastric mucosal atrophy. gastric polyp removed; esophageal dilation performed; biopsy: stomach: MILD CHRONIC INFLAMMATION AND REACTIVE CHANGES & hyperplastic polyp; negative for h pylori     Family History   Problem Relation Age of Onset    Skin cancer Sister     Lung cancer Brother         smoker    Colon cancer Paternal Grandmother     Crohn's disease Neg Hx     Ulcerative colitis Neg Hx     Stomach cancer Neg Hx     Esophageal cancer Neg Hx      Wt Readings from Last 20 Encounters:   10/07/19 59 kg (130 lb 1.1 oz)   09/24/19 59 kg (130 lb)   06/30/19 62.4 kg (137 lb 9.1 oz)   03/21/19 65.8 kg (145 lb)   03/18/19 63.5 kg (140 lb)   12/26/18 63.6 kg (140 lb 3.4 oz)   11/27/18 66.9 kg (147 lb 6.4 oz)   11/11/18 74.8 kg (165 lb)   11/04/18 69.4 kg (153 lb)   11/01/18 71.7 kg (158 lb)   10/30/18 72 kg (158 lb 11.7 oz)   10/03/18 72 kg (158 lb 11.7 oz)   09/19/18 75.8 kg (167 lb)   08/20/18 76.2 kg (168 lb)   08/07/18 75.8 kg (167 lb)   05/21/18 76.7 kg (169 lb 3.2 oz)   05/10/18 75.8 kg (167 lb 1.7 oz)   04/26/18 76.2 kg (168 lb)   04/26/18 75.2 kg (165 lb 12.6 oz)   04/09/18 75.7 kg (166 lb 14.2 oz)     Lab Results   Component Value Date    LIPASERES 74 12/25/2018     Lab Results   Component Value Date    AMYLASE 63 11/03/2018     Lab Visit on 09/27/2019   Component Date Value Ref Range Status    Giardia Antigen - EIA 09/27/2019 Negative  Negative Final    Cryptosporidium Antigen 09/27/2019 Negative  Negative Final    Stool Culture 09/27/2019 No Salmonella,Shigella,Vibrio,Campylobacter,Yersinia isolated.   Final    Stool Exam-Ova,Cysts,Parasites 09/27/2019 FINAL 10/01/2019 1040*  Final    Comment: SOURCE: STOOL  PARASITIC EXAMINATION                                   FINAL  BLASTOCYSTIS HOMINIS  Detected, few  Cryptosporidium, Cyclospora, and microsporidia are not   readily detected by this method.  Test Performed by:  [address] [address]  : Wilmer Isidro M.D. Ph.D.; CLIA# [id number]      Stool WBC 09/27/2019 No neutrophils seen  No neutrophils seen Final    C. diff Antigen 09/27/2019 Positive* Negative Final    C difficile Toxins A+B, EIA 09/27/2019 Negative  Negative Final    Testing not recommended for children <24 months old.    Shiga Toxin 1 E.coli 09/27/2019 Negative   Final    Shiga Toxin 2 E.coli 09/27/2019 Negative   Final    C. diff PCR 09/27/2019 Positive* Negative Final   Lab Visit on 09/24/2019   Component Date Value Ref Range Status    WBC 09/24/2019 8.00  3.90 - 12.70 K/uL Final    RBC 09/24/2019 3.59* 4.00 - 5.40 M/uL Final    Hemoglobin 09/24/2019 10.7* 12.0 - 16.0 g/dL Final    Hematocrit 09/24/2019 37.6  37.0 - 48.5 % Final    Mean Corpuscular Volume 09/24/2019 105* 82 - 98 fL Final    Mean Corpuscular Hemoglobin 09/24/2019 29.8  27.0 - 31.0 pg Final    Mean Corpuscular Hemoglobin Conc 09/24/2019 28.5* 32.0 - 36.0 g/dL Final    RDW 09/24/2019 18.0* 11.5 - 14.5 % Final    Platelets 09/24/2019 388* 150 - 350 K/uL Final    MPV 09/24/2019 8.8* 9.2 - 12.9 fL Final    Immature Granulocytes 09/24/2019 4.4* 0.0 - 0.5 % Final    Gran # (ANC) 09/24/2019 6.8  1.8 - 7.7 K/uL Final    Immature Grans (Abs) 09/24/2019 0.35* 0.00 - 0.04 K/uL Final    Comment: Mild elevation in immature granulocytes is non specific and   can be seen in a variety of conditions including stress response,   acute inflammation, trauma and pregnancy. Correlation with other   laboratory and clinical findings is essential.      Lymph # 09/24/2019 0.6* 1.0 - 4.8 K/uL Final    Mono # 09/24/2019 0.3  0.3 - 1.0 K/uL Final    Eos # 09/24/2019 0.0  0.0 - 0.5 K/uL Final    Baso # 09/24/2019 0.02   0.00 - 0.20 K/uL Final    nRBC 09/24/2019 0  0 /100 WBC Final    Gran% 09/24/2019 84.4* 38.0 - 73.0 % Final    Lymph% 09/24/2019 7.3* 18.0 - 48.0 % Final    Mono% 09/24/2019 3.6* 4.0 - 15.0 % Final    Eosinophil% 09/24/2019 0.0  0.0 - 8.0 % Final    Basophil% 09/24/2019 0.3  0.0 - 1.9 % Final    Differential Method 09/24/2019 Automated   Final    Prothrombin Time 09/24/2019 10.7  9.0 - 12.5 sec Final    INR 09/24/2019 1.0  0.8 - 1.2 Final    Comment: Coumadin Therapy:  2.0 - 3.0 for INR for all indicators except mechanical heart valves  and antiphospholipid syndromes which should use 2.5 - 3.5.      Sodium 09/24/2019 142  136 - 145 mmol/L Final    Potassium 09/24/2019 3.1* 3.5 - 5.1 mmol/L Final    Chloride 09/24/2019 103  95 - 110 mmol/L Final    CO2 09/24/2019 22* 23 - 29 mmol/L Final    Glucose 09/24/2019 183* 70 - 110 mg/dL Final    BUN, Bld 09/24/2019 19  10 - 30 mg/dL Final    Creatinine 09/24/2019 0.9  0.5 - 1.4 mg/dL Final    Calcium 09/24/2019 8.9  8.7 - 10.5 mg/dL Final    Total Protein 09/24/2019 6.1  6.0 - 8.4 g/dL Final    Albumin 09/24/2019 3.0* 3.5 - 5.2 g/dL Final    Total Bilirubin 09/24/2019 0.5  0.1 - 1.0 mg/dL Final    Comment: For infants and newborns, interpretation of results should be based  on gestational age, weight and in agreement with clinical  observations.  Premature Infant recommended reference ranges:  Up to 24 hours.............<8.0 mg/dL  Up to 48 hours............<12.0 mg/dL  3-5 days..................<15.0 mg/dL  6-29 days.................<15.0 mg/dL      Alkaline Phosphatase 09/24/2019 72  55 - 135 U/L Final    AST 09/24/2019 19  10 - 40 U/L Final    ALT 09/24/2019 11  10 - 44 U/L Final    Anion Gap 09/24/2019 17* 8 - 16 mmol/L Final    eGFR if African American 09/24/2019 >60.0  >60 mL/min/1.73 m^2 Final    eGFR if non  09/24/2019 56.1* >60 mL/min/1.73 m^2 Final    Comment: Calculation used to obtain the estimated glomerular  filtration  rate (eGFR) is the CKD-EPI equation.       TSH 09/24/2019 1.168  0.400 - 4.000 uIU/mL Final     Reviewed prior medical records including radiology report of 12/25/18 ct abdomen pelvis; & endoscopy history (see surgical history).    Objective:      Physical Exam   Constitutional: She is oriented to person, place, and time. She appears well-developed and well-nourished. No distress.   HENT:   Mouth/Throat: Oropharynx is clear and moist and mucous membranes are normal. No oral lesions. No oropharyngeal exudate.   Eyes: Pupils are equal, round, and reactive to light. Conjunctivae are normal. No scleral icterus.   Pulmonary/Chest: Effort normal and breath sounds normal. No respiratory distress. She has no wheezes.   Abdominal: Soft. Normal appearance and bowel sounds are normal. She exhibits no distension, no abdominal bruit and no mass. There is generalized tenderness (mild). There is no rigidity, no rebound, no guarding, no tenderness at McBurney's point and negative Chaparro's sign.   Patient declined rectal exam.   Neurological: She is alert and oriented to person, place, and time.   Skin: Skin is warm and dry. No rash noted. She is not diaphoretic. No erythema. No pallor.   Non-jaundiced   Psychiatric: She has a normal mood and affect. Her behavior is normal. Judgment and thought content normal.   Nursing note and vitals reviewed.      Assessment:       1. Diarrhea, unspecified type    2. History of Clostridium difficile infection    3. Blastocystis hominis infection    4. Pharyngoesophageal dysphagia    5. Immunocompromised    6. History of rheumatoid arthritis    7. Generalized abdominal pain    8. Weight loss    9. Rectal pain    10. History of hemorrhoids    11. Hematochezia        Plan:       Diarrhea, unspecified type  -     metroNIDAZOLE (FLAGYL) 250 MG tablet; Take 3 tablets (750 mg total) by mouth 3 (three) times daily. DO NOT drink alcohol while taking & 72 hours after taking flagyl for 5 days   Dispense: 45 tablet; Refill: 0  - DISCONTINUE IMODIUM DUE TO INFECTIOUS DIARRHEA    History of Clostridium difficile infection  - recommend to avoid anti-motility medications (such as lomotil, Pepto or imodium), take florastor probiotic twice daily and CONTINUE a course of antibiotics- VANCOMYCIN AS DIRECTED  - instructed on good handwashing to prevent spread of bacteria  - Follow-up in 2-4 weeks once completed antibiotic course.     Blastocystis hominis infection  - START    metroNIDAZOLE (FLAGYL) 250 MG tablet; Take 3 tablets (750 mg total) by mouth 3 (three) times daily. DO NOT drink alcohol while taking & 72 hours after taking flagyl for 5 days  Dispense: 45 tablet; Refill: 0    Pharyngoesophageal dysphagia  -     FL Upper GI Without [employer] Esophagram; Future; Expected date: 10/07/2019  -     Fl Modified Barium Swallow Speech; Future; Expected date: 10/07/2019  - schedule EGD, discussed procedure with patient and possible esophageal dilation may be performed during procedure if indicated, patient verbalized understanding  - educated patient to eat smaller more frequent meals and to eat slowly and advised to eat a soft diet.  - possible esophageal manometry if symptoms persist    Immunocompromised & History of rheumatoid arthritis  Recommend follow-up with Primary Care Provider/RHEUMATOLOGY for continued evaluation and management.  - Possible REFERRAL TO INFECTIOUS DISEASE pending results of testing and if symptoms persist    Generalized abdominal pain  -     CT Abdomen Pelvis With Contrast; Future; Expected date: 10/07/2019  - schedule EGD, discussed procedure with patient, including risks and benefits, patient verbalized understanding    Weight loss  -     FL Upper GI Without [employer] Esophagram; Future; Expected date: 10/07/2019  -     Fl Modified Barium Swallow Speech; Future; Expected date: 10/07/2019  - CONTINUE MEGACE 40 MG DAILY AS DIRECTED & FOLLOW-UP WITH PRESCRIBER FOR CONTINUED EVALUATION &  MANAGEMENT  - encouraged PO intake and daily calorie counts to ensure adequate nutrition is taken in, recommend at least 2,000 calories a day  - recommend nutritional drinks, such as Boost, Ensure or Glucerna, to supplement nutrition needs    Rectal pain & History of hemorrhoids  - avoid constipation and straining with bowel movements; try using an OTC stool softener as directed and increase fiber in diet (20-30 grams daily)/OTC fiber supplement such as metamucil (take as directed)  - recommend SITZ baths  - patient declined hemorrhoids prescription  - possible referral to general surgery if symptoms persist    Hematochezia  - discussed about different etiologies that can cause rectal bleeding, such as but not limited to diverticulosis, polyps, colon mass, colon inflammation or infection, anal fissure or hemorrhoids.   - reviewed last Colonoscopy which showed hemorrhoids, discussed about possible lower endoscopy if symptoms persist, recommend treating hemorrhoids at this time, patient verbalized understanding & agreed with management plan  - You may resume normal activity as long as you feel well.  - Avoid/minimize aspirin and anti-inflammatory drugs such as ibuprofen (Advil, Motrin) and naproxen (Aleve and Naprosyn).  - Avoid alcohol.    History of GERD  - continue prilosec 20 mg bid as directed, take in the morning before breakfast, discussed about possible long term use of medication (prefer to use lowest effective dose or discontinuing if possible) and discussed the risks & benefits with taking a reflux medication long term, and to take OTC calcium and vitamin d supplements as directed (such as Citracal +D), patient verbalized understanding  -Educated patient on lifestyle modifications to help control/reduce reflux/abdominal pain including: avoid large meals, avoid eating within 2-3 hours of bedtime (avoid late night eating & lying down soon after eating), elevate head of bed if nocturnal symptoms are present,  smoking cessation (if current smoker), & weight loss (if overweight).   -Educated to avoid known foods which trigger reflux symptoms & to minimize/avoid high-fat foods, chocolate, caffeine, citrus, alcohol, & tomato products.  -Advised to avoid/limit use of NSAID's, since they can cause GI upset, bleeding, and/or ulcers. If needed, take with food.  - schedule EGD, discussed procedure with patient, including risks and benefits, patient verbalized understanding    Follow up in about 1 month (around 11/7/2019), or if symptoms worsen or fail to improve.      If no improvement in symptoms or symptoms worsen, call/follow-up at clinic or go to ER.

## 2019-10-07 NOTE — PATIENT INSTRUCTIONS
Discharge Instructions: Eating a Soft Diet  You have been prescribed a soft diet (also called gastrointestinal soft diet or bland diet). This reduces the amount of work your digestive tract has to do. It also reduces the chance that your digestive tract will be irritated by the food you eat. A soft diet is prescribed for people with digestive problems. The diet consists of foods that are tender, mildly seasoned, and easy to digest. While on this diet, you should not eat fried or spicy foods, or raw fruits and vegetables. Also avoid alcoholic beverages.  General guidelines  · Eat in a calm, relaxed atmosphere. How you eat may be as important as what you eat. Dont rush while eating. Chew your food slowly and thoroughly, and swallow slowly.  · Eat small frequent meals throughout the day, but dont eat within 2 hours of bedtime.  · Avoid any foods that cause discomfort.  · Dont use NSAIDs (nonsteroidal anti-inflammatory drugs), such as aspirin, and ibuprofen. Also avoid medicine that contain aspirin. NSAIDs can cause ulcers and delay or prevent ulcer healing.  · Use antacids as needed, but keep in mind that magnesium-containing antacids may cause diarrhea.  Foods to eat  · Cream of wheat and cream of rice  · Cooked white rice  · Mashed potatoes, and boiled potatoes without skin  · Plain pasta and noodles  · Plain white crackers (such as no-salt soda crackers)  · White bread  · Applesauce  · Cooked fruits without skins or seeds  · Mild juices, such as apple and grape  · Bananas  · Cooked or mashed vegetables without stems and seeds  ? Carrots  ? Summer squash (zucchini, yellow squash)  ? Winter squash (acorn, butternut, spaghetti squash)  · Cottage cheese  · Mild hard or soft cheeses  · Custard  · Yogurt without seeds or nuts  · Milk (you may need lactose-free milk)  · Ice cream without seeds or nuts  · Smooth peanut butter  · Eggs  · Fish, turkey, chicken, or other meat that is not tough or stringy  · Tofu  Foods  to avoid  · Nuts and seeds  · Snack foods, such as the following:  ? Chocolate-containing snacks, candy, pastries, or cakes.  ? Potato chips (plain, barbecued, or other flavors)  ? Taco chips or nachos  ? Corn chips  ? Popcorn, popcorn cakes, or rice cakes  ? Crackers with nuts, seeds, or spicy seasonings  ? French fries  · Fried or greasy foods  · Whole-grain breads, rolls, and crackers  · Breads and rolls with nuts, seeds, or bran  · Bran and granola cereals  · Berries with seeds, such as strawberries, raspberries, and blackberries  · Acidic fruits, such as oranges, grapefruits, micheal, limes, and pineapples  · Raw vegetables  · Mild or hot peppers  · Sauerkraut and pickled vegetables  · Tomatoes or tomato products, such as tomato paste, tomato sauce, and tomato juice  · Barbecue sauce  · Spicy or flavored cheeses, such as jalapeño and black pepper cheese  · Crunchy peanut butter  · Dried cooked beans, such as davis, kidney, or navy beans  · The following meats:  ? Fried or greasy meats  ? Processed, spicy meats, such as sausage, bello, ham, and lunch meats  ? Ribs and other meats with barbecue sauce  ? Tough or stringy meats, such as corned beef or beef jerky  Fluids to avoid  · Alcoholic beverages  · Coffee and regular teas  · Sandra and other drinks with caffeine  · Cranberry, orange, pineapple, and grapefruit juice  · Lemonade  · Vegetable juice  · Whole milk, if you are lactose intolerant  Follow-up  Make a follow-up appointment with a dietitian as directed by our staff.  Date Last Reviewed: 6/21/2015  © 6710-2428 [employer]. [address] [place] [address], [place] [address]. All rights reserved. This information is not intended as a substitute for professional medical care. Always follow your healthcare professional's instructions.            Clostridium Difficile Infection  Clostridium difficile (C. diff) bacteria can be very harmful. They affect the intestinal tract. They can cause symptoms ranging  from mild diarrhea to severe inflammation of the large intestine (colon). C. diff infection is most common during the days and weeks after treatment with antibiotics. Anyone can become infected. But the risk is greatly increased for people in hospitals and for people living in nursing homes or long-term care facilities. This is because antibiotic use is common there. Germs also spread easily in these places.    What causes C. diff infection?  The stomach and intestines have hundreds of kinds of bacteria. Many of these bacteria actually help keep harmful bacteria like C. diff from causing problems. Small amounts of C. diff are normal in the intestine and dont cause problems. When you take an antibiotic, the normal balance of good and bad bacteria may be affected. There may be too few good bacteria and too many harmful bacteria like C diff. In hospitals and nursing homes, C. diff may be spread from an infected person to others. This can happen when staff or visitors touch infected people or objects such as bed rails, stethoscopes, or bedpans and then touch other people or surfaces.  What are the symptoms of C. diff infection?  About half of people with C. diff infection have no symptoms. Yet they can still pass the infection to others. Others do have symptoms. These include:  · Watery diarrhea, which may contain mucus  · Pain and cramping  · Fever  Some who are infected develop serious problems. Symptoms include:  · Belly (abdominal) pain  · Abdominal swelling  · Nausea and vomiting  · Little or no diarrhea  How is C. diff infection diagnosed?  To confirm the infection, a sample of stool is tested for the bacteria or the toxins made by the bacteria.  How is C. diff infection treated?  Your healthcare provider will tell you to stop taking any antibiotics you have been prescribed, based on your healthcare needs. He or she may prescribe different medicines as needed. In certain cases, you may be given an antibiotic  directed at the C. diff infection. Talk with your healthcare provider before stopping or starting any medicines.  · Fluids are often given by IV (intravenously) through a vein. This helps replace fluids lost through diarrhea.  · In rare cases you may need surgery if treatment doesnt cure severe symptoms  To lessen symptoms:  · Drink plenty of fluids to replace water lost through diarrhea. Talk with your healthcare provider or nurse about which fluids are best.  · Follow your healthcare providers instructions for when and what to eat.  · Unless your healthcare provider tells you to do so, don't take medicines for diarrhea.  · Tell your healthcare provider if symptoms return. Even after treatment, C. diff may come back.  Your doctor may give you an additional medicine if your symptoms come back or you are at risk for another C. diff infection. This medicine is called bezlotoxumab. It is not an antibiotic, but it can help keep your C. diff symptoms from returning.  What are the complications of C. diff infection?  Complications include:  · Dehydration  · Electrolyte imbalances  · Low protein in the blood  · Severe widening (dilation) of the large intestine  · A hole (perforation) in the bowel  · Low blood pressure  · Kidney failure  · Inflammation or infection all over the body  · Death  How is C. diff prevented?  Hospitals and nursing homes take these steps to help prevent C. diff infections:  · Limiting use of antibiotics. Giving antibiotics only when needed can help reduce C. diff infections.  · Handwashing. Hospital staff should wash their hands before and after treating each person. They should also wash their hands after touching any surface in someone's' room. Soap and water work better than alcohol-based hand .  · Protective clothing. Healthcare workers should wear gloves and a gown when entering the room of someone with C. diff infection. They should remove these items before leaving and then wash  their hands.  · Private rooms. People with C. diff should be in private rooms. Or they may share rooms with others who have the same infection.  · Thorough cleaning. Equipment and rooms should be cleaned and disinfected every day.  · Education. Everyone should be shown the best ways to avoid infection.  You can do the following to help prevent C. diff:  · Take antibiotics only when you really need them. Antibiotics dont help treat illnesses caused by viruses. This includes colds and the flu. Dont ask for antibiotics from your healthcare provider if he or she says they wont work.  · When you are given antibiotics, take them as directed. Dont take more or less than the dosage prescribed. Do not take them for shorter or longer than your provider tells you to, even if you feel better.  · Wash your hands carefully. Do this after using the bathroom and before eating. Use plenty of soap and warm water. Alcohol-based hand  may not work against C. diff germs.  Everyone can help prevent C. diff:  In a hospital or care facility:  · Wash your hands well before and after visiting someone who has C. diff infection. Use soap and water. Alcohol-based hand  may not work against C. diff.  · If the staff asks you to, wear gloves. Take any other steps you are asked to follow to help prevent infection.  At home:  · If instructed, wear gloves when caring for a family member with C. diff infection. Throw the gloves away after each use. Then wash your hands well.  · Wash the persons clothes, bed linen, and towels separately. Use hot water. Use both detergent and liquid bleach.  · Disinfect surfaces in the persons room. This includes the phone, light switches, and remote controls.  Practice good handwashing:  · Use warm water and plenty of soap. Rub your hands together well.  · Clean your whole hand. Wash under nails, between fingers, and up your wrists.  · Wash for at least 15 seconds to 20 seconds.   · Rinse. Let the  water run down your fingers, not up your wrists.  · Dry your hands well. Then use a paper towel to turn off the faucet and open the door.  Date Last Reviewed: 1/1/2017  © 7596-3701 [employer]. [address] [place] [address], [place] [address]. All rights reserved. This information is not intended as a substitute for professional medical care. Always follow your healthcare professional's instructions.

## 2019-10-08 PROBLEM — K56.609 SMALL BOWEL OBSTRUCTION: Status: RESOLVED | Noted: 2018-01-01 | Resolved: 2019-01-01

## 2019-10-08 PROBLEM — A04.72 C. DIFFICILE COLITIS: Status: ACTIVE | Noted: 2019-01-01

## 2019-10-08 NOTE — PROGRESS NOTES
Fiorella Connelly presents to fu persistent diarrhea, dx c dif, recurrent dysphagia and GI planning endoscopy soon. Hx RA moderate LBP radiating both legs to knees. Also hx pain  rt shoulder   HBP has been stable.  Has chronic anxiety   Past Medical History:   Diagnosis Date    Acute pancreatitis     told chronic pancreatitis prior to diagnosis of gallstone and cholecystectomy    C. difficile diarrhea 12/2018 & 9/2019    Colon polyp     Diverticulitis     Diverticulosis     Fibromyalgia     GERD (gastroesophageal reflux disease)     Hypertension     Polymyalgia rheumatica     Rheumatoid arthritis     rheumatoid     Past Surgical History:   Procedure Laterality Date    APPENDECTOMY      CARPAL TUNNEL RELEASE Bilateral     CHOLECYSTECTOMY      COLONOSCOPY  ~2010    normal findings per patient report    COLONOSCOPY N/A 4/30/2018    Procedure: COLONOSCOPY;  Surgeon: William Domingo Jr., MD;  Location: [facility];  Service: Endoscopy;  Laterality: N/A; Mild colonic spasm consistent with irritable bowel syndrome, 2 colon polyps removed, diverticulosis, hemorrhoids, biopsy: polyps- tubular adenomas, random biopsies WNL    EYE SURGERY Bilateral     cataracts    FOOT SURGERY Bilateral     HIP FRACTURE SURGERY  09/2017    HYSTERECTOMY      ovaries remain    UPPER GASTROINTESTINAL ENDOSCOPY  ~2010    with dilation    UPPER GASTROINTESTINAL ENDOSCOPY  04/30/2018    Dr. Domingo: Gastric mucosal atrophy. gastric polyp removed; esophageal dilation performed; biopsy: stomach: MILD CHRONIC INFLAMMATION AND REACTIVE CHANGES & hyperplastic polyp; negative for h pylori     Review of patient's allergies indicates:   Allergen Reactions    Xanax [alprazolam] Swelling    Cetirizine Other (See Comments)     hallucinations    Nsaids (non-steroidal anti-inflammatory drug)     Shellfish containing products Swelling    Sulfa (sulfonamide antibiotics) Nausea And Vomiting    Chlorhexidine Rash     Current Outpatient  Medications on File Prior to Visit   Medication Sig Dispense Refill    acetaminophen (TYLENOL) 500 MG tablet Take 500 mg by mouth every 6 (six) hours as needed for Pain.      amLODIPine (NORVASC) 5 MG tablet TAKE 1 TABLET (5 MG TOTAL) BY MOUTH ONCE DAILY. 30 tablet 11    cloNIDine (CATAPRES) 0.2 MG tablet TAKE 1 TABLET BY MOUTH EVERY DAY (Patient taking differently: TAKE 1 TABLET BY MOUTH EVERY DAY QHS) 90 tablet 4    diclofenac sodium (VOLTAREN) 1 % Gel Apply 2 g topically 2 (two) times daily. 1 Tube 0    folic acid (FOLVITE) 1 MG tablet TAKE 1 TABLET BY MOUTH DAILY. 30 tablet 11    gabapentin (NEURONTIN) 300 MG capsule TAKE ONE CAPSULE BY MOUTH EVERY MORNING, THEN 2 CAPSULE EVERY EVENING 270 capsule 4    irbesartan-hydrochlorothiazide (AVALIDE) 300-12.5 mg per tablet TAKE 1 TABLET BY MOUTH ONCE DAILY. 90 tablet 3    L.acidophil,parac-S.therm-Bif. (RISAQUAD) Cap capsule Take 1 capsule by mouth once daily.      loratadine (CLARITIN) 10 mg tablet TAKE 1 TABLET BY MOUTH EVERY DAY 90 tablet 3    LORazepam (ATIVAN) 1 MG tablet TAKE 1 TABLET BY MOUTH TWICE A DAY 60 tablet 4    megestrol (MEGACE) 40 MG Tab TAKE 1 TABLET BY MOUTH EVERY DAY 90 tablet 3    meloxicam (MOBIC) 7.5 MG tablet Take 7.5 mg by mouth once daily.  3    methotrexate 2.5 MG Tab TAKE 8 TABLETS BY MOUTH EVERY 7 DAYS. 120 tablet 3    metoprolol succinate (TOPROL-XL) 50 MG 24 hr tablet TAKE 1 TABLET BY MOUTH EVERY DAY 30 tablet 12    metroNIDAZOLE (FLAGYL) 250 MG tablet Take 3 tablets (750 mg total) by mouth 3 (three) times daily. DO NOT drink alcohol while taking & 72 hours after taking flagyl for 5 days 45 tablet 0    montelukast (SINGULAIR) 10 mg tablet TAKE 1 TABLET BY MOUTH EVERY EVENING. 90 tablet 4    olmesartan-hydrochlorothiazide (BENICAR HCT) 40-12.5 mg Tab Take 1 tablet by mouth once daily.  3    omeprazole (PRILOSEC) 20 MG capsule TAKE 1 CAPSULE BY MOUTH TWICE DAILY 180 capsule 3    oxybutynin (DITROPAN-XL) 5 MG TR24 TAKE 1  TABLET BY MOUTH EVERY DAY 30 tablet 11    potassium chloride (MICRO-K) 10 MEQ CpSR Take one capsule, by mouth, twice daily 60 capsule 1    predniSONE (DELTASONE) 10 MG tablet Take 15 mg by mouth once daily.      traMADol (ULTRAM) 50 mg tablet TAKE 2 TABLETS BY MOUTH 4 TIMES DAILY 240 tablet 4    vancomycin (VANCOCIN) 125 MG capsule Take 1 capsule (125 mg total) by mouth 4 (four) times daily. for 10 days 40 capsule 0    loperamide (IMODIUM) 2 mg capsule Take 2 mg by mouth 4 (four) times daily as needed for Diarrhea.      MULTIVIT-IRON-MIN-FOLIC ACID 3,500-18-0.4 UNIT-MG-MG ORAL CHEW Take by mouth every evening.        No current facility-administered medications on file prior to visit.      Social History     Socioeconomic History    Marital status:      Spouse name: Not on file    Number of children: Not on file    Years of education: Not on file    Highest education level: Not on file   Occupational History    Not on file   Social Needs    Financial resource strain: Not on file    Food insecurity:     Worry: Not on file     Inability: Not on file    Transportation needs:     Medical: Not on file     Non-medical: Not on file   Tobacco Use    Smoking status: Never Smoker    Smokeless tobacco: Never Used   Substance and Sexual Activity    Alcohol use: No    Drug use: No    Sexual activity: Not on file   Lifestyle    Physical activity:     Days per week: Not on file     Minutes per session: Not on file    Stress: Not on file   Relationships    Social connections:     Talks on phone: Not on file     Gets together: Not on file     Attends Voodoo service: Not on file     Active member of club or organization: Not on file     Attends meetings of clubs or organizations: Not on file     Relationship status: Not on file   Other Topics Concern    Not on file   Social History Narrative    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  EYES: Visual acuity fine. No photophobia, ocular pain or diplopia.EARS: Denies ear pain, discharge or vertigo.NOSE: No loss of smell, no epistaxis some postnasal drip.MOUTH & THROAT: No hoarseness or change in voice. No excessive gum bleeding.CHEST: Denies DURAN, cyanosis, wheezing  CARDIOVASCULAR: Denies chest pain, PND, orthopnea or reduced exercise tolerance.  ABDOMEN:  No weight loss.No abdominal pain, no hematemesis or blood in stool.  URINARY: No flank pain, dysuria or hematuria.  PERIPHERAL VASCULAR: No claudication or cyanosis.  MUSCULOSKELETAL: Negative   NEUROLOGIC: No history of seizures, paralysis, alteration of gait or coordination.    PE: Vital signs as noted  Heent:Normocephalic with no recent cranial trauma,PERRLA,EOMI,conjunctiva clear,fundi reveal no hemmorhage exudate or papilledema.Otic canals clear, tympanic membranes slightly dull bilaterally.Nasal mucosa slightly red and edematous.Posterior pharynx slightly red but without exudate.  Neck:Supple with minimal anterior cervical adenopathy.  Chest:Clear bilateral breath sounds with mild scattered ronchi  Heart:Regular rhthym without murmer  Abdomen:Soft, non tender,no masses, no hepatosplenomegalyExtremeties and Neurologic:No acute findings mod severe degen changes     Impression:Recent c dif   Urinary incontinence  Sacroiliitis  LBP  GERD c stricture   Radiculopathy  RA  Rt knee arthralgia   PMR  HBP  Anxiety        Plan:Lab eval rev   Rec diet and ex recs  Rev sig side effects current meds  OK to use daily probiotic  GI fu endoscopy  Rec Paliative care consult

## 2019-10-09 NOTE — TELEPHONE ENCOUNTER
"Discussed case with Dr. Augustine & Dr. Wren. They recommended patient see urology urgently and possibly go to the ER pending on patient's current condition.    I called to inform & review the results with the patient and her daughter (daughter reports her mother lives with her): radiology report of the showed no significant findings to the GI organs or the GI tract.  It did show "1. 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  Findings may reflect sequelae of a gas-producing primary bladder infection, iatrogenic causes, or possibly fistula formation.  No obvious fistula is identified, noting that the posterior bladder wall is in close contact with the patient's vaginal stump and sigmoid colon.  No free peritoneal air.  2. Exophytic 1.7 cm hypodense left renal lesion, new in comparison to 12/25/2018, probably representing a cyst.  Stable additional exophytic subcentimeter left renal hypodense lesion, which is too small to characterize and may represent an additional cyst.  Consider further characterization with dedicated renal ultrasound.  3. Sigmoid diverticulosis without evidence of diverticulitis.  4. Marked calcification of the coronary arteries, aortic, and mitral valves.  5. Abdominal aortic atherosclerosis."    Daughter reports the patient has been really weak and tired. Daughter reports the patient does not even want to get up out of the chair to go to the bathroom.  I recommended patient to go to the ER for further evaluation and management. They verbalized understanding. I routed the test results to her urologist, Dr. Garibay. Daughter reports she will take the patient to the ER for further evaluation and management (prior episode of C. Diff in 12/2018 patient was hospitalized for it).          "

## 2019-10-10 PROBLEM — N30.80 EMPHYSEMATOUS CYSTITIS: Status: ACTIVE | Noted: 2019-01-01

## 2019-10-10 PROBLEM — A07.8 BLASTOCYSTIS HOMINIS INFECTION: Status: ACTIVE | Noted: 2019-01-01

## 2019-10-10 NOTE — TELEPHONE ENCOUNTER
Advised would need to get a urine sample on patient to see if she has a gas producing bacteria (most likely) bacterial infection which would require antibiotics, which would make her C-diff worse. Patients daughter said she will take her to the hospital where she can be closely monitored.

## 2019-10-10 NOTE — TELEPHONE ENCOUNTER
----- Message from Kiera Gallagher sent at 10/10/2019  8:58 AM CDT -----  Contact: Jamila Perry (Daughter) [phone]  Jamila Perry (Daughter) [phone]  Please call concerning ct results and possibly same day appt

## 2019-10-11 PROBLEM — K52.9 CHRONIC DIARRHEA: Status: ACTIVE | Noted: 2019-01-01

## 2019-10-14 NOTE — TELEPHONE ENCOUNTER
----- Message from Ann Mata sent at 10/14/2019  4:41 PM CDT -----  Contact: [facility]  They are calling back in regards to pt being admitted to their facility            Pls call back at [phone]

## 2019-10-14 NOTE — TELEPHONE ENCOUNTER
----- Message from Lea Hoang sent at 10/14/2019  1:40 PM CDT -----  Thank You for your referral to [facility]  but unfortunately your patient lives outside of our current servicing areas.

## 2019-10-15 NOTE — TELEPHONE ENCOUNTER
----- Message from Zander Pablo sent at 10/15/2019  9:31 AM CDT -----  Contact: [facility]   Type:  Patient Returning Call    Who Called: [facility]   Who Left Message for Patient: Dottie   Does the patient know what this is regarding?: yes   Would the patient rather a call back or a response via [facility]? Call back   Best Call Back Number:[phone]  Additional Information: n/a

## 2019-10-15 NOTE — TELEPHONE ENCOUNTER
MD Dottie Hancock MA   Caller: Unspecified (Today,  9:55 AM)             I can sign      [employer] notified and HUB information faxed to her

## 2019-10-15 NOTE — TELEPHONE ENCOUNTER
Pt is being admitted to Hospice care and they are asking if you would sign the CTI and plan of care. Also follow pt while under their care or did you want their facility doctor to manage?

## 2019-10-22 NOTE — TELEPHONE ENCOUNTER
----- Message from Lydia Colbert sent at 10/22/2019 12:19 PM CDT -----  Contact: [facility] called to notify that patient has passed way this morning at [facility].